# Patient Record
Sex: MALE | Race: WHITE | NOT HISPANIC OR LATINO | Employment: FULL TIME | URBAN - METROPOLITAN AREA
[De-identification: names, ages, dates, MRNs, and addresses within clinical notes are randomized per-mention and may not be internally consistent; named-entity substitution may affect disease eponyms.]

---

## 2017-10-23 ENCOUNTER — OFFICE VISIT (OUTPATIENT)
Dept: URGENT CARE | Facility: CLINIC | Age: 22
End: 2017-10-23
Payer: COMMERCIAL

## 2017-10-23 DIAGNOSIS — R68.84 JAW PAIN: ICD-10-CM

## 2017-10-23 PROCEDURE — G0382 LEV 3 HOSP TYPE B ED VISIT: HCPCS
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
## 2018-02-14 ENCOUNTER — HOSPITAL ENCOUNTER (EMERGENCY)
Facility: HOSPITAL | Age: 23
Discharge: HOME | End: 2018-02-14
Admitting: EMERGENCY MEDICINE
Payer: COMMERCIAL

## 2018-02-14 VITALS
HEART RATE: 85 BPM | RESPIRATION RATE: 16 BRPM | SYSTOLIC BLOOD PRESSURE: 135 MMHG | TEMPERATURE: 97.9 F | WEIGHT: 155.2 LBS | DIASTOLIC BLOOD PRESSURE: 81 MMHG | OXYGEN SATURATION: 100 %

## 2018-02-14 DIAGNOSIS — S83.91XA RIGHT KNEE SPRAIN: Primary | ICD-10-CM

## 2018-02-14 PROCEDURE — 99283 EMERGENCY DEPT VISIT LOW MDM: CPT

## 2018-02-14 RX ORDER — CLONIDINE HYDROCHLORIDE 0.1 MG/1
0.1 TABLET ORAL 3 TIMES DAILY
COMMUNITY
End: 2018-11-16 | Stop reason: SDUPTHER

## 2018-02-14 RX ORDER — KETOROLAC TROMETHAMINE 10 MG/1
10 TABLET, FILM COATED ORAL EVERY 6 HOURS PRN
Qty: 20 TABLET | Refills: 0 | Status: SHIPPED | OUTPATIENT
Start: 2018-02-14 | End: 2018-03-20 | Stop reason: SDUPTHER

## 2018-02-14 RX ORDER — QUETIAPINE FUMARATE 100 MG/1
TABLET, FILM COATED ORAL
COMMUNITY
End: 2018-03-20 | Stop reason: SDUPTHER

## 2018-02-14 RX ORDER — IBUPROFEN 600 MG/1
TABLET ORAL EVERY 6 HOURS PRN
COMMUNITY
End: 2018-03-20 | Stop reason: SDUPTHER

## 2018-02-14 RX ORDER — BUPRENORPHINE HYDROCHLORIDE AND NALOXONE HYDROCHLORIDE DIHYDRATE 8; 2 MG/1; MG/1
TABLET SUBLINGUAL
COMMUNITY
End: 2018-03-20 | Stop reason: SDUPTHER

## 2018-02-14 NOTE — ED PROVIDER NOTES
History  Chief Complaint   Patient presents with    Knee Swelling     seen at [facility] after twisting right knee  still has pain and swelling since     Patient presents to the emergency department today offering a chief complaint of right knee pain  Last week the patient was stepping over a snow bank when he twisted his right knee  Was seen at [facility], had an x-ray which did not show any acute osseous abnormalities, was given a knee brace and crutches  He is awaiting his orthopedic follow-up  Patient presents here because he he thought he could not obtain an MRI in the emergency department  He denies any increasing pain  Denies any increased swelling  Denies any deficits distal to the injury  Patient is prescribed naproxen at home which he is taking  Patient requesting oral Toradol            Prior to Admission Medications   Prescriptions Last Dose Informant Patient Reported? Taking? QUEtiapine (SEROQUEL) 100 mg tablet   Yes Yes   Sig: Take by mouth   buprenorphine-naloxone (SUBOXONE) 8-2 mg per SL tablet   Yes Yes   Sig: Place under the tongue   cloNIDine (CATAPRES) 0 1 mg tablet   Yes Yes   Sig: Take by mouth   ibuprofen (MOTRIN) 600 mg tablet   Yes Yes   Sig: Take by mouth every 6 (six) hours as needed for mild pain      Facility-Administered Medications: None       Past Medical History:   Diagnosis Date    Manic behavior ([address] )        Past Surgical History:   Procedure Laterality Date    MANDIBLE FRACTURE SURGERY         History reviewed  No pertinent family history  I have reviewed and agree with the history as documented  Social History   Substance Use Topics    Smoking status: Current Every Day Smoker    Smokeless tobacco: Never Used    Alcohol use No        Review of Systems   Constitutional: Negative  HENT: Negative  Eyes: Negative  Respiratory: Negative  Cardiovascular: Negative  Gastrointestinal: Negative  Endocrine: Negative  Genitourinary: Negative  Musculoskeletal: Negative for back pain, neck pain and neck stiffness  Right knee pain   Skin: Negative  Allergic/Immunologic: Negative  Neurological: Negative  Hematological: Negative  Psychiatric/Behavioral: Negative  All other systems reviewed and are negative  Physical Exam  ED Triage Vitals [02/14/18 1446]   Temperature Pulse Respirations Blood Pressure SpO2   97 9 °F (36 6 °C) 102 18 135/81 100 %      Temp Source Heart Rate Source Patient Position - Orthostatic VS BP Location FiO2 (%)   Temporal Left Sitting Left arm --      Pain Score       7           Orthostatic Vital Signs  Vitals:    02/14/18 1446   BP: 135/81   Pulse: 102   Patient Position - Orthostatic VS: Sitting       Physical Exam   Constitutional: He is oriented to person, place, and time  He appears well-developed and well-nourished  No distress  HENT:   Head: Normocephalic and atraumatic  Eyes: Pupils are equal, round, and reactive to light  Cardiovascular: Normal rate  Pulmonary/Chest: Effort normal    Musculoskeletal: He exhibits tenderness  He exhibits no deformity  No evidence of right knee edema  Patient has normal dorsalis pedis pulse distally  Normal range of motion and sensation distally  Neurological: He is alert and oriented to person, place, and time  Skin: Skin is warm  Capillary refill takes less than 2 seconds  He is not diaphoretic  Psychiatric: He has a normal mood and affect  Vitals reviewed        ED Medications  Medications - No data to display    Diagnostic Studies  Results Reviewed     None                 No orders to display              Procedures  Procedures       Phone Contacts  ED Phone Contact    ED Course  ED Course                                MDM  CritCare Time    Disposition  Final diagnoses:   Right knee sprain     Time reflects when diagnosis was documented in both MDM as applicable and the Disposition within this note     Time User Action Codes Description Comment    2/14/2018  3:01 PM Rama Herron Tan Darling Right knee sprain       ED Disposition     ED Disposition Condition Comment    Discharge  Mallie Flatness discharge to home/self care  Condition at discharge: Good        Follow-up Information     Follow up With Specialties Details Why Contact Info    Orthopedics  Go to  Keep appt  Patient's Medications   Discharge Prescriptions    KETOROLAC (TORADOL) 10 MG TABLET    Take 1 tablet (10 mg total) by mouth every 6 (six) hours as needed for moderate pain       Start Date: 2/14/2018 End Date: --       Order Dose: 10 mg       Quantity: 20 tablet    Refills: 0     No discharge procedures on file      ED Provider  Electronically Signed by           Deepali Syed PA-C  02/14/18 0383

## 2018-02-14 NOTE — DISCHARGE INSTRUCTIONS
Knee Sprain   WHAT YOU NEED TO KNOW:   A knee sprain occurs when one or more ligaments in your knee are suddenly stretched or torn  Ligaments are tissues that hold bones together  Ligaments support the knee and keep the joint and bones in the correct position  DISCHARGE INSTRUCTIONS:   Seek care immediately if:   · Any part of your leg feels cold, numb, or looks pale     Contact your healthcare provider if:   · You have new or increased swelling, bruising, or pain in your knee  · Your symptoms do not improve within 6 weeks, even with treatment  · You have questions or concerns about your condition or care  Medicines:   · NSAIDs , such as ibuprofen, help decrease swelling, pain, and fever  This medicine is available with or without a doctor's order  NSAIDs can cause stomach bleeding or kidney problems in certain people  If you take blood thinner medicine, always ask your healthcare provider if NSAIDs are safe for you  Always read the medicine label and follow directions  · Acetaminophen  decreases pain and fever  It is available without a doctor's order  Ask how much to take and how often to take it  Follow directions  Read the labels of all other medicines you are using to see if they also contain acetaminophen, or ask your doctor or pharmacist  Acetaminophen can cause liver damage if not taken correctly  Do not use more than 4 grams (4,000 milligrams) total of acetaminophen in one day  · Prescription pain medicine  may be given  Ask how to take this medicine safely  · Take your medicine as directed  Contact your healthcare provider if you think your medicine is not helping or if you have side effects  Tell him or her if you are allergic to any medicine  Keep a list of the medicines, vitamins, and herbs you take  Include the amounts, and when and why you take them  Bring the list or the pill bottles to follow-up visits  Carry your medicine list with you in case of an emergency    Self-care: · Rest  your knee and do not exercise  You may be told to keep weight off your knee  This means that you should not walk on your injured leg  Rest helps decrease swelling and allows the injury to heal  You can do gentle range of motion (ROM) exercises as directed  This will prevent stiffness  · Apply ice  on your knee for 15 to 20 minutes every hour or as directed  Use an ice pack, or put crushed ice in a plastic bag  Cover it with a towel  Ice helps prevent tissue damage and decreases swelling and pain  · Apply compression to your knee as directed  You may need to wear an elastic bandage  This helps keep your injured knee from moving too much while it heals  You can loosen or tighten the elastic bandage to make it comfortable  It should be tight enough for you to feel support  It should not be so tight that it causes your toes to feel numb or tingly  If you are wearing an elastic bandage, take it off and rewrap it once a day  · Elevate your knee  above the level of your heart as often as you can  This will help decrease swelling and pain  Prop your leg on pillows or blankets to keep it elevated comfortably  Do not put pillows directly behind your knee  · Use support devices as directed:  Support devices such as a splint or brace may be needed  These devices limit movement and protect your joint while it heals  You may be given crutches to use until you can stand on your injured leg without pain  Use devices as directed  Physical therapy:  A physical therapist teaches you exercises to help improve movement and strength, and to decrease pain  Prevent another knee sprain:  Exercise your legs to keep your muscles strong  Strong leg muscles help protect your knee and prevent strain  The following may also prevent a knee sprain:  · Slowly start your exercise or training program   Slowly increase the time, distance, and intensity of your exercise   Sudden increases in training may cause you to injure your knee again  · Wear protective braces and equipment as directed  Braces may prevent your knee from moving the wrong way and causing another sprain  Protective equipment may support your bones and ligaments to prevent injury  · Warm up and stretch before exercise  Warm up by walking or using an exercise bike before starting your regular exercise  Do gentle stretches after warming up  This helps to loosen your muscles and decrease stress on your knee  Cool down and stretch after you exercise  · Wear shoes that fit correctly and support your feet  Replace your running or exercise shoes before the padding or shock absorption is worn out  Ask your healthcare provider which exercise shoes are best for you  Ask if you should wear special shoe inserts  Shoe inserts can help support your heels and arches or keep your foot lined up correctly in your shoes  Exercise on flat surfaces  Follow up with your healthcare provider as directed:  Write down your questions so you remember to ask them during your visits  © 2017 [address] [employer] Information is for End User's use only and may not be sold, redistributed or otherwise used for commercial purposes  All illustrations and images included in CareNotes® are the copyrighted property of [employer]  or Justin Manjarrez  The above information is an  only  It is not intended as medical advice for individual conditions or treatments  Talk to your doctor, nurse or pharmacist before following any medical regimen to see if it is safe and effective for you  Knee Sprain   WHAT YOU NEED TO KNOW:   A knee sprain occurs when one or more ligaments in your knee are suddenly stretched or torn  Ligaments are tissues that hold bones together  Ligaments support the knee and keep the joint and bones in the correct position          DISCHARGE INSTRUCTIONS:   Return to the emergency department if:   · Any part of your leg feels cold, numb, or looks pale     Contact your healthcare provider if:   · You have new or increased swelling, bruising, or pain in your knee  · Your symptoms do not improve within 6 weeks, even with treatment  · You have questions or concerns about your condition or care  Medicines:   · NSAIDs , such as ibuprofen, help decrease swelling, pain, and fever  This medicine is available with or without a doctor's order  NSAIDs can cause stomach bleeding or kidney problems in certain people  If you take blood thinner medicine, always ask your healthcare provider if NSAIDs are safe for you  Always read the medicine label and follow directions  · Acetaminophen  decreases pain and fever  It is available without a doctor's order  Ask how much to take and how often to take it  Follow directions  Read the labels of all other medicines you are using to see if they also contain acetaminophen, or ask your doctor or pharmacist  Acetaminophen can cause liver damage if not taken correctly  Do not use more than 4 grams (4,000 milligrams) total of acetaminophen in one day  · Prescription pain medicine  may be given  Ask how to take this medicine safely  · Take your medicine as directed  Contact your healthcare provider if you think your medicine is not helping or if you have side effects  Tell him or her if you are allergic to any medicine  Keep a list of the medicines, vitamins, and herbs you take  Include the amounts, and when and why you take them  Bring the list or the pill bottles to follow-up visits  Carry your medicine list with you in case of an emergency  Self-care:   · Rest  your knee and do not exercise  You may be told to keep weight off your knee  This means that you should not walk on your injured leg  Rest helps decrease swelling and allows the injury to heal  You can do gentle range of motion (ROM) exercises as directed  This will prevent stiffness       · Apply ice  on your knee for 15 to 20 minutes every hour or as directed  Use an ice pack, or put crushed ice in a plastic bag  Cover it with a towel  Ice helps prevent tissue damage and decreases swelling and pain  · Apply compression to your knee as directed  You may need to wear an elastic bandage  This helps keep your injured knee from moving too much while it heals  You can loosen or tighten the elastic bandage to make it comfortable  It should be tight enough for you to feel support  It should not be so tight that it causes your toes to feel numb or tingly  If you are wearing an elastic bandage, take it off and rewrap it once a day  · Elevate your knee  above the level of your heart as often as you can  This will help decrease swelling and pain  Prop your leg on pillows or blankets to keep it elevated comfortably  Do not put pillows directly behind your knee  · Use support devices as directed:  Support devices such as a splint or brace may be needed  These devices limit movement and protect your joint while it heals  You may be given crutches to use until you can stand on your injured leg without pain  Use devices as directed  Physical therapy:  A physical therapist teaches you exercises to help improve movement and strength, and to decrease pain  Prevent another knee sprain:  Exercise your legs to keep your muscles strong  Strong leg muscles help protect your knee and prevent strain  The following may also prevent a knee sprain:  · Slowly start your exercise or training program   Slowly increase the time, distance, and intensity of your exercise  Sudden increases in training may cause you to injure your knee again  · Wear protective braces and equipment as directed  Braces may prevent your knee from moving the wrong way and causing another sprain  Protective equipment may support your bones and ligaments to prevent injury  · Warm up and stretch before exercise  Warm up by walking or using an exercise bike before starting your regular exercise   Do gentle stretches after warming up  This helps to loosen your muscles and decrease stress on your knee  Cool down and stretch after you exercise  · Wear shoes that fit correctly and support your feet  Replace your running or exercise shoes before the padding or shock absorption is worn out  Ask your healthcare provider which exercise shoes are best for you  Ask if you should wear special shoe inserts  Shoe inserts can help support your heels and arches or keep your foot lined up correctly in your shoes  Exercise on flat surfaces  Follow up with your healthcare provider as directed:  Write down your questions so you remember to ask them during your visits  © 2017 [employer] [employer] Information is for End User's use only and may not be sold, redistributed or otherwise used for commercial purposes  All illustrations and images included in CareNotes® are the copyrighted property of [employer]  or [address]  The above information is an  only  It is not intended as medical advice for individual conditions or treatments  Talk to your doctor, nurse or pharmacist before following any medical regimen to see if it is safe and effective for you

## 2018-03-20 ENCOUNTER — OFFICE VISIT (OUTPATIENT)
Dept: FAMILY MEDICINE CLINIC | Facility: CLINIC | Age: 23
End: 2018-03-20

## 2018-03-20 VITALS
OXYGEN SATURATION: 96 % | TEMPERATURE: 97 F | HEART RATE: 94 BPM | BODY MASS INDEX: 25.43 KG/M2 | RESPIRATION RATE: 17 BRPM | HEIGHT: 67 IN | SYSTOLIC BLOOD PRESSURE: 144 MMHG | WEIGHT: 162 LBS | DIASTOLIC BLOOD PRESSURE: 76 MMHG

## 2018-03-20 DIAGNOSIS — M25.561 ACUTE PAIN OF RIGHT KNEE: Primary | ICD-10-CM

## 2018-03-20 PROCEDURE — 99213 OFFICE O/P EST LOW 20 MIN: CPT | Performed by: FAMILY MEDICINE

## 2018-03-20 RX ORDER — BUPRENORPHINE HYDROCHLORIDE, NALOXONE HYDROCHLORIDE 2; .5 MG/1; MG/1
FILM, SOLUBLE BUCCAL; SUBLINGUAL
Refills: 0 | COMMUNITY
Start: 2018-03-13 | End: 2018-10-10

## 2018-03-20 RX ORDER — QUETIAPINE FUMARATE 50 MG/1
TABLET, FILM COATED ORAL
Refills: 0 | COMMUNITY
Start: 2018-03-13 | End: 2018-03-20

## 2018-03-20 RX ORDER — OXYCODONE HYDROCHLORIDE AND ACETAMINOPHEN 5; 325 MG/1; MG/1
1 TABLET ORAL EVERY 4 HOURS PRN
Qty: 5 TABLET | Refills: 0 | Status: SHIPPED | OUTPATIENT
Start: 2018-03-20 | End: 2018-03-30

## 2018-03-20 NOTE — PROGRESS NOTES
OFFICE VISIT  Beatriz Godinez 23 y o  male MRN: [id number]      Assessment / Plan:  Diagnoses and all orders for this visit:    Acute pain of right knee  -     MRI knee right  wo contrast; Future  -     Ambulatory referral to Orthopedic Surgery; Future  -     oxyCODONE-acetaminophen (PERCOCET) 5-325 mg per tablet; Take 1 tablet by mouth every 4 (four) hours as needed for moderate pain for up to 10 days Max Daily Amount: 6 tablets          Reason For Visit / Chief Complaint  Chief Complaint   Patient presents with    Leg Pain     He states he slipped on ice and twisted his knee  He states it happened 4 weeks ago  He was told to get an MRI  HPI:  Beatriz Godinez is a 23 y o  male presents to the office today to establish care  Pt was originally seen in [address] [place] [address], was unable to get MRI, no order, no PCP  Patient was seen on March 8th at [facility] for acute right knee pain for injury to same knee  Pt had increased knee swelling on March 8th  Patient was recommended for follow-up MRI  unable to get MRI at [facility], did not accept insurance  Today, he is wearing a immobilizer to right knee, he states feeling a snap when he got out of the car, walking on snow  Remains with pain  He has minimal numbness that comes and goes to mid calf  Pain worse in the am, remains with swelling  Historical Information   Past Medical History:   Diagnosis Date    Manic behavior ([facility] [address] )      Past Surgical History:   Procedure Laterality Date    MANDIBLE FRACTURE SURGERY       Social History   History   Alcohol Use No     History   Drug Use No     History   Smoking Status    Current Every Day Smoker   Smokeless Tobacco    Never Used     No family history on file      Meds/Allergies   Allergies   Allergen Reactions    Topamax [Topiramate]        Meds:    Current Outpatient Prescriptions:     cloNIDine (CATAPRES) 0 1 mg tablet, Take by mouth, Disp: , Rfl:     QUEtiapine (SEROquel) 50 mg tablet, take 1 tablet by mouth twice a day if needed for AGITATION, Disp: , Rfl: 0    SUBOXONE 2-0 5 MG, dissolve 1/2 daily under the tongue, Disp: , Rfl: 0      REVIEW OF SYSTEMS  A comprehensive review of systems was negative except for: Musculoskeletal: positive for  joint pain, joint stiffness and limp      Current Vitals:   Blood Pressure: 144/76 (03/20/18 0752)  Pulse: 94 (03/20/18 0752)  Temperature: (!) 97 °F (36 1 °C) (03/20/18 0752)  Respirations: 17 (03/20/18 0752)  Height: 5' 7" (170 2 cm) (03/20/18 0752)  Weight - Scale: 73 5 kg (162 lb) (03/20/18 0752)  SpO2: 96 % (03/20/18 0752)  [unfilled]    PHYSICAL EXAMS:  General appearance: alert and oriented, in no acute distress  Lungs: clear to auscultation bilaterally  Heart: regular rate and rhythm, S1, S2 normal, no murmur, click, rub or gallop  Extremities: edema right knee tenderness, + swelling  and pain  Neurologic: Grossly normal{YES/NO:20        Follow up at this office in 2 weeks     Counseling / Coordination of Care  Total floor / unit time spent today  minutes  Greater than 50% of total time was spent with the patient and / or family counseling and / or coordination of care

## 2018-04-28 LAB
ALBUMIN SERPL BCP-MCNC: 4.6 G/DL (ref 3.5–5.7)
ALP SERPL-CCNC: 82 IU/L (ref 40–150)
ALT SERPL W P-5'-P-CCNC: 279 IU/L (ref 0–50)
AMPHETAMINES (HISTORICAL): NEGATIVE
ANION GAP SERPL CALCULATED.3IONS-SCNC: 18.4 MM/L
APTT PPP: 26.6 SEC (ref 24.4–37.6)
AST SERPL W P-5'-P-CCNC: 176 U/L (ref 8–27)
BACTERIA UR QL AUTO: ABNORMAL
BARBITURATES (HISTORICAL): NEGATIVE
BASOPHILS # BLD AUTO: 0 X3/UL (ref 0–0.3)
BASOPHILS # BLD AUTO: 0.2 % (ref 0–2)
BENZODIAZEPINES (HISTORICAL): NEGATIVE
BILIRUB SERPL-MCNC: 1.2 MG/DL (ref 0.3–1)
BILIRUB UR QL STRIP: ABNORMAL
BUN SERPL-MCNC: 12 MG/DL (ref 7–25)
CALCIUM SERPL-MCNC: 9.4 MG/DL (ref 8.6–10.5)
CANNABINOIDS (HISTORICAL): NEGATIVE
CHLORIDE SERPL-SCNC: 96 MM/L (ref 98–107)
CLARITY UR: CLEAR
CO2 SERPL-SCNC: 26 MM/L (ref 21–31)
COCAINE (HISTORICAL): NEGATIVE
COLOR UR: ABNORMAL
CREAT SERPL-MCNC: 1.06 MG/DL (ref 0.7–1.3)
DEPRECATED RDW RBC AUTO: 13.9 % (ref 11.5–14.5)
EGFR (HISTORICAL): > 60 GFR
EGFR AFRICAN AMERICAN (HISTORICAL): > 60 GFR
EOSINOPHIL # BLD AUTO: 0 X3/UL (ref 0–0.5)
EOSINOPHIL NFR BLD AUTO: 0.2 % (ref 0–5)
ETHANOL (HISTORICAL): < 10 MG/DL
GLUCOSE (HISTORICAL): 182 MG/DL (ref 65–99)
GLUCOSE UR STRIP-MCNC: NEGATIVE MG/DL
HCT VFR BLD AUTO: 47.4 % (ref 42–52)
HGB BLD-MCNC: 16.5 G/DL (ref 14–18)
HGB UR QL STRIP.AUTO: ABNORMAL
HYALINE CASTS #/AREA URNS LPF: ABNORMAL /LPF
INR PPP: 1.11 (ref 0.9–1.5)
KETONES UR STRIP-MCNC: ABNORMAL MG/DL
LDL CHOLESTEROL DIRECT (HISTORICAL): 88.5 MG/DL
LEUKOCYTE ESTERASE UR QL STRIP: NEGATIVE
LYMPHOCYTES # BLD AUTO: 0.8 X3/UL (ref 1.2–4.2)
LYMPHOCYTES NFR BLD AUTO: 6 % (ref 20.5–51.1)
LYMPHOCYTES NFR BLD AUTO: 6.7 % (ref 20.5–51.1)
MCH RBC QN AUTO: 29.1 PG (ref 26–34)
MCHC RBC AUTO-ENTMCNC: 34.8 G/DL (ref 31–36)
MCV RBC AUTO: 83.5 FL (ref 81–99)
MEDICAL ALCOHOL (HISTORICAL): 0 %
METHADONE (HISTORICAL): NEGATIVE
MONOCYTES # BLD AUTO: 0.6 X3/UL (ref 0–1)
MONOCYTES (HISTORICAL): 5 % (ref 1.7–12)
MONOCYTES NFR BLD AUTO: 5 % (ref 1.7–12)
MUCUS THREADS (HISTORICAL): PRESENT /HPF
NEUTROPHILS # BLD AUTO: 10.8 X3/UL (ref 1.4–6.5)
NEUTROPHILS ABS COUNT (HISTORICAL): 89 % (ref 42.2–75.2)
NEUTS SEG NFR BLD AUTO: 87.9 % (ref 42.2–75.2)
NITRITE UR QL STRIP: NEGATIVE
NON-SQ EPI CELLS URNS QL MICRO: ABNORMAL /HPF
OPIATES (HISTORICAL): POSITIVE
OSMOLALITY, SERUM (HISTORICAL): 278 MOSM (ref 262–291)
OXYCODONE (HISTORICAL): NEGATIVE
PH UR STRIP.AUTO: 5.5 [PH] (ref 4.5–8)
PHENCYCLIDINE URINE (HISTORICAL): NEGATIVE
PLATELET # BLD AUTO: 286 X3/UL (ref 130–400)
PLATELET ESTIMATE (HISTORICAL): NORMAL
PLEASE NOTE (HISTORICAL): NORMAL
PMV BLD AUTO: 8.8 FL (ref 8.6–11.7)
POTASSIUM SERPL-SCNC: 3.4 MM/L (ref 3.5–5.5)
PROPOXYPHENE (HISTORICAL): NEGATIVE
PROT UR STRIP-MCNC: ABNORMAL MG/DL
PROTHROMBIN TIME (HISTORICAL): 12.9 SEC (ref 10.1–12.9)
RBC # BLD AUTO: 5.67 X6/UL (ref 4.3–5.9)
RBC #/AREA URNS AUTO: ABNORMAL /HPF
RBC MORPHOLOGY (HISTORICAL): NORMAL
SODIUM SERPL-SCNC: 137 MM/L (ref 134–143)
SP GR UR STRIP.AUTO: >= 1.03 (ref 1–1.03)
TOTAL PROTEIN (HISTORICAL): 7.4 G/DL (ref 6.4–8.9)
UROBILINOGEN UR QL STRIP.AUTO: 1 EU/DL (ref 0.2–8)
WBC # BLD AUTO: 12.2 X3/UL (ref 4.8–10.8)
WBC #/AREA URNS AUTO: ABNORMAL /HPF

## 2018-04-29 LAB
ALBUMIN SERPL BCP-MCNC: 3.8 G/DL (ref 3.5–5.7)
ALP SERPL-CCNC: 64 IU/L (ref 40–150)
ALT SERPL W P-5'-P-CCNC: 220 IU/L (ref 0–50)
ANION GAP SERPL CALCULATED.3IONS-SCNC: 10 MM/L
AST SERPL W P-5'-P-CCNC: 98 U/L (ref 8–27)
BASOPHILS # BLD AUTO: 0 % (ref 0–2)
BASOPHILS # BLD AUTO: 0 X3/UL (ref 0–0.3)
BILIRUB SERPL-MCNC: 0.7 MG/DL (ref 0.3–1)
BUN SERPL-MCNC: 9 MG/DL (ref 7–25)
CALCIUM SERPL-MCNC: 9 MG/DL (ref 8.6–10.5)
CHLORIDE SERPL-SCNC: 104 MM/L (ref 98–107)
CK SERPL-CCNC: 400 IU/L (ref 30–223)
CK-MB (HISTORICAL): 3.3 NG/ML (ref 0.6–6.3)
CO2 SERPL-SCNC: 28 MM/L (ref 21–31)
CREAT SERPL-MCNC: 0.69 MG/DL (ref 0.7–1.3)
DEPRECATED RDW RBC AUTO: 13.6 % (ref 11.5–14.5)
EGFR (HISTORICAL): > 60 GFR
EGFR AFRICAN AMERICAN (HISTORICAL): > 60 GFR
EOSINOPHIL # BLD AUTO: 0 X3/UL (ref 0–0.5)
EOSINOPHIL NFR BLD AUTO: 0 % (ref 0–5)
GLUCOSE (HISTORICAL): 157 MG/DL (ref 65–99)
HCT VFR BLD AUTO: 39.4 % (ref 42–52)
HGB BLD-MCNC: 13.6 G/DL (ref 14–18)
LYMPHOCYTES # BLD AUTO: 1.2 X3/UL (ref 1.2–4.2)
LYMPHOCYTES NFR BLD AUTO: 9.8 % (ref 20.5–51.1)
MCH RBC QN AUTO: 28.5 PG (ref 26–34)
MCHC RBC AUTO-ENTMCNC: 34.4 G/DL (ref 31–36)
MCV RBC AUTO: 82.8 FL (ref 81–99)
MONOCYTES # BLD AUTO: 0.9 X3/UL (ref 0–1)
MONOCYTES NFR BLD AUTO: 7.5 % (ref 1.7–12)
NEUTROPHILS # BLD AUTO: 10.3 X3/UL (ref 1.4–6.5)
NEUTS SEG NFR BLD AUTO: 82.7 % (ref 42.2–75.2)
OSMOLALITY, SERUM (HISTORICAL): 278 MOSM (ref 262–291)
PLATELET # BLD AUTO: 275 X3/UL (ref 130–400)
PMV BLD AUTO: 9.1 FL (ref 8.6–11.7)
POTASSIUM SERPL-SCNC: 4 MM/L (ref 3.5–5.5)
RBC # BLD AUTO: 4.76 X6/UL (ref 4.3–5.9)
SODIUM SERPL-SCNC: 138 MM/L (ref 134–143)
TOTAL PROTEIN (HISTORICAL): 6.4 G/DL (ref 6.4–8.9)
TROPONIN I SERPL-MCNC: 0.03 NG/ML
WBC # BLD AUTO: 12.5 X3/UL (ref 4.8–10.8)

## 2018-05-01 LAB
HEPATITIS A IGM ANTIBODY (HISTORICAL): NEGATIVE
HEPATITIS B CORE IGM ANTIBODY (HISTORICAL): NEGATIVE
HEPATITIS B SURFACE ANTIGEN (HISTORICAL): NEGATIVE
HEPATITIS C ANTIBODY (HISTORICAL): >11 S/CO (ref 0–0.9)

## 2018-10-10 ENCOUNTER — OFFICE VISIT (OUTPATIENT)
Dept: INTERNAL MEDICINE CLINIC | Facility: CLINIC | Age: 23
End: 2018-10-10
Payer: COMMERCIAL

## 2018-10-10 VITALS
BODY MASS INDEX: 23.23 KG/M2 | SYSTOLIC BLOOD PRESSURE: 120 MMHG | DIASTOLIC BLOOD PRESSURE: 76 MMHG | HEIGHT: 67 IN | TEMPERATURE: 98.9 F | WEIGHT: 148 LBS | HEART RATE: 72 BPM | RESPIRATION RATE: 14 BRPM

## 2018-10-10 DIAGNOSIS — Z79.899 ENCOUNTER FOR MONITORING SUBOXONE MAINTENANCE THERAPY: ICD-10-CM

## 2018-10-10 DIAGNOSIS — Z51.81 ENCOUNTER FOR MONITORING SUBOXONE MAINTENANCE THERAPY: ICD-10-CM

## 2018-10-10 DIAGNOSIS — F19.10 IV DRUG ABUSE (HCC): ICD-10-CM

## 2018-10-10 DIAGNOSIS — F19.20 DRUG DEPENDENCE (HCC): Primary | ICD-10-CM

## 2018-10-10 DIAGNOSIS — F11.10 HEROIN ABUSE (HCC): ICD-10-CM

## 2018-10-10 PROBLEM — F41.1 GAD (GENERALIZED ANXIETY DISORDER): Status: ACTIVE | Noted: 2018-10-10

## 2018-10-10 PROBLEM — Z79.891 ENCOUNTER FOR MONITORING SUBOXONE MAINTENANCE THERAPY: Status: ACTIVE | Noted: 2018-10-10

## 2018-10-10 PROBLEM — G43.009 MIGRAINE WITHOUT AURA AND WITHOUT STATUS MIGRAINOSUS, NOT INTRACTABLE: Status: ACTIVE | Noted: 2018-10-10

## 2018-10-10 PROBLEM — Z72.0 TOBACCO ABUSE: Status: ACTIVE | Noted: 2018-10-10

## 2018-10-10 PROCEDURE — 99204 OFFICE O/P NEW MOD 45 MIN: CPT | Performed by: INTERNAL MEDICINE

## 2018-10-10 RX ORDER — BUPRENORPHINE HYDROCHLORIDE AND NALOXONE HYDROCHLORIDE DIHYDRATE 8; 2 MG/1; MG/1
TABLET SUBLINGUAL
Qty: 11 TABLET | Refills: 0 | Status: SHIPPED | OUTPATIENT
Start: 2018-10-10 | End: 2018-10-19 | Stop reason: SDUPTHER

## 2018-10-10 RX ORDER — ZOLPIDEM TARTRATE 10 MG/1
TABLET ORAL
Refills: 0 | COMMUNITY
Start: 2018-10-09 | End: 2018-11-16 | Stop reason: SDUPTHER

## 2018-10-10 RX ORDER — BUPRENORPHINE HYDROCHLORIDE AND NALOXONE HYDROCHLORIDE DIHYDRATE 8; 2 MG/1; MG/1
TABLET SUBLINGUAL
Qty: 2 TABLET | Refills: 0 | Status: SHIPPED | OUTPATIENT
Start: 2018-10-10 | End: 2018-10-10

## 2018-10-10 NOTE — PATIENT INSTRUCTIONS
Buprenorphine/Naloxone (Into the mouth)   Buprenorphine (bue-pre-NOR-feen), Naloxone (nal-OX-one)  Treats narcotic dependence  Brand Name(s): Bunavail, Suboxone, Zubsolv   There may be other brand names for this medicine  When This Medicine Should Not Be Used: This medicine is not right for everyone  Do not use it if you had an allergic reaction to buprenorphine or naloxone  How to Use This Medicine: Thin Sheet, Tablet  · Take your medicine as directed  Your dose may need to be changed several times to find what works best for you  · You must let the medicine dissolve  Never swallow the film or tablet  Your body may not absorb enough of the medicine if you swallow it  · Your health caregiver should show you how to use the medicine  If you do not understand, ask for help  It is important to use the medicine correctly  · Do not talk while the medicine is in your mouth  · Buccal film: Rinse your mouth with water to moisten it  Place the film against the inside of your cheek  If your doctor told you to use more than 1 film, place the second film inside your other cheek  Do not place more than 2 films inside of 1 cheek at a time  Do not move or touch the film  Do not eat or drink anything until the film is completely dissolved  · Sublingual tablet: Place the tablet under your tongue  If your doctor told you to use more than 1 tablet, place all of the tablets in different places under your tongue at the same time  You can use 2 tablets at a time until you have taken all of the medicine, if that is easier for you  Let the tablets dissolve completely in your mouth  Do not eat or drink anything until the tablets are completely dissolved  · Sublingual film: Drink some water to help moisten your mouth  Place the film under your tongue  If your doctor told you to use more than 1 film, place the second film on the opposite side from the first one  Do not move the film after you place it under your tongue   If you are supposed to use more than 2 films, use them the same way, but do not start until the first 2 films are completely dissolved  · Do not break, crush, chew, or cut the film or tablet  · This medicine should come with a Medication Guide  Ask your pharmacist for a copy if you do not have one  · Missed dose: Take a dose as soon as you remember  If it is almost time for your next dose, wait until then and take a regular dose  Do not take extra medicine to make up for a missed dose  · Store the medicine in a closed container at room temperature, away from heat, moisture, and direct light  Ask your pharmacist about the best way to dispose of medicine you do not use  Drugs and Foods to Avoid:   Ask your doctor or pharmacist before using any other medicine, including over-the-counter medicines, vitamins, and herbal products  · Do not use this medicine if you are using or have used an MAO inhibitor within the past 14 days  · Some medicines can affect how buprenorphine/naloxone works  Tell your doctor if you are using the following:   ¨ Carbamazepine, erythromycin, ketoconazole, mirtazapine, phenobarbital, phenytoin, rifampin, tramadol, or trazodone  ¨ Medicine to treat depression  ¨ Medicine to treat HIV/AIDS (including atazanavir, delavirdine, efavirenz, etravirine, nevirapine, ritonavir)  ¨ Phenothiazine medicine  · Do not drink alcohol while you are using this medicine  · Tell your doctor if you use anything else that makes you sleepy  Some examples are allergy medicine, narcotic pain medicine, and alcohol  Tell your doctor if you are also using butorphanol, nalbuphine, pentazocine, or a muscle relaxer    Warnings While Using This Medicine:   · Tell your doctor if you are pregnant or breastfeeding, or if you have kidney disease, liver disease (including hepatitis), lung or breathing problems, adrenal gland problems, an enlarged prostate, trouble urinating, gallbladder problems, low thyroid levels, stomach problems, or a history of depression, brain tumor, head injury, alcohol or drug abuse  · This medicine may cause the following problems:  ¨ High risk of overdose, which can lead to death  ¨ Respiratory depression (serious breathing problem that can be life-threatening)  ¨ Liver problems  ¨ Serotonin syndrome, when used with certain medicines  · This medicine may make you dizzy or drowsy  Do not drive or do anything that could be dangerous until you know how this medicine affects you  Stand or sit up slowly if you feel lightheaded or dizzy  · Tell any doctor or dentist who treats you that you are using this medicine  · This medicine can be habit-forming  Do not use more than your prescribed dose  Call your doctor if you think your medicine is not working  · Do not stop using this medicine suddenly  Your doctor will need to slowly decrease your dose before you stop it completely  · This medicine could cause infertility  Talk with your doctor before using this medicine if you plan to have children  · Keep all medicine out of the reach of children  Never share your medicine with anyone    Possible Side Effects While Using This Medicine:   Call your doctor right away if you notice any of these side effects:  · Allergic reaction: Itching or hives, swelling in your face or hands, swelling or tingling in your mouth or throat, chest tightness, trouble breathing  · Blue lips, fingernails, or skin  · Dark urine or pale stools, nausea, vomiting, loss of appetite, stomach pain, yellow skin or eyes  · Extreme dizziness or weakness, shallow breathing, sweating, seizures, cold or clammy skin  · Severe confusion, lightheadedness, dizziness, or fainting  · Trouble breathing or slow breathing  If you notice these less serious side effects, talk with your doctor:   · Headache, trouble sleeping  · Constipation or upset stomach  · Shaking, feeling hot or cold, runny nose, watery eyes, diarrhea, vomiting, and muscle aches  If you notice other side effects that you think are caused by this medicine, tell your doctor  Call your doctor for medical advice about side effects  You may report side effects to FDA at 8-182-IDA-5047  © 2017 [address] Information is for End User's use only and may not be sold, redistributed or otherwise used for commercial purposes  The above information is an  only  It is not intended as medical advice for individual conditions or treatments  Talk to your doctor, nurse or pharmacist before following any medical regimen to see if it is safe and effective for you

## 2018-10-10 NOTE — PROGRESS NOTES
Assessment/Plan:    No problem-specific Assessment & Plan notes found for this encounter  Diagnoses and all orders for this visit:    Drug dependence ([facility] [address] )  -     Discontinue: buprenorphine-naloxone (SUBOXONE) 8-2 mg per SL tablet; Return to office with sample to be dispensed  -     Suboxone  -     Toxicology screen, urine  -     buprenorphine-naloxone (SUBOXONE) 8-2 mg per SL tablet; One and half tabs sl q day  Encounter for monitoring Suboxone maintenance therapy  -     Discontinue: buprenorphine-naloxone (SUBOXONE) 8-2 mg per SL tablet; Return to office with sample to be dispensed  -     Suboxone  -     Toxicology screen, urine  -     buprenorphine-naloxone (SUBOXONE) 8-2 mg per SL tablet; One and half tabs sl q day  Heroin abuse ([facility])  -     buprenorphine-naloxone (SUBOXONE) 8-2 mg per SL tablet; One and half tabs sl q day  IV drug abuse ([facility])  -     buprenorphine-naloxone (SUBOXONE) 8-2 mg per SL tablet; One and half tabs sl q day  Other orders  -     zolpidem (AMBIEN) 10 mg tablet; take 1/2 to 1 tablet by mouth at bedtime if needed for insomnia  -     lurasidone (LATUDA) 20 mg tablet; Take 20 mg by mouth daily with breakfast      A/P: Pt RTC with the med and was dosed  After 15 minutes, started to feel better and no side effects  Will d/c to home on 12mg tabs, dose 1/6 and RTC in one week  Continue with counseling and reminded to keep meds safe and out of reach of children  Subjective:      Patient ID: Thierno Rose is a 20 y o  male  WM, former pt of [facility], presents for initial suboxone  Pt fractured his jaw at 11 y/o and became addicted to prescription narcs orally and nasally  At 16, switched to heroin nasally and a year later, was using 4-5 bundles/day IV  Failed detox at [facility] at 19 and went on suboxone, but relapsed several times  Started suboxone with Dr Andrés Mark in 7/17 and relapsed 9/18 after being quickly weaned per the pt   Was on 8mg q day at the time  H/o trouble with the law, but not recently  Currently in NA and counseling  Last used suboxone 24 hours ago and is withdrawing  Has tried THC, spice, cocaine, meth, xctasy, mushrooms as well  The following portions of the patient's history were reviewed and updated as appropriate:   He  has a past medical history of MAURICE (generalized anxiety disorder) (10/10/2018); Heroin abuse ([place] [address] ) (10/10/2018); and Manic behavior ([place] [address] )  He   Patient Active Problem List    Diagnosis Date Noted    Drug dependence ([place] [address] ) 10/10/2018    Encounter for monitoring Suboxone maintenance therapy 10/10/2018    Drug therapy continued 10/10/2018    Heroin abuse ([place] [address] ) 10/10/2018    IV drug abuse ([place] [address] ) 10/10/2018    Migraine without aura and without status migrainosus, not intractable 10/10/2018    MAURICE (generalized anxiety disorder) 10/10/2018    Tobacco abuse 10/10/2018     He  has a past surgical history that includes Mandible fracture surgery  His family history includes Hepatitis in his father; Multiple sclerosis in his mother; Seizures in his mother  He  reports that he has been smoking  He has a 3 00 pack-year smoking history  He has quit using smokeless tobacco  He reports that he uses drugs, including Amphetamines, Cocaine, Hydrocodone, Heroin, Marijuana, and Oxycodone  He reports that he does not drink alcohol  Current Outpatient Prescriptions   Medication Sig Dispense Refill    lurasidone (LATUDA) 20 mg tablet Take 20 mg by mouth daily with breakfast      buprenorphine-naloxone (SUBOXONE) 8-2 mg per SL tablet One and half tabs sl q day  11 tablet 0    cloNIDine (CATAPRES) 0 1 mg tablet Take by mouth      zolpidem (AMBIEN) 10 mg tablet take 1/2 to 1 tablet by mouth at bedtime if needed for insomnia  0     No current facility-administered medications for this visit        Current Outpatient Prescriptions on File Prior to Visit   Medication Sig    cloNIDine (CATAPRES) 0 1 mg tablet Take by mouth    [DISCONTINUED] SUBOXONE 2-0 5 MG dissolve 1/2 daily under the tongue     No current facility-administered medications on file prior to visit  He is allergic to topamax [topiramate]       Review of Systems   Constitutional: Positive for chills and fatigue  Negative for activity change, diaphoresis and fever  HENT: Positive for congestion and postnasal drip  Eyes: Negative for visual disturbance  Respiratory: Negative for cough, chest tightness, shortness of breath and wheezing  Cardiovascular: Negative for chest pain, palpitations and leg swelling  Gastrointestinal: Positive for abdominal pain and diarrhea  Negative for constipation, nausea and vomiting  Genitourinary: Negative for difficulty urinating, dysuria and frequency  Musculoskeletal: Negative for arthralgias, gait problem and myalgias  Neurological: Negative for dizziness, seizures, weakness, light-headedness and headaches  Psychiatric/Behavioral: Positive for dysphoric mood  Negative for confusion, self-injury, sleep disturbance and suicidal ideas  The patient is nervous/anxious  Objective:      /76   Pulse 72   Temp 98 9 °F (37 2 °C) (Tympanic)   Resp 14   Ht 5' 7" (1 702 m)   Wt 67 1 kg (148 lb)   BMI 23 18 kg/m²          Physical Exam   Constitutional: He is oriented to person, place, and time  He appears well-developed and well-nourished  No distress  HENT:   Head: Normocephalic and atraumatic  Mouth/Throat: Oropharynx is clear and moist    Eyes: Pupils are equal, round, and reactive to light  Conjunctivae and EOM are normal    Neck: Neck supple  No JVD present  Cardiovascular: Normal rate, regular rhythm and normal heart sounds  No murmur heard  Pulmonary/Chest: Effort normal and breath sounds normal  No respiratory distress  He has no wheezes  Abdominal: Soft  Bowel sounds are normal  He exhibits no distension  There is no tenderness  Musculoskeletal: He exhibits no edema     Neurological: He is alert and oriented to person, place, and time  No cranial nerve deficit  Coordination normal    Psychiatric: He has a normal mood and affect  His behavior is normal  Judgment and thought content normal    Nursing note and vitals reviewed

## 2018-10-12 ENCOUNTER — APPOINTMENT (EMERGENCY)
Dept: RADIOLOGY | Facility: HOSPITAL | Age: 23
End: 2018-10-12
Payer: COMMERCIAL

## 2018-10-12 ENCOUNTER — HOSPITAL ENCOUNTER (EMERGENCY)
Facility: HOSPITAL | Age: 23
Discharge: HOME | End: 2018-10-12
Attending: EMERGENCY MEDICINE
Payer: COMMERCIAL

## 2018-10-12 VITALS
RESPIRATION RATE: 18 BRPM | OXYGEN SATURATION: 100 % | DIASTOLIC BLOOD PRESSURE: 83 MMHG | SYSTOLIC BLOOD PRESSURE: 121 MMHG | HEIGHT: 67 IN | WEIGHT: 150 LBS | BODY MASS INDEX: 23.54 KG/M2 | HEART RATE: 70 BPM | TEMPERATURE: 99.4 F

## 2018-10-12 DIAGNOSIS — S00.93XA CONTUSION OF HEAD: ICD-10-CM

## 2018-10-12 DIAGNOSIS — S60.212A CONTUSION OF LEFT WRIST, INITIAL ENCOUNTER: ICD-10-CM
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
## 2018-10-12 PROCEDURE — 99283 EMERGENCY DEPT VISIT LOW MDM: CPT

## 2018-10-12 PROCEDURE — 73110 X-RAY EXAM OF WRIST: CPT

## 2018-10-12 RX ORDER — IBUPROFEN 600 MG/1
600 TABLET ORAL ONCE
Status: COMPLETED | OUTPATIENT
Start: 2018-10-12 | End: 2018-10-12

## 2018-10-12 RX ADMIN — IBUPROFEN 600 MG: 600 TABLET ORAL at 02:33

## 2018-10-12 NOTE — DISCHARGE INSTRUCTIONS
Contusion in Adults   WHAT YOU NEED TO KNOW:   A contusion is a bruise that appears on your skin after an injury  A bruise happens when small blood vessels tear but skin does not  When blood vessels tear, blood leaks into nearby tissue, such as soft tissue or muscle  DISCHARGE INSTRUCTIONS:   Return to the emergency department if:   · You have new trouble moving the injured area  · You have tingling or numbness in or near the injured area  · Your hand or foot below the bruise gets cold or turns pale  Contact your healthcare provider if:   · You find a new lump in the injured area  · Your symptoms do not improve with treatment after 4 to 5 days  · You have questions or concerns about your condition or care  Medicines: You may need any of the following:  · NSAIDs  help decrease swelling and pain or fever  This medicine is available with or without a doctor's order  NSAIDs can cause stomach bleeding or kidney problems in certain people  If you take blood thinner medicine, always ask your healthcare provider if NSAIDs are safe for you  Always read the medicine label and follow directions  · Prescription pain medicine  may be given  Do not wait until the pain is severe before you take your medicine  · Take your medicine as directed  Contact your healthcare provider if you think your medicine is not helping or if you have side effects  Tell him of her if you are allergic to any medicine  Keep a list of the medicines, vitamins, and herbs you take  Include the amounts, and when and why you take them  Bring the list or the pill bottles to follow-up visits  Carry your medicine list with you in case of an emergency  Follow up with your healthcare provider as directed: You may need to return within a week to check your injury again  Write down your questions so you remember to ask them during your visits    Help a contusion heal:   · Rest the injured area  or use it less than usual  If you bruised your leg or foot, you may need crutches or a cane to help you walk  This will help you keep weight off your injured body part  · Apply ice  to decrease swelling and pain  Ice may also help prevent tissue damage  Use an ice pack, or put crushed ice in a plastic bag  Cover it with a towel and place it on your bruise for 15 to 20 minutes every hour or as directed  · Use compression  to support the area and decrease swelling  Wrap an elastic bandage around the area over the bruised muscle  Make sure the bandage is not too tight  You should be able to fit 1 finger between the bandage and your skin  · Elevate (raise) your injured body part  above the level of your heart to help decrease pain and swelling  Use pillows, blankets, or rolled towels to elevate the area as often as you can  · Do not drink alcohol  as directed  Alcohol may slow healing  · Do not stretch injured muscles  right after your injury  Ask your healthcare provider when and how you may safely stretch after your injury  Gentle stretches can help increase your flexibility  · Do not massage the area or put heating pads  on the bruise right after your injury  Heat and massage may slow healing  Your healthcare provider may tell you to apply heat after several days  At that time, heat will start to help the injury heal   Prevent another contusion:   · Stretch and warm up before you play sports or exercise  · Wear protective gear when you play sports  Examples are shin guards and padding  · If you begin a new physical activity, start slowly to give your body a chance to adjust   © 2017 [address] [employer] Information is for End User's use only and may not be sold, redistributed or otherwise used for commercial purposes  All illustrations and images included in CareNotes® are the copyrighted property of [employer]  or Justin Manjarrez  The above information is an  only   It is not intended as medical advice for individual conditions or treatments  Talk to your doctor, nurse or pharmacist before following any medical regimen to see if it is safe and effective for you  Wrist Injury   WHAT YOU NEED TO KNOW:   A wrist injury happens when the tissues of your wrist joint are damaged  Your wrist joint is made up of tendons, ligaments, nerves, and bones  Two common types of injuries that can happen to your wrist are sprains and strains  A sprain can happen when the ligaments are stretched or torn  Ligaments are bands of elastic tissue that connect and hold the bones together  A strain happens when a tendon or muscle is overused, stretched, or torn  Tendons attach your hand and arm muscles to the bones of the wrist    DISCHARGE INSTRUCTIONS:   Medicines:   · NSAIDs:  These medicines decrease swelling, pain, and fever  NSAIDs are available without a doctor's order  Ask which medicine is right for you  Ask how much to take and when to take it  Take as directed  NSAIDs can cause stomach bleeding and kidney problems if not taken correctly  · Pain medicine: You may be given a prescription medicine to decrease pain  Do not wait until the pain is severe before you take this medicine  · Take your medicine as directed  Contact your healthcare provider if you think your medicine is not helping or if you have side effects  Tell him of her if you are allergic to any medicine  Keep a list of the medicines, vitamins, and herbs you take  Include the amounts, and when and why you take them  Bring the list or the pill bottles to follow-up visits  Carry your medicine list with you in case of an emergency  Follow up with your healthcare provider as directed:  Write down your questions so you remember to ask them during your visits  Manage your symptoms:   · Wrist supports:  A cast or splint may be put on your fingers, hand, and wrist to support your wrist and prevent further damage  Wear these as directed   Ask for instructions on how to bathe while you are wearing a splint or case  · Rest:  You may need to rest your wrist for at least 48 hours and avoid activities that cause pain  Ask what activities you should avoid and for how long  · Ice:  Ice helps decrease swelling and pain  Ice may also help prevent tissue damage  Use an ice pack or put crushed ice in a plastic bag  Cover it with a towel and place it on your injured wrist for 15 to 20 minutes every hour as directed  · Compression:  Your healthcare provider may suggest you wrap your wrist with an elastic bandage  This will help decrease swelling, support your wrist, and help it heal  Wear your wrist wrap as directed  Ask for instructions on how to wrap your wrist     · Elevation:  When you sit or lie down, keep your wrist at or above the level of your heart  This may help decrease pain and swelling  Physical therapy:  Your healthcare provider may recommend that you go to physical therapy  A physical therapist shows you how to do exercises that can help to strengthen your wrist and improve its range of movement  These exercises may also help decrease your pain  Prevent another wrist injury:   · Do strengthening exercises: Your healthcare provider or physical therapist may suggest that you do exercises to strengthen your hand and arm muscles  Ask when you may return to your regular physical activities or sports  If you start to exercise too soon it may cause you to injure your wrist again  · Protect your wrists:  Wrist guard splints or protective tape can help to support your wrist during exercise and sports  These devices may also keep your wrist from bending too far back  Ask for more information about the type of wrist support that you should use  Contact your healthcare provider if:   · You have a fever  · The bruising, swelling, or pain in your wrist gets worse  · You have questions or concerns about your condition or care    Return to the emergency department if:   · The skin on or near your wrist or hand feels cold, or it turns blue or white  · The skin on or near your wrist or hand is very tight, raised, and swollen  · You have new trouble moving and using your hands, fingers, or wrist     · Your wrist, hands, or fingers become swollen, red, numb, or they tingle  · Your wrist has any open wounds, including from surgery, that are red, swollen, warm, or have pus coming from them  © 2017 [facility] Information is for End User's use only and may not be sold, redistributed or otherwise used for commercial purposes  All illustrations and images included in CareNotes® are the copyrighted property of [employer]  or Justin Manjarrez  The above information is an  only  It is not intended as medical advice for individual conditions or treatments  Talk to your doctor, nurse or pharmacist before following any medical regimen to see if it is safe and effective for you

## 2018-10-12 NOTE — ED PROVIDER NOTES
History  Chief Complaint   Patient presents with    Fall     Pt slipped on wet wooden steps landing on his left wrist and hitting back of head off steps  Pt and witness deny LOC  Pt denies numbness or tingling in extremeties  Pt c/o LT wrist, lower back, neck, dizziness, head pain, and HA  Patient was walking on wet wooden steps slipped fell backwards striking his left wrist hyperextended position on the ulnar surface against the railing and sustained injury to his left wrist also landed on his back but denies any pain in the back and struck the back of his head on 1 of the steps denies any loss of consciousness no anticoagulants no neck pain or injury no numbness or weakness no change in mental status described as mild headache now  History provided by:  Patient  Fall   Mechanism of injury: fall    Injury location:  Head/neck and shoulder/arm  Shoulder/arm injury location:  L wrist  Time since incident:  1 hour  Fall:     Fall occurred:  Walking    Impact surface:  Stairs    Point of impact:  Head, outstretched arms and back  Suspicion of alcohol use: no    Suspicion of drug use: no    Prior to arrival data:     Responsiveness at scene:  Alert    Orientation at scene:  Person, place, situation and time    Loss of consciousness: no      Amnesic to event: no    Associated symptoms: headaches    Associated symptoms: no abdominal pain, no chest pain, no nausea and no vomiting        Prior to Admission Medications   Prescriptions Last Dose Informant Patient Reported? Taking? buprenorphine-naloxone (SUBOXONE) 8-2 mg per SL tablet   No Yes   Sig: One and half tabs sl q day     cloNIDine (CATAPRES) 0 1 mg tablet   Yes Yes   Sig: Take 0 1 mg by mouth 3 (three) times a day     lurasidone (LATUDA) 20 mg tablet   Yes Yes   Sig: Take 20 mg by mouth daily with breakfast   zolpidem (AMBIEN) 10 mg tablet   Yes Yes   Sig: Take 1/2 to 1 tablet by mouth at bedtime if needed for insomnia      Facility-Administered Medications: None       Past Medical History:   Diagnosis Date    Bipolar 2 disorder ([place] [address] )     MAURICE (generalized anxiety disorder) 10/10/2018    Heroin abuse ([place] [address] ) 10/10/2018    Manic behavior ([place] [address] )        Past Surgical History:   Procedure Laterality Date    MANDIBLE FRACTURE SURGERY      WISDOM TOOTH EXTRACTION         Family History   Problem Relation Age of Onset    Seizures Mother     Multiple sclerosis Mother     Hepatitis Father      I have reviewed and agree with the history as documented  Social History   Substance Use Topics    Smoking status: Current Every Day Smoker     Packs/day: 0 50     Years: 6 00    Smokeless tobacco: Current User     Types: Chew    Alcohol use No        Review of Systems   Constitutional: Negative for activity change, appetite change, chills, fatigue and fever  HENT: Negative for congestion, ear pain, rhinorrhea and sore throat  Eyes: Negative for discharge, redness and visual disturbance  Respiratory: Negative for cough, chest tightness, shortness of breath and wheezing  Cardiovascular: Negative for chest pain and palpitations  Gastrointestinal: Negative for abdominal pain, constipation, diarrhea, nausea and vomiting  Endocrine: Negative for polydipsia and polyuria  Genitourinary: Negative for difficulty urinating, dysuria, frequency, hematuria and urgency  Musculoskeletal: Negative for arthralgias and myalgias  Left wrist pain   Skin: Negative for color change, pallor and rash  Neurological: Positive for headaches  Negative for dizziness, weakness, light-headedness and numbness  Hematological: Negative for adenopathy  Does not bruise/bleed easily  All other systems reviewed and are negative  Physical Exam  Physical Exam   Constitutional: He is oriented to person, place, and time  He appears well-developed and well-nourished  HENT:   Head: Normocephalic and atraumatic     Right Ear: External ear normal    Left Ear: External ear normal    Nose: Nose normal    Mouth/Throat: Oropharynx is clear and moist    Eyes: Pupils are equal, round, and reactive to light  Conjunctivae and EOM are normal    Neck: Normal range of motion  Neck supple  Cardiovascular: Normal rate, regular rhythm, normal heart sounds and intact distal pulses  Pulmonary/Chest: Effort normal and breath sounds normal  No respiratory distress  He has no wheezes  He has no rales  He exhibits no tenderness  Abdominal: Soft  Bowel sounds are normal  He exhibits no distension  There is no tenderness  There is no guarding  Musculoskeletal: Normal range of motion  Left wrist: He exhibits tenderness and swelling  He exhibits normal range of motion and no deformity  Neurological: He is alert and oriented to person, place, and time  No cranial nerve deficit or sensory deficit  Skin: Skin is warm and dry  Psychiatric: He has a normal mood and affect  Nursing note and vitals reviewed        Vital Signs  ED Triage Vitals [10/12/18 0152]   Temperature Pulse Respirations Blood Pressure SpO2   99 4 °F (37 4 °C) 70 18 121/83 100 %      Temp Source Heart Rate Source Patient Position - Orthostatic VS BP Location FiO2 (%)   Temporal Monitor -- Right arm --      Pain Score       6           Vitals:    10/12/18 0152   BP: 121/83   Pulse: 70       Visual Acuity  Visual Acuity      Most Recent Value   L Pupil Size (mm)  3   R Pupil Size (mm)  3          ED Medications  Medications - No data to display    Diagnostic Studies  Results Reviewed     None                 XR wrist 3+ views LEFT    (Results Pending)              Procedures  Procedures       Phone Contacts  ED Phone Contact    ED Course                               MDM  Number of Diagnoses or Management Options  Contusion of head:   Contusion of left wrist, initial encounter: new and requires workup  Fall, initial encounter: new and requires workup  Diagnosis management comments:  Patient is stable in the emergency department PECARN recommends no CT imaging of the head at this time advised supportive care and Velcro wrist splint for left wrist contusion and follow up with PCP and Orthopedics as needed for further evaluation and treatment return precautions and anticipatory guidance discussed  Amount and/or Complexity of Data Reviewed  Tests in the radiology section of CPT®: ordered and reviewed  Independent visualization of images, tracings, or specimens: yes    Risk of Complications, Morbidity, and/or Mortality  Presenting problems: low  Management options: low    Patient Progress  Patient progress: stable    CritCare Time    Disposition  Final diagnoses:   Fall, initial encounter   Contusion of left wrist, initial encounter   Contusion of head     Time reflects when diagnosis was documented in both MDM as applicable and the Disposition within this note     Time User Action Codes Description Comment    10/12/2018  2:09 AM Aniket Comes Add [K74  Aleda Dolsandro Fall, initial encounter     10/12/2018  2:09 AM Yoder Comes Add [S60 212A] Contusion of left wrist, initial encounter     10/12/2018  2:09 AM Iman Perales Add [S00 93XA] Contusion of head       ED Disposition     ED Disposition Condition Comment    Discharge  Sabra Ramirez discharge to home/self care  Condition at discharge: Stable        Follow-up Information     Follow up With Specialties Details Why  47-7, DO Internal Medicine Schedule an appointment as soon as possible for a visit in 2 days  Vinicius Alexander [address] Orthopedic Surgery Schedule an appointment as soon as possible for a visit in 1 week As needed [address]  [id number] [facility] [phone]  [address] [facility] [address]            Patient's Medications   Discharge Prescriptions    No medications on file     No discharge procedures on file      ED Provider  Electronically Signed by           Clarissa Pineda DO  10/12/18 57 [facility]

## 2018-10-19 ENCOUNTER — OFFICE VISIT (OUTPATIENT)
Dept: INTERNAL MEDICINE CLINIC | Facility: CLINIC | Age: 23
End: 2018-10-19
Payer: COMMERCIAL

## 2018-10-19 VITALS
RESPIRATION RATE: 14 BRPM | WEIGHT: 149 LBS | BODY MASS INDEX: 23.39 KG/M2 | TEMPERATURE: 98.2 F | HEIGHT: 67 IN | DIASTOLIC BLOOD PRESSURE: 62 MMHG | SYSTOLIC BLOOD PRESSURE: 118 MMHG | HEART RATE: 78 BPM

## 2018-10-19 DIAGNOSIS — Z79.899 DRUG THERAPY CONTINUED: ICD-10-CM

## 2018-10-19 DIAGNOSIS — Z51.81 ENCOUNTER FOR MONITORING SUBOXONE MAINTENANCE THERAPY: ICD-10-CM

## 2018-10-19 DIAGNOSIS — F19.10 IV DRUG ABUSE (HCC): ICD-10-CM

## 2018-10-19 DIAGNOSIS — F11.10 HEROIN ABUSE (HCC): ICD-10-CM

## 2018-10-19 DIAGNOSIS — F19.20 DRUG DEPENDENCE (HCC): Primary | ICD-10-CM

## 2018-10-19 DIAGNOSIS — Z79.899 ENCOUNTER FOR MONITORING SUBOXONE MAINTENANCE THERAPY: ICD-10-CM

## 2018-10-19 PROCEDURE — 99213 OFFICE O/P EST LOW 20 MIN: CPT | Performed by: INTERNAL MEDICINE

## 2018-10-19 PROCEDURE — 80307 DRUG TEST PRSMV CHEM ANLYZR: CPT | Performed by: INTERNAL MEDICINE

## 2018-10-19 RX ORDER — BUPRENORPHINE HYDROCHLORIDE AND NALOXONE HYDROCHLORIDE DIHYDRATE 8; 2 MG/1; MG/1
TABLET SUBLINGUAL
Qty: 45 TABLET | Refills: 0 | Status: SHIPPED | OUTPATIENT
Start: 2018-10-19 | End: 2018-11-20 | Stop reason: SDUPTHER

## 2018-10-19 NOTE — PROGRESS NOTES
Assessment/Plan:    No problem-specific Assessment & Plan notes found for this encounter  Diagnoses and all orders for this visit:    Drug dependence ([facility] )  -     Suboxone  -     Toxicology screen, urine  -     buprenorphine-naloxone (SUBOXONE) 8-2 mg per SL tablet; One and half tabs sl q day  Drug therapy continued  -     Suboxone  -     Toxicology screen, urine    Encounter for monitoring Suboxone maintenance therapy  -     Suboxone  -     Toxicology screen, urine  -     buprenorphine-naloxone (SUBOXONE) 8-2 mg per SL tablet; One and half tabs sl q day  Heroin abuse ([facility] )  -     Suboxone  -     Toxicology screen, urine  -     buprenorphine-naloxone (SUBOXONE) 8-2 mg per SL tablet; One and half tabs sl q day  IV drug abuse ([facility] )  -     Suboxone  -     Toxicology screen, urine  -     buprenorphine-naloxone (SUBOXONE) 8-2 mg per SL tablet; One and half tabs sl q day  A/P: Doing well and will continue with 12mg tabs, dose 1/6 and continue with counseling and NA  Reminded to keep meds safe and out of reach of children  RTC four weeks  Subjective:      Patient ID: Laura Pennington is a 20 y o  male   RTC for f/u suboxone  Doing well and no c/o's  Not using and no withdraw  Attends both counseling and NA daily  UDT obtained and sent to the lab  Tolerating the meds and no side effects  The following portions of the patient's history were reviewed and updated as appropriate:   He  has a past medical history of Bipolar 2 disorder ([facility] ); MAURICE (generalized anxiety disorder) (10/10/2018); Heroin abuse ([facility] ) (10/10/2018); and Manic behavior ([facility] )    He   Patient Active Problem List    Diagnosis Date Noted    Drug dependence ([facility] ) 10/10/2018    Encounter for monitoring Suboxone maintenance therapy 10/10/2018    Drug therapy continued 10/10/2018    [facility] ) 10/10/2018    IV drug abuse ([facility] ) 10/10/2018    Migraine without aura and without status migrainosus, not intractable 10/10/2018    MAURICE (generalized anxiety disorder) 10/10/2018    Tobacco abuse 10/10/2018     He  has a past surgical history that includes Mandible fracture surgery and [place] tooth extraction  His family history includes Hepatitis in his father; Multiple sclerosis in his mother; Seizures in his mother  He  reports that he has been smoking  He has a 3 00 pack-year smoking history  His smokeless tobacco use includes Chew  He reports that he uses drugs, including Amphetamines, Cocaine, Hydrocodone, Heroin, Marijuana, and Oxycodone  He reports that he does not drink alcohol  Current Outpatient Prescriptions   Medication Sig Dispense Refill    buprenorphine-naloxone (SUBOXONE) 8-2 mg per SL tablet One and half tabs sl q day  45 tablet 0    cloNIDine (CATAPRES) 0 1 mg tablet Take 0 1 mg by mouth 3 (three) times a day        lurasidone (LATUDA) 20 mg tablet Take 20 mg by mouth daily with breakfast      zolpidem (AMBIEN) 10 mg tablet Take 1/2 to 1 tablet by mouth at bedtime if needed for insomnia  0     No current facility-administered medications for this visit  Current Outpatient Prescriptions on File Prior to Visit   Medication Sig    cloNIDine (CATAPRES) 0 1 mg tablet Take 0 1 mg by mouth 3 (three) times a day      lurasidone (LATUDA) 20 mg tablet Take 20 mg by mouth daily with breakfast    zolpidem (AMBIEN) 10 mg tablet Take 1/2 to 1 tablet by mouth at bedtime if needed for insomnia    [DISCONTINUED] buprenorphine-naloxone (SUBOXONE) 8-2 mg per SL tablet One and half tabs sl q day  No current facility-administered medications on file prior to visit  He is allergic to topamax [topiramate]       Review of Systems   Constitutional: Negative for activity change, chills, diaphoresis, fatigue and fever  Respiratory: Negative for cough, chest tightness, shortness of breath and wheezing  Cardiovascular: Negative for chest pain, palpitations and leg swelling     Gastrointestinal: Negative for abdominal pain, constipation, diarrhea, nausea and vomiting  Genitourinary: Negative for difficulty urinating, dysuria and frequency  Musculoskeletal: Negative for arthralgias, gait problem and myalgias  Neurological: Negative for dizziness, seizures, weakness, light-headedness and headaches  Psychiatric/Behavioral: Positive for sleep disturbance  Negative for confusion, dysphoric mood, self-injury and suicidal ideas  The patient is nervous/anxious  Objective:      /62   Pulse 78   Temp 98 2 °F (36 8 °C) (Tympanic)   Resp 14   Ht 5' 7" (1 702 m)   Wt 67 6 kg (149 lb)   BMI 23 34 kg/m²          Physical Exam   Constitutional: He is oriented to person, place, and time  He appears well-developed and well-nourished  No distress  HENT:   Head: Normocephalic and atraumatic  Mouth/Throat: Oropharynx is clear and moist    Eyes: Pupils are equal, round, and reactive to light  Conjunctivae and EOM are normal    Cardiovascular: Normal rate, regular rhythm and normal heart sounds  Pulmonary/Chest: Effort normal and breath sounds normal  No respiratory distress  He has no wheezes  Neurological: He is alert and oriented to person, place, and time  Psychiatric: He has a normal mood and affect  His behavior is normal  Judgment and thought content normal    Nursing note and vitals reviewed

## 2018-10-19 NOTE — PATIENT INSTRUCTIONS
Buprenorphine/Naloxone (Into the mouth)   Buprenorphine (bue-pre-NOR-feen), Naloxone (nal-OX-one)  Treats narcotic dependence  Brand Name(s): Bunavail, Suboxone, Zubsolv   There may be other brand names for this medicine  When This Medicine Should Not Be Used: This medicine is not right for everyone  Do not use it if you had an allergic reaction to buprenorphine or naloxone  How to Use This Medicine: Thin Sheet, Tablet  · Take your medicine as directed  Your dose may need to be changed several times to find what works best for you  · You must let the medicine dissolve  Never swallow the film or tablet  Your body may not absorb enough of the medicine if you swallow it  · Your health caregiver should show you how to use the medicine  If you do not understand, ask for help  It is important to use the medicine correctly  · Do not talk while the medicine is in your mouth  · Buccal film: Rinse your mouth with water to moisten it  Place the film against the inside of your cheek  If your doctor told you to use more than 1 film, place the second film inside your other cheek  Do not place more than 2 films inside of 1 cheek at a time  Do not move or touch the film  Do not eat or drink anything until the film is completely dissolved  · Sublingual tablet: Place the tablet under your tongue  If your doctor told you to use more than 1 tablet, place all of the tablets in different places under your tongue at the same time  You can use 2 tablets at a time until you have taken all of the medicine, if that is easier for you  Let the tablets dissolve completely in your mouth  Do not eat or drink anything until the tablets are completely dissolved  · Sublingual film: Drink some water to help moisten your mouth  Place the film under your tongue  If your doctor told you to use more than 1 film, place the second film on the opposite side from the first one  Do not move the film after you place it under your tongue   If you are supposed to use more than 2 films, use them the same way, but do not start until the first 2 films are completely dissolved  · Do not break, crush, chew, or cut the film or tablet  · This medicine should come with a Medication Guide  Ask your pharmacist for a copy if you do not have one  · Missed dose: Take a dose as soon as you remember  If it is almost time for your next dose, wait until then and take a regular dose  Do not take extra medicine to make up for a missed dose  · Store the medicine in a closed container at room temperature, away from heat, moisture, and direct light  Ask your pharmacist about the best way to dispose of medicine you do not use  Drugs and Foods to Avoid:   Ask your doctor or pharmacist before using any other medicine, including over-the-counter medicines, vitamins, and herbal products  · Do not use this medicine if you are using or have used an MAO inhibitor within the past 14 days  · Some medicines can affect how buprenorphine/naloxone works  Tell your doctor if you are using the following:   ¨ Carbamazepine, erythromycin, ketoconazole, mirtazapine, phenobarbital, phenytoin, rifampin, tramadol, or trazodone  ¨ Medicine to treat depression  ¨ Medicine to treat HIV/AIDS (including atazanavir, delavirdine, efavirenz, etravirine, nevirapine, ritonavir)  ¨ Phenothiazine medicine  · Do not drink alcohol while you are using this medicine  · Tell your doctor if you use anything else that makes you sleepy  Some examples are allergy medicine, narcotic pain medicine, and alcohol  Tell your doctor if you are also using butorphanol, nalbuphine, pentazocine, or a muscle relaxer    Warnings While Using This Medicine:   · Tell your doctor if you are pregnant or breastfeeding, or if you have kidney disease, liver disease (including hepatitis), lung or breathing problems, adrenal gland problems, an enlarged prostate, trouble urinating, gallbladder problems, low thyroid levels, stomach problems, or a history of depression, brain tumor, head injury, alcohol or drug abuse  · This medicine may cause the following problems:  ¨ High risk of overdose, which can lead to death  ¨ Respiratory depression (serious breathing problem that can be life-threatening)  ¨ Liver problems  ¨ Serotonin syndrome, when used with certain medicines  · This medicine may make you dizzy or drowsy  Do not drive or do anything that could be dangerous until you know how this medicine affects you  Stand or sit up slowly if you feel lightheaded or dizzy  · Tell any doctor or dentist who treats you that you are using this medicine  · This medicine can be habit-forming  Do not use more than your prescribed dose  Call your doctor if you think your medicine is not working  · Do not stop using this medicine suddenly  Your doctor will need to slowly decrease your dose before you stop it completely  · This medicine could cause infertility  Talk with your doctor before using this medicine if you plan to have children  · Keep all medicine out of the reach of children  Never share your medicine with anyone    Possible Side Effects While Using This Medicine:   Call your doctor right away if you notice any of these side effects:  · Allergic reaction: Itching or hives, swelling in your face or hands, swelling or tingling in your mouth or throat, chest tightness, trouble breathing  · Blue lips, fingernails, or skin  · Dark urine or pale stools, nausea, vomiting, loss of appetite, stomach pain, yellow skin or eyes  · Extreme dizziness or weakness, shallow breathing, sweating, seizures, cold or clammy skin  · Severe confusion, lightheadedness, dizziness, or fainting  · Trouble breathing or slow breathing  If you notice these less serious side effects, talk with your doctor:   · Headache, trouble sleeping  · Constipation or upset stomach  · Shaking, feeling hot or cold, runny nose, watery eyes, diarrhea, vomiting, and muscle aches  If you notice other side effects that you think are caused by this medicine, tell your doctor  Call your doctor for medical advice about side effects  You may report side effects to FDA at 2-754-YKS-5739  © 2017 [address] [employer] Information is for End User's use only and may not be sold, redistributed or otherwise used for commercial purposes  The above information is an  only  It is not intended as medical advice for individual conditions or treatments  Talk to your doctor, nurse or pharmacist before following any medical regimen to see if it is safe and effective for you

## 2018-10-20 LAB
AMPHETAMINES UR QL SCN: NEGATIVE NG/ML
BARBITURATES UR QL SCN: NEGATIVE NG/ML
BENZODIAZ UR QL SCN: NEGATIVE NG/ML
BZE UR QL: NEGATIVE NG/ML
CANNABINOIDS UR QL SCN: NEGATIVE NG/ML
METHADONE UR QL SCN: NEGATIVE NG/ML
OPIATES UR QL: NEGATIVE NG/ML
PCP UR QL: NEGATIVE NG/ML
PROPOXYPH UR QL: NEGATIVE NG/ML

## 2018-10-26 LAB
BUPRENORPHINE UR CFM-MCNC: 104 NG/ML
BUPRENORPHINE UR QL CFM: NORMAL NG/ML
BUPRENORPHINE UR QL CFM: POSITIVE
BUPRENORPHINE+NOR UR QL: POSITIVE
NORBUPRENORPHINE UR CFM-MCNC: 486 NG/ML
NORBUPRENORPHINE UR QL CFM: POSITIVE

## 2018-11-16 DIAGNOSIS — G47.00 INSOMNIA, UNSPECIFIED TYPE: Primary | ICD-10-CM

## 2018-11-16 DIAGNOSIS — I10 ESSENTIAL HYPERTENSION: ICD-10-CM

## 2018-11-16 RX ORDER — CLONIDINE HYDROCHLORIDE 0.1 MG/1
0.1 TABLET ORAL 3 TIMES DAILY
Qty: 30 TABLET | Refills: 0 | Status: SHIPPED | OUTPATIENT
Start: 2018-11-16 | End: 2018-11-20 | Stop reason: SDUPTHER

## 2018-11-16 RX ORDER — ZOLPIDEM TARTRATE 10 MG/1
10 TABLET ORAL
Qty: 30 TABLET | Refills: 0 | Status: SHIPPED | OUTPATIENT
Start: 2018-11-16 | End: 2018-12-19 | Stop reason: SDUPTHER

## 2018-11-20 ENCOUNTER — OFFICE VISIT (OUTPATIENT)
Dept: INTERNAL MEDICINE CLINIC | Facility: CLINIC | Age: 23
End: 2018-11-20
Payer: COMMERCIAL

## 2018-11-20 VITALS
WEIGHT: 153 LBS | BODY MASS INDEX: 24.01 KG/M2 | HEART RATE: 88 BPM | RESPIRATION RATE: 14 BRPM | DIASTOLIC BLOOD PRESSURE: 64 MMHG | TEMPERATURE: 97.8 F | SYSTOLIC BLOOD PRESSURE: 116 MMHG | HEIGHT: 67 IN

## 2018-11-20 DIAGNOSIS — I10 ESSENTIAL HYPERTENSION: ICD-10-CM

## 2018-11-20 DIAGNOSIS — Z51.81 ENCOUNTER FOR MONITORING SUBOXONE MAINTENANCE THERAPY: ICD-10-CM

## 2018-11-20 DIAGNOSIS — F19.20 DRUG DEPENDENCE (HCC): Primary | ICD-10-CM

## 2018-11-20 DIAGNOSIS — F11.10 HEROIN ABUSE (HCC): ICD-10-CM

## 2018-11-20 DIAGNOSIS — F19.10 IV DRUG ABUSE (HCC): ICD-10-CM

## 2018-11-20 DIAGNOSIS — Z79.899 ENCOUNTER FOR MONITORING SUBOXONE MAINTENANCE THERAPY: ICD-10-CM

## 2018-11-20 DIAGNOSIS — Z79.899 DRUG THERAPY CONTINUED: ICD-10-CM

## 2018-11-20 PROCEDURE — 99213 OFFICE O/P EST LOW 20 MIN: CPT | Performed by: INTERNAL MEDICINE

## 2018-11-20 PROCEDURE — 3078F DIAST BP <80 MM HG: CPT | Performed by: INTERNAL MEDICINE

## 2018-11-20 PROCEDURE — 3008F BODY MASS INDEX DOCD: CPT | Performed by: INTERNAL MEDICINE

## 2018-11-20 PROCEDURE — 80307 DRUG TEST PRSMV CHEM ANLYZR: CPT | Performed by: INTERNAL MEDICINE

## 2018-11-20 PROCEDURE — 3074F SYST BP LT 130 MM HG: CPT | Performed by: INTERNAL MEDICINE

## 2018-11-20 RX ORDER — NALOXONE HYDROCHLORIDE 4 MG/.1ML
SPRAY NASAL
Refills: 0 | COMMUNITY
Start: 2018-10-22 | End: 2022-04-18 | Stop reason: ALTCHOICE

## 2018-11-20 RX ORDER — BUPRENORPHINE HYDROCHLORIDE AND NALOXONE HYDROCHLORIDE DIHYDRATE 8; 2 MG/1; MG/1
TABLET SUBLINGUAL
Qty: 45 TABLET | Refills: 0 | Status: SHIPPED | OUTPATIENT
Start: 2018-11-20 | End: 2018-12-19 | Stop reason: SDUPTHER

## 2018-11-20 RX ORDER — CLONIDINE HYDROCHLORIDE 0.1 MG/1
0.1 TABLET ORAL 3 TIMES DAILY
Qty: 30 TABLET | Refills: 0 | Status: SHIPPED | OUTPATIENT
Start: 2018-11-20 | End: 2018-12-19 | Stop reason: SDUPTHER

## 2018-11-20 NOTE — PROGRESS NOTES
Assessment/Plan:    No problem-specific Assessment & Plan notes found for this encounter  Diagnoses and all orders for this visit:    Drug dependence ([facility] )  -     buprenorphine-naloxone (SUBOXONE) 8-2 mg per SL tablet; One and half tabs sl q day  -     Suboxone  -     Toxicology screen, urine    Drug therapy continued  -     Suboxone  -     Toxicology screen, urine    Encounter for monitoring Suboxone maintenance therapy  -     buprenorphine-naloxone (SUBOXONE) 8-2 mg per SL tablet; One and half tabs sl q day  -     Suboxone  -     Toxicology screen, urine    IV drug abuse (HCC)  -     buprenorphine-naloxone (SUBOXONE) 8-2 mg per SL tablet; One and half tabs sl q day  -     Suboxone  -     Toxicology screen, urine    Heroin abuse (HCC)  -     buprenorphine-naloxone (SUBOXONE) 8-2 mg per SL tablet; One and half tabs sl q day  -     Suboxone  -     Toxicology screen, urine    Essential hypertension  -     cloNIDine (CATAPRES) 0 1 mg tablet; Take 1 tablet (0 1 mg total) by mouth 3 (three) times a day    Other orders  -     NARCAN 4 MG/0 1ML LIQD; instill 1 spray in 1 NOSTRIL if needed for opioid overdose may re     (REFER TO PRESCRIPTION NOTES)  A/P: Doing well and will continue with 12mg tabs, dose 2/6 and continue with NA mtg  Reminded to keep meds safe and out of reach of children  RTC one month and discuss the continued use of clonidine and if still neccessary  Subjective:      Patient ID: Inessa Navarro is a 20 y o  male  WM RTC for f/u suboxone  Doing well and no c/o's  Not using and no withdraw  Attending NA, but finished counseling  UDT obtained and sent to the lab  Tolerating the meds and no side effects  The following portions of the patient's history were reviewed and updated as appropriate:   He  has a past medical history of Bipolar 2 disorder ([facility] ); MAURICE (generalized anxiety disorder) (10/10/2018); Heroin abuse ([facility] ) (10/10/2018); and Manic behavior ([facility] )    He Patient Active Problem List    Diagnosis Date Noted    Drug dependence ([facility] ) 10/10/2018    Encounter for monitoring Suboxone maintenance therapy 10/10/2018    Drug therapy continued 10/10/2018    Heroin abuse ([facility] ) 10/10/2018    IV drug abuse ([place] [address] ) 10/10/2018    Migraine without aura and without status migrainosus, not intractable 10/10/2018    MAURICE (generalized anxiety disorder) 10/10/2018    Tobacco abuse 10/10/2018     He  has a past surgical history that includes Mandible fracture surgery and [place] tooth extraction  His family history includes Hepatitis in his father; Multiple sclerosis in his mother; Seizures in his mother  He  reports that he has been smoking  He has a 3 00 pack-year smoking history  His smokeless tobacco use includes Chew  He reports that he uses drugs, including Amphetamines, Cocaine, Hydrocodone, Heroin, Marijuana, and Oxycodone  He reports that he does not drink alcohol  Current Outpatient Prescriptions   Medication Sig Dispense Refill    buprenorphine-naloxone (SUBOXONE) 8-2 mg per SL tablet One and half tabs sl q day  45 tablet 0    cloNIDine (CATAPRES) 0 1 mg tablet Take 1 tablet (0 1 mg total) by mouth 3 (three) times a day 30 tablet 0    lurasidone (LATUDA) 20 mg tablet Take 20 mg by mouth daily with breakfast      NARCAN 4 MG/0 1ML LIQD instill 1 spray in 1 NOSTRIL if needed for opioid overdose may re     (REFER TO PRESCRIPTION NOTES)  0    zolpidem (AMBIEN) 10 mg tablet Take 1 tablet (10 mg total) by mouth daily at bedtime as needed for sleep 30 tablet 0     No current facility-administered medications for this visit        Current Outpatient Prescriptions on File Prior to Visit   Medication Sig    lurasidone (LATUDA) 20 mg tablet Take 20 mg by mouth daily with breakfast    zolpidem (AMBIEN) 10 mg tablet Take 1 tablet (10 mg total) by mouth daily at bedtime as needed for sleep    [DISCONTINUED] buprenorphine-naloxone (SUBOXONE) 8-2 mg per SL tablet One and half tabs sl q day   [DISCONTINUED] cloNIDine (CATAPRES) 0 1 mg tablet Take 1 tablet (0 1 mg total) by mouth 3 (three) times a day     No current facility-administered medications on file prior to visit  He is allergic to topamax [topiramate]       Review of Systems   Constitutional: Negative for activity change, chills, diaphoresis, fatigue and fever  Respiratory: Negative for cough, chest tightness, shortness of breath and wheezing  Cardiovascular: Negative for chest pain, palpitations and leg swelling  Gastrointestinal: Negative for abdominal pain, constipation, diarrhea, nausea and vomiting  Genitourinary: Negative for difficulty urinating, dysuria and frequency  Musculoskeletal: Negative for arthralgias, gait problem and myalgias  Neurological: Negative for dizziness, seizures, weakness, light-headedness and headaches  Psychiatric/Behavioral: Negative for confusion, dysphoric mood, self-injury, sleep disturbance and suicidal ideas  The patient is not nervous/anxious  Objective:      /64   Pulse 88   Temp 97 8 °F (36 6 °C) (Tympanic)   Resp 14   Ht 5' 7" (1 702 m)   Wt 69 4 kg (153 lb)   BMI 23 96 kg/m²          Physical Exam   Constitutional: He is oriented to person, place, and time  He appears well-developed and well-nourished  No distress  HENT:   Head: Normocephalic and atraumatic  Mouth/Throat: Oropharynx is clear and moist    Eyes: Pupils are equal, round, and reactive to light  Conjunctivae and EOM are normal    Cardiovascular: Normal rate, regular rhythm and normal heart sounds  Pulmonary/Chest: Effort normal and breath sounds normal  No respiratory distress  He has no wheezes  Abdominal: Soft  Bowel sounds are normal  He exhibits no distension  There is no tenderness  Neurological: He is alert and oriented to person, place, and time  Psychiatric: He has a normal mood and affect   His behavior is normal  Judgment and thought content normal    Nursing note and vitals reviewed

## 2018-11-20 NOTE — PATIENT INSTRUCTIONS
Buprenorphine/Naloxone (Into the mouth)   Buprenorphine (bue-pre-NOR-feen), Naloxone (nal-OX-one)  Treats narcotic dependence  Brand Name(s): Bunavail, Suboxone, Zubsolv   There may be other brand names for this medicine  When This Medicine Should Not Be Used: This medicine is not right for everyone  Do not use it if you had an allergic reaction to buprenorphine or naloxone  How to Use This Medicine: Thin Sheet, Tablet  · Take your medicine as directed  Your dose may need to be changed several times to find what works best for you  · You must let the medicine dissolve  Never swallow the film or tablet  Your body may not absorb enough of the medicine if you swallow it  · Your health caregiver should show you how to use the medicine  If you do not understand, ask for help  It is important to use the medicine correctly  · Do not talk while the medicine is in your mouth  · Buccal film: Rinse your mouth with water to moisten it  Place the film against the inside of your cheek  If your doctor told you to use more than 1 film, place the second film inside your other cheek  Do not place more than 2 films inside of 1 cheek at a time  Do not move or touch the film  Do not eat or drink anything until the film is completely dissolved  · Sublingual tablet: Place the tablet under your tongue  If your doctor told you to use more than 1 tablet, place all of the tablets in different places under your tongue at the same time  You can use 2 tablets at a time until you have taken all of the medicine, if that is easier for you  Let the tablets dissolve completely in your mouth  Do not eat or drink anything until the tablets are completely dissolved  · Sublingual film: Drink some water to help moisten your mouth  Place the film under your tongue  If your doctor told you to use more than 1 film, place the second film on the opposite side from the first one  Do not move the film after you place it under your tongue   If you are supposed to use more than 2 films, use them the same way, but do not start until the first 2 films are completely dissolved  · Do not break, crush, chew, or cut the film or tablet  · This medicine should come with a Medication Guide  Ask your pharmacist for a copy if you do not have one  · Missed dose: Take a dose as soon as you remember  If it is almost time for your next dose, wait until then and take a regular dose  Do not take extra medicine to make up for a missed dose  · Store the medicine in a closed container at room temperature, away from heat, moisture, and direct light  Ask your pharmacist about the best way to dispose of medicine you do not use  Drugs and Foods to Avoid:   Ask your doctor or pharmacist before using any other medicine, including over-the-counter medicines, vitamins, and herbal products  · Do not use this medicine if you are using or have used an MAO inhibitor within the past 14 days  · Some medicines can affect how buprenorphine/naloxone works  Tell your doctor if you are using the following:   ¨ Carbamazepine, erythromycin, ketoconazole, mirtazapine, phenobarbital, phenytoin, rifampin, tramadol, or trazodone  ¨ Medicine to treat depression  ¨ Medicine to treat HIV/AIDS (including atazanavir, delavirdine, efavirenz, etravirine, nevirapine, ritonavir)  ¨ Phenothiazine medicine  · Do not drink alcohol while you are using this medicine  · Tell your doctor if you use anything else that makes you sleepy  Some examples are allergy medicine, narcotic pain medicine, and alcohol  Tell your doctor if you are also using butorphanol, nalbuphine, pentazocine, or a muscle relaxer    Warnings While Using This Medicine:   · Tell your doctor if you are pregnant or breastfeeding, or if you have kidney disease, liver disease (including hepatitis), lung or breathing problems, adrenal gland problems, an enlarged prostate, trouble urinating, gallbladder problems, low thyroid levels, stomach problems, or a history of depression, brain tumor, head injury, alcohol or drug abuse  · This medicine may cause the following problems:  ¨ High risk of overdose, which can lead to death  ¨ Respiratory depression (serious breathing problem that can be life-threatening)  ¨ Liver problems  ¨ Serotonin syndrome, when used with certain medicines  · This medicine may make you dizzy or drowsy  Do not drive or do anything that could be dangerous until you know how this medicine affects you  Stand or sit up slowly if you feel lightheaded or dizzy  · Tell any doctor or dentist who treats you that you are using this medicine  · This medicine can be habit-forming  Do not use more than your prescribed dose  Call your doctor if you think your medicine is not working  · Do not stop using this medicine suddenly  Your doctor will need to slowly decrease your dose before you stop it completely  · This medicine could cause infertility  Talk with your doctor before using this medicine if you plan to have children  · Keep all medicine out of the reach of children  Never share your medicine with anyone    Possible Side Effects While Using This Medicine:   Call your doctor right away if you notice any of these side effects:  · Allergic reaction: Itching or hives, swelling in your face or hands, swelling or tingling in your mouth or throat, chest tightness, trouble breathing  · Blue lips, fingernails, or skin  · Dark urine or pale stools, nausea, vomiting, loss of appetite, stomach pain, yellow skin or eyes  · Extreme dizziness or weakness, shallow breathing, sweating, seizures, cold or clammy skin  · Severe confusion, lightheadedness, dizziness, or fainting  · Trouble breathing or slow breathing  If you notice these less serious side effects, talk with your doctor:   · Headache, trouble sleeping  · Constipation or upset stomach  · Shaking, feeling hot or cold, runny nose, watery eyes, diarrhea, vomiting, and muscle aches  If you notice other side effects that you think are caused by this medicine, tell your doctor  Call your doctor for medical advice about side effects  You may report side effects to FDA at 5-935-QOY-5356  © 2017 [address] [employer] Information is for End User's use only and may not be sold, redistributed or otherwise used for commercial purposes  The above information is an  only  It is not intended as medical advice for individual conditions or treatments  Talk to your doctor, nurse or pharmacist before following any medical regimen to see if it is safe and effective for you  agitation...

## 2018-11-29 LAB
BUPRENORPHINE UR CFM-MCNC: 338 NG/ML
BUPRENORPHINE UR QL CFM: NORMAL NG/ML
BUPRENORPHINE UR QL CFM: POSITIVE
BUPRENORPHINE+NOR UR QL: POSITIVE
NORBUPRENORPHINE UR CFM-MCNC: 1298 NG/ML
NORBUPRENORPHINE UR QL CFM: POSITIVE

## 2018-12-04 ENCOUNTER — HOSPITAL ENCOUNTER (EMERGENCY)
Facility: HOSPITAL | Age: 23
Discharge: HOME | End: 2018-12-05
Attending: INTERNAL MEDICINE | Admitting: INTERNAL MEDICINE
Payer: COMMERCIAL

## 2018-12-04 DIAGNOSIS — M54.16 LUMBAR RADICULOPATHY: Primary | ICD-10-CM

## 2018-12-04 PROCEDURE — 99283 EMERGENCY DEPT VISIT LOW MDM: CPT

## 2018-12-05 ENCOUNTER — APPOINTMENT (EMERGENCY)
Dept: RADIOLOGY | Facility: HOSPITAL | Age: 23
End: 2018-12-05
Payer: COMMERCIAL

## 2018-12-05 VITALS
OXYGEN SATURATION: 100 % | HEIGHT: 67 IN | RESPIRATION RATE: 20 BRPM | SYSTOLIC BLOOD PRESSURE: 117 MMHG | BODY MASS INDEX: 24.33 KG/M2 | HEART RATE: 78 BPM | WEIGHT: 155 LBS | DIASTOLIC BLOOD PRESSURE: 60 MMHG | TEMPERATURE: 98.1 F

## 2018-12-05 PROCEDURE — 72100 X-RAY EXAM L-S SPINE 2/3 VWS: CPT

## 2018-12-05 PROCEDURE — 96372 THER/PROPH/DIAG INJ SC/IM: CPT

## 2018-12-05 RX ORDER — METHYLPREDNISOLONE 4 MG/1
TABLET ORAL
Qty: 21 TABLET | Refills: 0 | Status: SHIPPED | OUTPATIENT
Start: 2018-12-05 | End: 2018-12-19

## 2018-12-05 RX ORDER — CYCLOBENZAPRINE HCL 10 MG
10 TABLET ORAL 2 TIMES DAILY PRN
Qty: 20 TABLET | Refills: 0 | Status: SHIPPED | OUTPATIENT
Start: 2018-12-05 | End: 2019-02-15

## 2018-12-05 RX ORDER — METHOCARBAMOL 500 MG/1
500 TABLET, FILM COATED ORAL ONCE
Status: COMPLETED | OUTPATIENT
Start: 2018-12-05 | End: 2018-12-05

## 2018-12-05 RX ORDER — DEXAMETHASONE SODIUM PHOSPHATE 4 MG/ML
10 INJECTION, SOLUTION INTRA-ARTICULAR; INTRALESIONAL; INTRAMUSCULAR; INTRAVENOUS; SOFT TISSUE ONCE
Status: COMPLETED | OUTPATIENT
Start: 2018-12-05 | End: 2018-12-05

## 2018-12-05 RX ADMIN — DEXAMETHASONE SODIUM PHOSPHATE 10 MG: 4 INJECTION, SOLUTION INTRA-ARTICULAR; INTRALESIONAL; INTRAMUSCULAR; INTRAVENOUS; SOFT TISSUE at 01:15

## 2018-12-05 RX ADMIN — METHOCARBAMOL TABLETS 500 MG: 500 TABLET, COATED ORAL at 02:11

## 2018-12-05 NOTE — ED PROVIDER NOTES
History  Chief Complaint   Patient presents with    Back Pain     Patient states is having lower back pain, did come into ED for same "last weel" but left because it was busy  States it got a little better, but he moved furniture the other day and it started to hurt again, today he felt a "pop" and now can't move low back at all per pt, so came to ED  49-year-old who 4 days ago was lifting a couch, he was unable straight up all the way, and crouching over felt a sudden onset of pain which radiated down his right leg  It has been unrelieved since that time  He has never had back pain before  He has difficulty standing straight  It has interfered with his sleep  Is a burning sensation down his leg  Prior to Admission Medications   Prescriptions Last Dose Informant Patient Reported? Taking? NARCAN 4 MG/0 1ML LIQD   Yes No   Sig: instill 1 spray in 1 NOSTRIL if needed for opioid overdose may re     (REFER TO PRESCRIPTION NOTES)  buprenorphine-naloxone (SUBOXONE) 8-2 mg per SL tablet   No No   Sig: One and half tabs sl q day  cloNIDine (CATAPRES) 0 1 mg tablet   No No   Sig: Take 1 tablet (0 1 mg total) by mouth 3 (three) times a day   zolpidem (AMBIEN) 10 mg tablet   No No   Sig: Take 1 tablet (10 mg total) by mouth daily at bedtime as needed for sleep      Facility-Administered Medications: None       Past Medical History:   Diagnosis Date    B[facility] disorder [facility] )     MAURICE (generalized anxiety disorder) 10/10/2018    Heroin abuse ([facility] ) 10/10/2018    Manic behavior ([facility] )        Past Surgical History:   Procedure Laterality Date    MANDIBLE FRACTURE SURGERY      WISDOM TOOTH EXTRACTION         Family History   Problem Relation Age of Onset    Seizures Mother     Multiple sclerosis Mother     Hepatitis Father      I have reviewed and agree with the history as documented      Social History   Substance Use Topics    Smoking status: Current Every Day Smoker     Packs/day: 0 50 Years: 6 00    Smokeless tobacco: Current User     Types: Chew    Alcohol use No        Review of Systems   Constitutional: Negative for chills and fever  HENT: Negative for rhinorrhea and sore throat  Eyes: Negative for visual disturbance  Respiratory: Negative for cough and shortness of breath  Cardiovascular: Negative for chest pain and leg swelling  Gastrointestinal: Negative for abdominal pain and vomiting  Genitourinary: Negative for dysuria  Musculoskeletal: Positive for back pain  Negative for myalgias  Skin: Negative for rash  Neurological: Positive for weakness  Feels weak in the right lower extremity   All other systems reviewed and are negative  Physical Exam  Physical Exam   Constitutional: He is oriented to person, place, and time  He appears well-developed and well-nourished  HENT:   Nose: Nose normal    Mouth/Throat: Oropharynx is clear and moist  No oropharyngeal exudate  Eyes: Pupils are equal, round, and reactive to light  Conjunctivae and EOM are normal  No scleral icterus  Neck: Normal range of motion  Neck supple  No JVD present  No tracheal deviation present  Cardiovascular: Normal rate, regular rhythm and normal heart sounds  No murmur heard  Pulmonary/Chest: Effort normal and breath sounds normal  No respiratory distress  He has no wheezes  He has no rales  Abdominal: Soft  Bowel sounds are normal  There is no tenderness  There is no guarding  Musculoskeletal: He exhibits tenderness  He exhibits no edema  Pain discomfort in the low back  Neurological: He is alert and oriented to person, place, and time  No cranial nerve deficit or sensory deficit  He exhibits normal muscle tone  Positive straight leg raising on the right  No evidence of clonus  Sensory perception in the right thigh appears abnormal to him subjectively  Pain on for bending as well with symptoms down the right posterior leg   Skin: Skin is warm and dry     Psychiatric: He has a normal mood and affect  His behavior is normal    Nursing note and vitals reviewed  Vital Signs  ED Triage Vitals [12/04/18 2314]   Temperature Pulse Respirations Blood Pressure SpO2   98 1 °F (36 7 °C) 71 18 129/76 100 %      Temp Source Heart Rate Source Patient Position - Orthostatic VS BP Location FiO2 (%)   Tympanic Monitor Sitting Left arm --      Pain Score       6           Vitals:    12/04/18 2314   BP: 129/76   Pulse: 71   Patient Position - Orthostatic VS: Sitting       Visual Acuity      ED Medications  Medications - No data to display    Diagnostic Studies  Results Reviewed     None                 No orders to display              Procedures  Procedures       Phone Contacts  ED Phone Contact    ED Course                               MDM  CritCare Time    Disposition  Final diagnoses:   None     ED Disposition     None      Follow-up Information    None         Patient's Medications   Discharge Prescriptions    No medications on file     No discharge procedures on file      ED Provider  Electronically Signed by           Kelly Ayoub DO  12/05/18 0611

## 2018-12-05 NOTE — DISCHARGE INSTRUCTIONS
Lumbar Radiculopathy   WHAT YOU NEED TO KNOW:   Lumbar radiculopathy is a painful condition that happens when a nerve in your lumbar spine (lower back) is pinched or irritated  Nerves control feeling and movement in your body  You may have numbness or pain that shoots down from your lower back towards your foot  DISCHARGE INSTRUCTIONS:   Medicines:   · Medicines:     ¨ NSAIDs , such as ibuprofen, help decrease swelling, pain, and fever  This medicine is available with or without a doctor's order  NSAIDs can cause stomach bleeding or kidney problems in certain people  If you take blood thinner medicine, always ask your healthcare provider if NSAIDs are safe for you  Always read the medicine label and follow directions  ¨ Muscle relaxers  help decrease pain and muscle spasms  ¨ Opioids: This is a strong medicine given to reduce severe pain  It is also called narcotic pain medicine  Take this medicine exactly as directed by your healthcare provider  ¨ Oral steroids: Steroids may also be given to reduce pain and swelling  ¨ Take your medicine as directed  Contact your healthcare provider if you think your medicine is not helping or if you have side effects  Tell him of her if you are allergic to any medicine  Keep a list of the medicines, vitamins, and herbs you take  Include the amounts, and when and why you take them  Bring the list or the pill bottles to follow-up visits  Carry your medicine list with you in case of an emergency  Follow up with your healthcare provider or spine specialist within 1 to 3 weeks:  After your first follow-up appointment, return to your healthcare provider or spine specialist every 2 weeks until you have healed  Ask for information about physical therapy for your condition  Write down your questions so you remember to ask them during your visits  Physical therapy:  You may need physical therapy to improve your condition   Your physical therapist may teach you certain exercises to improve posture (the way you stand and sit), flexibility, and strength in your lower back  Self care:   · Stay active: It is best to be active when you have lumbar radiculopathy  Your physical therapist or healthcare provider may tell you to take walks to ease yourself back into your daily routine  Avoid long periods of bed rest  Bed rest could worsen your symptoms  Do not move in ways that increase your pain  Ask for more information about the best ways to stay active  · Use ice or heat packs:  Use ice or heat packs as directed on the sore area of your body to decrease the pain and swelling  Put ice in a plastic bag covered with a towel on your low back  Cover heated items with a towel to avoid burns  Use ice and heat as directed  · Avoid heavy lifting: Your condition may worsen if you lift heavy things  Avoid lifting if possible  · Maintain a healthy weight:  Excess body weight may strain your back  Talk with your healthcare provider about ways to lose excess weight if you are overweight  Contact your healthcare provider or spine specialist if:   · Your pain does not improve within 1 to 3 weeks after treatment  · Your pain and weakness keep you from your normal activities at work, home, or school  · You lose more than 10 pounds in 6 months without trying  · You become depressed or sad because of the pain  · You have questions or concerns about your condition or care  Return to the emergency department if:   · You have a fever greater than 100 4°F for longer than 2 days  · You have new, severe back or leg pain, or your pain spreads to both legs  · You have any new signs of numbness or weakness, especially in your lower back, legs, arms, or genital area  · You have new trouble controlling your urine and bowel movements  · You do not feel like your bladder empties when you urinate    © 2017 [employer] [employer] Information is for End User's use only and may not be sold, redistributed or otherwise used for commercial purposes  All illustrations and images included in CareNotes® are the copyrighted property of [employer]  or Justin Manjarrez  The above information is an  only  It is not intended as medical advice for individual conditions or treatments  Talk to your doctor, nurse or pharmacist before following any medical regimen to see if it is safe and effective for you

## 2018-12-19 ENCOUNTER — OFFICE VISIT (OUTPATIENT)
Dept: INTERNAL MEDICINE CLINIC | Facility: CLINIC | Age: 23
End: 2018-12-19
Payer: COMMERCIAL

## 2018-12-19 VITALS
HEART RATE: 82 BPM | SYSTOLIC BLOOD PRESSURE: 110 MMHG | HEIGHT: 67 IN | DIASTOLIC BLOOD PRESSURE: 70 MMHG | BODY MASS INDEX: 24.64 KG/M2 | TEMPERATURE: 97.8 F | WEIGHT: 157 LBS | RESPIRATION RATE: 14 BRPM

## 2018-12-19 DIAGNOSIS — I10 ESSENTIAL HYPERTENSION: ICD-10-CM

## 2018-12-19 DIAGNOSIS — Z51.81 ENCOUNTER FOR MONITORING SUBOXONE MAINTENANCE THERAPY: ICD-10-CM

## 2018-12-19 DIAGNOSIS — F19.10 IV DRUG ABUSE (HCC): ICD-10-CM

## 2018-12-19 DIAGNOSIS — F19.20 DRUG DEPENDENCE (HCC): Primary | ICD-10-CM

## 2018-12-19 DIAGNOSIS — Z79.899 DRUG THERAPY CONTINUED: ICD-10-CM

## 2018-12-19 DIAGNOSIS — F11.10 HEROIN ABUSE (HCC): ICD-10-CM

## 2018-12-19 DIAGNOSIS — Z79.899 ENCOUNTER FOR MONITORING SUBOXONE MAINTENANCE THERAPY: ICD-10-CM

## 2018-12-19 DIAGNOSIS — G47.00 INSOMNIA, UNSPECIFIED TYPE: ICD-10-CM

## 2018-12-19 PROCEDURE — 3078F DIAST BP <80 MM HG: CPT | Performed by: INTERNAL MEDICINE

## 2018-12-19 PROCEDURE — 80307 DRUG TEST PRSMV CHEM ANLYZR: CPT | Performed by: INTERNAL MEDICINE

## 2018-12-19 PROCEDURE — 99213 OFFICE O/P EST LOW 20 MIN: CPT | Performed by: INTERNAL MEDICINE

## 2018-12-19 PROCEDURE — 3008F BODY MASS INDEX DOCD: CPT | Performed by: INTERNAL MEDICINE

## 2018-12-19 PROCEDURE — 3074F SYST BP LT 130 MM HG: CPT | Performed by: INTERNAL MEDICINE

## 2018-12-19 RX ORDER — ZOLPIDEM TARTRATE 10 MG/1
10 TABLET ORAL
Qty: 30 TABLET | Refills: 0 | Status: SHIPPED | OUTPATIENT
Start: 2018-12-19 | End: 2019-01-16 | Stop reason: SDUPTHER

## 2018-12-19 RX ORDER — BUPRENORPHINE HYDROCHLORIDE AND NALOXONE HYDROCHLORIDE DIHYDRATE 8; 2 MG/1; MG/1
TABLET SUBLINGUAL
Qty: 45 TABLET | Refills: 0 | Status: SHIPPED | OUTPATIENT
Start: 2018-12-19 | End: 2019-01-16 | Stop reason: SDUPTHER

## 2018-12-19 RX ORDER — CLONIDINE HYDROCHLORIDE 0.1 MG/1
0.1 TABLET ORAL 3 TIMES DAILY
Qty: 90 TABLET | Refills: 0 | Status: SHIPPED | OUTPATIENT
Start: 2018-12-19 | End: 2019-01-16 | Stop reason: SDUPTHER

## 2018-12-19 NOTE — PATIENT INSTRUCTIONS
Buprenorphine/Naloxone (Into the mouth)   Buprenorphine (bue-pre-NOR-feen), Naloxone (nal-OX-one)  Treats narcotic dependence  Brand Name(s): Bunavail, Suboxone, Zubsolv   There may be other brand names for this medicine  When This Medicine Should Not Be Used: This medicine is not right for everyone  Do not use it if you had an allergic reaction to buprenorphine or naloxone  How to Use This Medicine: Thin Sheet, Tablet  · Take your medicine as directed  Your dose may need to be changed several times to find what works best for you  · You must let the medicine dissolve  Never swallow the film or tablet  Your body may not absorb enough of the medicine if you swallow it  · Your health caregiver should show you how to use the medicine  If you do not understand, ask for help  It is important to use the medicine correctly  · Do not talk while the medicine is in your mouth  · Buccal film: Rinse your mouth with water to moisten it  Place the film against the inside of your cheek  If your doctor told you to use more than 1 film, place the second film inside your other cheek  Do not place more than 2 films inside of 1 cheek at a time  Do not move or touch the film  Do not eat or drink anything until the film is completely dissolved  · Sublingual tablet: Place the tablet under your tongue  If your doctor told you to use more than 1 tablet, place all of the tablets in different places under your tongue at the same time  You can use 2 tablets at a time until you have taken all of the medicine, if that is easier for you  Let the tablets dissolve completely in your mouth  Do not eat or drink anything until the tablets are completely dissolved  · Sublingual film: Drink some water to help moisten your mouth  Place the film under your tongue  If your doctor told you to use more than 1 film, place the second film on the opposite side from the first one  Do not move the film after you place it under your tongue   If you are supposed to use more than 2 films, use them the same way, but do not start until the first 2 films are completely dissolved  · Do not break, crush, chew, or cut the film or tablet  · This medicine should come with a Medication Guide  Ask your pharmacist for a copy if you do not have one  · Missed dose: Take a dose as soon as you remember  If it is almost time for your next dose, wait until then and take a regular dose  Do not take extra medicine to make up for a missed dose  · Store the medicine in a closed container at room temperature, away from heat, moisture, and direct light  Ask your pharmacist about the best way to dispose of medicine you do not use  Drugs and Foods to Avoid:   Ask your doctor or pharmacist before using any other medicine, including over-the-counter medicines, vitamins, and herbal products  · Do not use this medicine if you are using or have used an MAO inhibitor within the past 14 days  · Some medicines can affect how buprenorphine/naloxone works  Tell your doctor if you are using the following:   ¨ Carbamazepine, erythromycin, ketoconazole, mirtazapine, phenobarbital, phenytoin, rifampin, tramadol, or trazodone  ¨ Medicine to treat depression  ¨ Medicine to treat HIV/AIDS (including atazanavir, delavirdine, efavirenz, etravirine, nevirapine, ritonavir)  ¨ Phenothiazine medicine  · Do not drink alcohol while you are using this medicine  · Tell your doctor if you use anything else that makes you sleepy  Some examples are allergy medicine, narcotic pain medicine, and alcohol  Tell your doctor if you are also using butorphanol, nalbuphine, pentazocine, or a muscle relaxer    Warnings While Using This Medicine:   · Tell your doctor if you are pregnant or breastfeeding, or if you have kidney disease, liver disease (including hepatitis), lung or breathing problems, adrenal gland problems, an enlarged prostate, trouble urinating, gallbladder problems, low thyroid levels, stomach problems, or a history of depression, brain tumor, head injury, alcohol or drug abuse  · This medicine may cause the following problems:  ¨ High risk of overdose, which can lead to death  ¨ Respiratory depression (serious breathing problem that can be life-threatening)  ¨ Liver problems  ¨ Serotonin syndrome, when used with certain medicines  · This medicine may make you dizzy or drowsy  Do not drive or do anything that could be dangerous until you know how this medicine affects you  Stand or sit up slowly if you feel lightheaded or dizzy  · Tell any doctor or dentist who treats you that you are using this medicine  · This medicine can be habit-forming  Do not use more than your prescribed dose  Call your doctor if you think your medicine is not working  · Do not stop using this medicine suddenly  Your doctor will need to slowly decrease your dose before you stop it completely  · This medicine could cause infertility  Talk with your doctor before using this medicine if you plan to have children  · Keep all medicine out of the reach of children  Never share your medicine with anyone    Possible Side Effects While Using This Medicine:   Call your doctor right away if you notice any of these side effects:  · Allergic reaction: Itching or hives, swelling in your face or hands, swelling or tingling in your mouth or throat, chest tightness, trouble breathing  · Blue lips, fingernails, or skin  · Dark urine or pale stools, nausea, vomiting, loss of appetite, stomach pain, yellow skin or eyes  · Extreme dizziness or weakness, shallow breathing, sweating, seizures, cold or clammy skin  · Severe confusion, lightheadedness, dizziness, or fainting  · Trouble breathing or slow breathing  If you notice these less serious side effects, talk with your doctor:   · Headache, trouble sleeping  · Constipation or upset stomach  · Shaking, feeling hot or cold, runny nose, watery eyes, diarrhea, vomiting, and muscle aches  If you notice other side effects that you think are caused by this medicine, tell your doctor  Call your doctor for medical advice about side effects  You may report side effects to FDA at 1-934-WXR-5446  © 2017 [address] [employer] Information is for End User's use only and may not be sold, redistributed or otherwise used for commercial purposes  The above information is an  only  It is not intended as medical advice for individual conditions or treatments  Talk to your doctor, nurse or pharmacist before following any medical regimen to see if it is safe and effective for you

## 2018-12-19 NOTE — PROGRESS NOTES
Assessment/Plan:    No problem-specific Assessment & Plan notes found for this encounter  Diagnoses and all orders for this visit:    Drug dependence [facility])  -     Toxicology screen, urine  -     Suboxone  -     buprenorphine-naloxone (SUBOXONE) 8-2 mg per SL tablet; One and half tabs sl q day  Drug therapy continued  -     Toxicology screen, urine  -     Suboxone    Encounter for monitoring Suboxone maintenance therapy  -     Toxicology screen, urine  -     Suboxone  -     buprenorphine-naloxone (SUBOXONE) 8-2 mg per SL tablet; One and half tabs sl q day  Heroin abuse ([facility] )  -     Toxicology screen, urine  -     Suboxone  -     buprenorphine-naloxone (SUBOXONE) 8-2 mg per SL tablet; One and half tabs sl q day  IV drug abuse ([facility] )  -     Toxicology screen, urine  -     Suboxone  -     buprenorphine-naloxone (SUBOXONE) 8-2 mg per SL tablet; One and half tabs sl q day  A/P: Doing well and will continue with 12mg dose 3/6 and continue with counseling  Reminded to keep meds safe and out reach of children  RTC four weeks for f/u  Subjective:      Patient ID: Jose Christy is a 20 y o  male   RTC for f/u suboxone  Doing well and no c/o's  Not using and no withdraw  Attends counseling  UDT obtained and sent to the lab  Tolerating the meds and no side effects  The following portions of the patient's history were reviewed and updated as appropriate:   He  has a past medical history of Bipolar 2 disorder ([facility] ); MAURICE (generalized anxiety disorder) (10/10/2018); Heroin abuse ([facility] ) (10/10/2018); and Manic behavior ([facility] )    He   Patient Active Problem List    Diagnosis Date Noted    Drug dependence ([facility] ) 10/10/2018    Encounter for monitoring Suboxone maintenance therapy 10/10/2018    Drug therapy continued 10/10/2018    Heroin abuse ([facility] ) 10/10/2018    IV drug abuse ([facility] ) 10/10/2018    Migraine without aura and without status migrainosus, not intractable 10/10/2018    MAURICE (generalized anxiety disorder) 10/10/2018    Tobacco abuse 10/10/2018     He  has a past surgical history that includes Mandible fracture surgery and [place] tooth extraction  His family history includes Hepatitis in his father; Multiple sclerosis in his mother; Seizures in his mother  He  reports that he has been smoking  He has a 3 00 pack-year smoking history  His smokeless tobacco use includes Chew  He reports that he uses drugs, including Amphetamines, Cocaine, Hydrocodone, Heroin, Marijuana, and Oxycodone  He reports that he does not drink alcohol  Current Outpatient Prescriptions   Medication Sig Dispense Refill    buprenorphine-naloxone (SUBOXONE) 8-2 mg per SL tablet One and half tabs sl q day  45 tablet 0    cloNIDine (CATAPRES) 0 1 mg tablet Take 1 tablet (0 1 mg total) by mouth 3 (three) times a day 30 tablet 0    cyclobenzaprine (FLEXERIL) 10 mg tablet Take 1 tablet (10 mg total) by mouth 2 (two) times a day as needed for muscle spasms 20 tablet 0    NARCAN 4 MG/0 1ML LIQD instill 1 spray in 1 NOSTRIL if needed for opioid overdose may re     (REFER TO PRESCRIPTION NOTES)  0    zolpidem (AMBIEN) 10 mg tablet Take 1 tablet (10 mg total) by mouth daily at bedtime as needed for sleep 30 tablet 0    Methylprednisolone 4 MG TBPK Use as directed on package 21 tablet 0     No current facility-administered medications for this visit  Current Outpatient Prescriptions on File Prior to Visit   Medication Sig    cloNIDine (CATAPRES) 0 1 mg tablet Take 1 tablet (0 1 mg total) by mouth 3 (three) times a day    cyclobenzaprine (FLEXERIL) 10 mg tablet Take 1 tablet (10 mg total) by mouth 2 (two) times a day as needed for muscle spasms    NARCAN 4 MG/0 1ML LIQD instill 1 spray in 1 NOSTRIL if needed for opioid overdose may re     (REFER TO PRESCRIPTION NOTES)      zolpidem (AMBIEN) 10 mg tablet Take 1 tablet (10 mg total) by mouth daily at bedtime as needed for sleep    Methylprednisolone 4 MG TBPK Use as directed on package    [DISCONTINUED] buprenorphine-naloxone (SUBOXONE) 8-2 mg per SL tablet One and half tabs sl q day  No current facility-administered medications on file prior to visit  He is allergic to topamax [topiramate]       Review of Systems   Constitutional: Negative for activity change, chills, diaphoresis, fatigue and fever  Respiratory: Negative for cough, chest tightness, shortness of breath and wheezing  Cardiovascular: Negative for chest pain, palpitations and leg swelling  Gastrointestinal: Negative for abdominal pain, constipation, diarrhea, nausea and vomiting  Genitourinary: Negative for difficulty urinating, dysuria and frequency  Musculoskeletal: Negative for arthralgias, gait problem and myalgias  Neurological: Negative for dizziness, seizures, weakness, light-headedness and headaches  Psychiatric/Behavioral: Negative for confusion, dysphoric mood, self-injury, sleep disturbance and suicidal ideas  The patient is not nervous/anxious  Objective:      Ht 5' 7" (1 702 m)   Wt 71 2 kg (157 lb)   BMI 24 59 kg/m²          Physical Exam   Constitutional: He is oriented to person, place, and time  He appears well-developed and well-nourished  No distress  HENT:   Head: Normocephalic and atraumatic  Mouth/Throat: Oropharynx is clear and moist    Cardiovascular: Normal rate, regular rhythm and normal heart sounds  Pulmonary/Chest: Effort normal and breath sounds normal  No respiratory distress  He has no wheezes  Abdominal: Soft  Bowel sounds are normal  He exhibits no distension  There is no tenderness  Neurological: He is alert and oriented to person, place, and time  Psychiatric: He has a normal mood and affect  His behavior is normal  Judgment and thought content normal    Nursing note and vitals reviewed

## 2018-12-22 LAB
BUPRENORPHINE UR CFM-MCNC: 410 NG/ML
BUPRENORPHINE UR QL CFM: NORMAL NG/ML
BUPRENORPHINE UR QL CFM: POSITIVE
BUPRENORPHINE+NOR UR QL: POSITIVE
NORBUPRENORPHINE UR CFM-MCNC: 914 NG/ML
NORBUPRENORPHINE UR QL CFM: POSITIVE

## 2019-01-16 ENCOUNTER — OFFICE VISIT (OUTPATIENT)
Dept: INTERNAL MEDICINE CLINIC | Facility: CLINIC | Age: 24
End: 2019-01-16
Payer: COMMERCIAL

## 2019-01-16 VITALS
HEIGHT: 67 IN | TEMPERATURE: 98.1 F | HEART RATE: 86 BPM | SYSTOLIC BLOOD PRESSURE: 120 MMHG | DIASTOLIC BLOOD PRESSURE: 72 MMHG | BODY MASS INDEX: 24.33 KG/M2 | RESPIRATION RATE: 14 BRPM | WEIGHT: 155 LBS

## 2019-01-16 DIAGNOSIS — Z79.899 ENCOUNTER FOR MONITORING SUBOXONE MAINTENANCE THERAPY: ICD-10-CM

## 2019-01-16 DIAGNOSIS — Z79.899 DRUG THERAPY CONTINUED: ICD-10-CM

## 2019-01-16 DIAGNOSIS — F11.10 HEROIN ABUSE (HCC): ICD-10-CM

## 2019-01-16 DIAGNOSIS — F19.10 IV DRUG ABUSE (HCC): ICD-10-CM

## 2019-01-16 DIAGNOSIS — Z51.81 ENCOUNTER FOR MONITORING SUBOXONE MAINTENANCE THERAPY: ICD-10-CM

## 2019-01-16 DIAGNOSIS — G47.00 INSOMNIA, UNSPECIFIED TYPE: ICD-10-CM

## 2019-01-16 DIAGNOSIS — I10 ESSENTIAL HYPERTENSION: ICD-10-CM

## 2019-01-16 DIAGNOSIS — F19.20 DRUG DEPENDENCE (HCC): Primary | ICD-10-CM

## 2019-01-16 PROCEDURE — 80307 DRUG TEST PRSMV CHEM ANLYZR: CPT | Performed by: INTERNAL MEDICINE

## 2019-01-16 PROCEDURE — 99213 OFFICE O/P EST LOW 20 MIN: CPT | Performed by: INTERNAL MEDICINE

## 2019-01-16 RX ORDER — BUPRENORPHINE HYDROCHLORIDE AND NALOXONE HYDROCHLORIDE DIHYDRATE 8; 2 MG/1; MG/1
TABLET SUBLINGUAL
Qty: 45 TABLET | Refills: 0 | Status: SHIPPED | OUTPATIENT
Start: 2019-01-16 | End: 2019-02-15 | Stop reason: SDUPTHER

## 2019-01-16 RX ORDER — ZOLPIDEM TARTRATE 10 MG/1
10 TABLET ORAL
Qty: 30 TABLET | Refills: 0 | Status: SHIPPED | OUTPATIENT
Start: 2019-01-16 | End: 2019-02-15 | Stop reason: SDUPTHER

## 2019-01-16 RX ORDER — CLONIDINE HYDROCHLORIDE 0.1 MG/1
0.1 TABLET ORAL 3 TIMES DAILY
Qty: 90 TABLET | Refills: 0 | Status: SHIPPED | OUTPATIENT
Start: 2019-01-16 | End: 2019-02-15 | Stop reason: SDUPTHER

## 2019-01-16 NOTE — PROGRESS NOTES
Assessment/Plan:    No problem-specific Assessment & Plan notes found for this encounter  Diagnoses and all orders for this visit:    Drug dependence ([facility] )  -     Suboxone  -     Toxicology screen, urine  -     buprenorphine-naloxone (SUBOXONE) 8-2 mg per SL tablet; One and half tabs sl q day  Drug therapy continued  -     Suboxone  -     Toxicology screen, urine    Encounter for monitoring Suboxone maintenance therapy  -     Suboxone  -     Toxicology screen, urine  -     buprenorphine-naloxone (SUBOXONE) 8-2 mg per SL tablet; One and half tabs sl q day  Heroin abuse ([facility] )  -     Suboxone  -     Toxicology screen, urine  -     buprenorphine-naloxone (SUBOXONE) 8-2 mg per SL tablet; One and half tabs sl q day  [facility] drug [facility] )  -     Suboxone  -     Toxicology screen, urine  -     buprenorphine-naloxone (SUBOXONE) 8-2 mg per SL tablet; One and half tabs sl q day  A/P: Doing well and will continue with 12mg tabs, dose 4/6 and continue with counseling  Reminded to keep meds safe and out of reach of children  RTC four weeks  Subjective:      Patient ID: Helen Cooks is a 20 y o  male   RTC for f/u suboxone  Doing well and no c/o's  Not using and no withdraw  Attends counseling  UDT obtained and sent to the lab  Tolerating the meds and no side effects  The following portions of the patient's history were reviewed and updated as appropriate:   He  has a past medical history of Bipolar 2 disorder ([facility] ); MAURICE (generalized anxiety disorder) (10/10/2018); Heroin abuse ([facility] ) (10/10/2018); and Manic behavior ([facility] )    He   Patient Active Problem List    Diagnosis Date Noted    Drug dependence ([facility] ) 10/10/2018    Encounter for monitoring Suboxone maintenance therapy 10/10/2018    Drug therapy continued 10/10/2018    Heroin abuse ([facility] ) 10/10/2018    IV drug abuse ([facility] ) 10/10/2018    Migraine without aura and without status migrainosus, not intractable 10/10/2018    MAURICE (generalized anxiety disorder) 10/10/2018    Tobacco abuse 10/10/2018     He  has a past surgical history that includes Mandible fracture surgery and [place] tooth extraction  His family history includes Hepatitis in his father; Multiple sclerosis in his mother; Seizures in his mother  He  reports that he has been smoking  He has a 3 00 pack-year smoking history  His smokeless tobacco use includes Chew  He reports that he uses drugs, including Amphetamines, Cocaine, Hydrocodone, Heroin, Marijuana, and Oxycodone  He reports that he does not drink alcohol  Current Outpatient Prescriptions   Medication Sig Dispense Refill    buprenorphine-naloxone (SUBOXONE) 8-2 mg per SL tablet One and half tabs sl q day  45 tablet 0    cloNIDine (CATAPRES) 0 1 mg tablet Take 1 tablet (0 1 mg total) by mouth 3 (three) times a day 90 tablet 0    cyclobenzaprine (FLEXERIL) 10 mg tablet Take 1 tablet (10 mg total) by mouth 2 (two) times a day as needed for muscle spasms 20 tablet 0    NARCAN 4 MG/0 1ML LIQD instill 1 spray in 1 NOSTRIL if needed for opioid overdose may re     (REFER TO PRESCRIPTION NOTES)  0    zolpidem (AMBIEN) 10 mg tablet Take 1 tablet (10 mg total) by mouth daily at bedtime as needed for sleep 30 tablet 0     No current facility-administered medications for this visit  Current Outpatient Prescriptions on File Prior to Visit   Medication Sig    cloNIDine (CATAPRES) 0 1 mg tablet Take 1 tablet (0 1 mg total) by mouth 3 (three) times a day    cyclobenzaprine (FLEXERIL) 10 mg tablet Take 1 tablet (10 mg total) by mouth 2 (two) times a day as needed for muscle spasms    NARCAN 4 MG/0 1ML LIQD instill 1 spray in 1 NOSTRIL if needed for opioid overdose may re     (REFER TO PRESCRIPTION NOTES)   zolpidem (AMBIEN) 10 mg tablet Take 1 tablet (10 mg total) by mouth daily at bedtime as needed for sleep    [DISCONTINUED] buprenorphine-naloxone (SUBOXONE) 8-2 mg per SL tablet One and half tabs sl q day  No current facility-administered medications on file prior to visit  He is allergic to topamax [topiramate]       Review of Systems   Constitutional: Negative for activity change, chills, diaphoresis, fatigue and fever  Respiratory: Negative for cough, chest tightness, shortness of breath and wheezing  Cardiovascular: Negative for chest pain, palpitations and leg swelling  Gastrointestinal: Negative for abdominal pain, constipation, diarrhea, nausea and vomiting  Genitourinary: Negative for difficulty urinating, dysuria and frequency  Musculoskeletal: Negative for arthralgias, gait problem and myalgias  Neurological: Negative for dizziness, seizures, syncope, weakness, light-headedness and headaches  Psychiatric/Behavioral: Negative for confusion, dysphoric mood, self-injury, sleep disturbance and suicidal ideas  The patient is not nervous/anxious  Objective:      /72   Pulse 86   Temp 98 1 °F (36 7 °C) (Tympanic)   Resp 14   Ht 5' 7" (1 702 m)   Wt 70 3 kg (155 lb)   BMI 24 28 kg/m²          Physical Exam   Constitutional: He is oriented to person, place, and time  He appears well-developed and well-nourished  No distress  HENT:   Head: Normocephalic and atraumatic  Mouth/Throat: Oropharynx is clear and moist    Eyes: Pupils are equal, round, and reactive to light  Conjunctivae and EOM are normal    Cardiovascular: Normal rate, regular rhythm and normal heart sounds  No murmur heard  Pulmonary/Chest: Effort normal and breath sounds normal  No respiratory distress  He has no wheezes  He has no rales  Abdominal: Soft  Bowel sounds are normal  He exhibits no distension  There is no tenderness  Neurological: He is alert and oriented to person, place, and time  Psychiatric: He has a normal mood and affect  His behavior is normal  Judgment and thought content normal    Nursing note and vitals reviewed

## 2019-01-16 NOTE — PATIENT INSTRUCTIONS
Buprenorphine/Naloxone (Into the mouth)   Buprenorphine (bue-pre-NOR-feen), Naloxone (nal-OX-one)  Treats narcotic dependence  Brand Name(s): Bunavail, Suboxone, Zubsolv   There may be other brand names for this medicine  When This Medicine Should Not Be Used: This medicine is not right for everyone  Do not use it if you had an allergic reaction to buprenorphine or naloxone  How to Use This Medicine: Thin Sheet, Tablet  · Take your medicine as directed  Your dose may need to be changed several times to find what works best for you  · You must let the medicine dissolve  Never swallow the film or tablet  Your body may not absorb enough of the medicine if you swallow it  · Your health caregiver should show you how to use the medicine  If you do not understand, ask for help  It is important to use the medicine correctly  · Do not talk while the medicine is in your mouth  · Buccal film: Rinse your mouth with water to moisten it  Place the film against the inside of your cheek  If your doctor told you to use more than 1 film, place the second film inside your other cheek  Do not place more than 2 films inside of 1 cheek at a time  Do not move or touch the film  Do not eat or drink anything until the film is completely dissolved  · Sublingual tablet: Place the tablet under your tongue  If your doctor told you to use more than 1 tablet, place all of the tablets in different places under your tongue at the same time  You can use 2 tablets at a time until you have taken all of the medicine, if that is easier for you  Let the tablets dissolve completely in your mouth  Do not eat or drink anything until the tablets are completely dissolved  · Sublingual film: Drink some water to help moisten your mouth  Place the film under your tongue  If your doctor told you to use more than 1 film, place the second film on the opposite side from the first one  Do not move the film after you place it under your tongue   If you are supposed to use more than 2 films, use them the same way, but do not start until the first 2 films are completely dissolved  · Do not break, crush, chew, or cut the film or tablet  · This medicine should come with a Medication Guide  Ask your pharmacist for a copy if you do not have one  · Missed dose: Take a dose as soon as you remember  If it is almost time for your next dose, wait until then and take a regular dose  Do not take extra medicine to make up for a missed dose  · Store the medicine in a closed container at room temperature, away from heat, moisture, and direct light  Ask your pharmacist about the best way to dispose of medicine you do not use  Drugs and Foods to Avoid:   Ask your doctor or pharmacist before using any other medicine, including over-the-counter medicines, vitamins, and herbal products  · Do not use this medicine if you are using or have used an MAO inhibitor within the past 14 days  · Some medicines can affect how buprenorphine/naloxone works  Tell your doctor if you are using the following:   ¨ Carbamazepine, erythromycin, ketoconazole, mirtazapine, phenobarbital, phenytoin, rifampin, tramadol, or trazodone  ¨ Medicine to treat depression  ¨ Medicine to treat HIV/AIDS (including atazanavir, delavirdine, efavirenz, etravirine, nevirapine, ritonavir)  ¨ Phenothiazine medicine  · Do not drink alcohol while you are using this medicine  · Tell your doctor if you use anything else that makes you sleepy  Some examples are allergy medicine, narcotic pain medicine, and alcohol  Tell your doctor if you are also using butorphanol, nalbuphine, pentazocine, or a muscle relaxer    Warnings While Using This Medicine:   · Tell your doctor if you are pregnant or breastfeeding, or if you have kidney disease, liver disease (including hepatitis), lung or breathing problems, adrenal gland problems, an enlarged prostate, trouble urinating, gallbladder problems, low thyroid levels, stomach problems, or a history of depression, brain tumor, head injury, alcohol or drug abuse  · This medicine may cause the following problems:  ¨ High risk of overdose, which can lead to death  ¨ Respiratory depression (serious breathing problem that can be life-threatening)  ¨ Liver problems  ¨ Serotonin syndrome, when used with certain medicines  · This medicine may make you dizzy or drowsy  Do not drive or do anything that could be dangerous until you know how this medicine affects you  Stand or sit up slowly if you feel lightheaded or dizzy  · Tell any doctor or dentist who treats you that you are using this medicine  · This medicine can be habit-forming  Do not use more than your prescribed dose  Call your doctor if you think your medicine is not working  · Do not stop using this medicine suddenly  Your doctor will need to slowly decrease your dose before you stop it completely  · This medicine could cause infertility  Talk with your doctor before using this medicine if you plan to have children  · Keep all medicine out of the reach of children  Never share your medicine with anyone    Possible Side Effects While Using This Medicine:   Call your doctor right away if you notice any of these side effects:  · Allergic reaction: Itching or hives, swelling in your face or hands, swelling or tingling in your mouth or throat, chest tightness, trouble breathing  · Blue lips, fingernails, or skin  · Dark urine or pale stools, nausea, vomiting, loss of appetite, stomach pain, yellow skin or eyes  · Extreme dizziness or weakness, shallow breathing, sweating, seizures, cold or clammy skin  · Severe confusion, lightheadedness, dizziness, or fainting  · Trouble breathing or slow breathing  If you notice these less serious side effects, talk with your doctor:   · Headache, trouble sleeping  · Constipation or upset stomach  · Shaking, feeling hot or cold, runny nose, watery eyes, diarrhea, vomiting, and muscle aches  If you notice other side effects that you think are caused by this medicine, tell your doctor  Call your doctor for medical advice about side effects  You may report side effects to FDA at 2-925-ECJ-4730  © 2017 [address] [employer] Information is for End User's use only and may not be sold, redistributed or otherwise used for commercial purposes  The above information is an  only  It is not intended as medical advice for individual conditions or treatments  Talk to your doctor, nurse or pharmacist before following any medical regimen to see if it is safe and effective for you

## 2019-01-18 ENCOUNTER — OFFICE VISIT (OUTPATIENT)
Dept: INTERNAL MEDICINE CLINIC | Facility: CLINIC | Age: 24
End: 2019-01-18
Payer: COMMERCIAL

## 2019-01-18 VITALS
TEMPERATURE: 97.9 F | SYSTOLIC BLOOD PRESSURE: 106 MMHG | HEART RATE: 80 BPM | RESPIRATION RATE: 16 BRPM | DIASTOLIC BLOOD PRESSURE: 64 MMHG | HEIGHT: 67 IN | BODY MASS INDEX: 24.17 KG/M2 | WEIGHT: 154 LBS

## 2019-01-18 DIAGNOSIS — Z02.4 ENCOUNTER FOR DRIVER'S LICENSE HISTORY AND PHYSICAL: Primary | ICD-10-CM

## 2019-01-18 PROCEDURE — 99395 PREV VISIT EST AGE 18-39: CPT | Performed by: INTERNAL MEDICINE

## 2019-01-18 NOTE — PROGRESS NOTES
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  Doing well and no c/o's  Remains active w/o difficulty and no falls  No recent illnesses  No fever, chills, or sweats  No sz or syncope  NO CP, SOB, edema, palpitations, orthopnea, or PND  NO SHAKIRA  Remains off drugs and is in the suboxone program and is compliant  No changes in PMH, PSH, All, meds, OH, SH, or Fhx  The following portions of the patient's history were reviewed and updated as appropriate:   He  has a past medical history of Bipolar 2 disorder ([email or web address] ); MAURICE (generalized anxiety disorder) (10/10/2018); Heroin abuse ([email or web address] ) (10/10/2018); and Manic behavior ([email or web address] 75 )  He   Patient Active Problem List    Diagnosis Date Noted    Drug dependence ([email or web address] ) 10/10/2018    Encounter for monitoring Suboxone maintenance therapy 10/10/2018    Drug therapy continued 10/10/2018    Heroin abuse ([email or web address] ) 10/10/2018    IV drug abuse ([email or web address] ) 10/10/2018    Migraine without aura and without status migrainosus, not intractable 10/10/2018    MAURICE (generalized anxiety disorder) 10/10/2018    Tobacco abuse 10/10/2018     He  has a past surgical history that includes Mandible fracture surgery and [place] tooth extraction  His family history includes Hepatitis in his father; Multiple sclerosis in his mother; Seizures in his mother  He  reports that he has been smoking  He has a 3 00 pack-year smoking history  His smokeless tobacco use includes Chew   He reports that he uses drugs, including Amphetamines, Cocaine, Hydrocodone, Heroin, Marijuana, and Oxycodone  He reports that he does not drink alcohol  Current Outpatient Prescriptions   Medication Sig Dispense Refill    buprenorphine-naloxone (SUBOXONE) 8-2 mg per SL tablet One and half tabs sl q day  45 tablet 0    cloNIDine (CATAPRES) 0 1 mg tablet Take 1 tablet (0 1 mg total) by mouth 3 (three) times a day 90 tablet 0    cyclobenzaprine (FLEXERIL) 10 mg tablet Take 1 tablet (10 mg total) by mouth 2 (two) times a day as needed for muscle spasms 20 tablet 0    NARCAN 4 MG/0 1ML LIQD instill 1 spray in 1 NOSTRIL if needed for opioid overdose may re     (REFER TO PRESCRIPTION NOTES)  0    zolpidem (AMBIEN) 10 mg tablet Take 1 tablet (10 mg total) by mouth daily at bedtime as needed for sleep 30 tablet 0     No current facility-administered medications for this visit  Current Outpatient Prescriptions on File Prior to Visit   Medication Sig    buprenorphine-naloxone (SUBOXONE) 8-2 mg per SL tablet One and half tabs sl q day   cloNIDine (CATAPRES) 0 1 mg tablet Take 1 tablet (0 1 mg total) by mouth 3 (three) times a day    cyclobenzaprine (FLEXERIL) 10 mg tablet Take 1 tablet (10 mg total) by mouth 2 (two) times a day as needed for muscle spasms    NARCAN 4 MG/0 1ML LIQD instill 1 spray in 1 NOSTRIL if needed for opioid overdose may re     (REFER TO PRESCRIPTION NOTES)   zolpidem (AMBIEN) 10 mg tablet Take 1 tablet (10 mg total) by mouth daily at bedtime as needed for sleep     No current facility-administered medications on file prior to visit  He is allergic to topamax [topiramate]       Review of Systems   Constitutional: Negative for activity change, chills, diaphoresis, fatigue and fever  HENT: Negative  Eyes: Negative for visual disturbance  Respiratory: Negative for cough, chest tightness, shortness of breath and wheezing  Cardiovascular: Negative for chest pain, palpitations and leg swelling     Gastrointestinal: Negative for abdominal pain, constipation, diarrhea, nausea and vomiting  Genitourinary: Negative for difficulty urinating, dysuria and frequency  Musculoskeletal: Negative for arthralgias, gait problem and myalgias  Neurological: Negative for dizziness, tremors, seizures, syncope, weakness, light-headedness and headaches  Psychiatric/Behavioral: Negative for confusion, dysphoric mood, sleep disturbance and suicidal ideas  The patient is not nervous/anxious  Objective:      /64   Pulse 80   Temp 97 9 °F (36 6 °C) (Tympanic)   Resp 16   Ht 5' 7" (1 702 m)   Wt 69 9 kg (154 lb)   BMI 24 12 kg/m²          Physical Exam   Constitutional: He is oriented to person, place, and time  He appears well-developed and well-nourished  No distress  HENT:   Head: Normocephalic and atraumatic  Right Ear: External ear normal    Left Ear: External ear normal    Mouth/Throat: Oropharynx is clear and moist    Hearing ok with the 15 foot whisper   Eyes: Pupils are equal, round, and reactive to light  Conjunctivae and EOM are normal    Neck: Normal range of motion  Neck supple  No JVD present  No tracheal deviation present  No thyromegaly present  Cardiovascular: Normal rate, regular rhythm and normal heart sounds  No murmur heard  Pulmonary/Chest: Effort normal and breath sounds normal  No respiratory distress  He has no wheezes  He has no rales  Abdominal: Soft  Bowel sounds are normal  He exhibits no distension  There is no tenderness  Musculoskeletal: Normal range of motion  He exhibits no edema, tenderness or deformity  Lymphadenopathy:     He has no cervical adenopathy  Neurological: He is alert and oriented to person, place, and time  He has normal reflexes  No cranial nerve deficit  Coordination normal    Psychiatric: He has a normal mood and affect  His behavior is normal  Judgment and thought content normal    Nursing note and vitals reviewed

## 2019-01-21 LAB
BUPRENORPHINE UR CFM-MCNC: 180 NG/ML
BUPRENORPHINE UR QL CFM: NORMAL NG/ML
BUPRENORPHINE UR QL CFM: POSITIVE
BUPRENORPHINE+NOR UR QL: POSITIVE
NORBUPRENORPHINE UR CFM-MCNC: 756 NG/ML
NORBUPRENORPHINE UR QL CFM: POSITIVE

## 2019-02-15 ENCOUNTER — OFFICE VISIT (OUTPATIENT)
Dept: INTERNAL MEDICINE CLINIC | Facility: CLINIC | Age: 24
End: 2019-02-15
Payer: COMMERCIAL

## 2019-02-15 VITALS
RESPIRATION RATE: 14 BRPM | SYSTOLIC BLOOD PRESSURE: 112 MMHG | DIASTOLIC BLOOD PRESSURE: 72 MMHG | BODY MASS INDEX: 23.7 KG/M2 | HEIGHT: 67 IN | WEIGHT: 151 LBS | HEART RATE: 78 BPM | TEMPERATURE: 98.2 F

## 2019-02-15 DIAGNOSIS — F19.20 DRUG DEPENDENCE (HCC): Primary | ICD-10-CM

## 2019-02-15 DIAGNOSIS — I10 ESSENTIAL HYPERTENSION: ICD-10-CM

## 2019-02-15 DIAGNOSIS — F19.10 IV DRUG ABUSE (HCC): ICD-10-CM

## 2019-02-15 DIAGNOSIS — G47.00 INSOMNIA, UNSPECIFIED TYPE: ICD-10-CM

## 2019-02-15 DIAGNOSIS — Z79.899 DRUG THERAPY CONTINUED: ICD-10-CM

## 2019-02-15 DIAGNOSIS — F11.10 HEROIN ABUSE (HCC): ICD-10-CM

## 2019-02-15 DIAGNOSIS — Z79.899 ENCOUNTER FOR MONITORING SUBOXONE MAINTENANCE THERAPY: ICD-10-CM

## 2019-02-15 DIAGNOSIS — Z51.81 ENCOUNTER FOR MONITORING SUBOXONE MAINTENANCE THERAPY: ICD-10-CM

## 2019-02-15 PROCEDURE — 99213 OFFICE O/P EST LOW 20 MIN: CPT | Performed by: INTERNAL MEDICINE

## 2019-02-15 PROCEDURE — 80307 DRUG TEST PRSMV CHEM ANLYZR: CPT | Performed by: INTERNAL MEDICINE

## 2019-02-15 RX ORDER — ZOLPIDEM TARTRATE 10 MG/1
10 TABLET ORAL
Qty: 30 TABLET | Refills: 0 | Status: SHIPPED | OUTPATIENT
Start: 2019-02-15 | End: 2019-03-13 | Stop reason: SDUPTHER

## 2019-02-15 RX ORDER — BUPRENORPHINE HYDROCHLORIDE AND NALOXONE HYDROCHLORIDE DIHYDRATE 8; 2 MG/1; MG/1
TABLET SUBLINGUAL
Qty: 45 TABLET | Refills: 0 | Status: SHIPPED | OUTPATIENT
Start: 2019-02-15 | End: 2019-03-13 | Stop reason: SDUPTHER

## 2019-02-15 RX ORDER — CLONIDINE HYDROCHLORIDE 0.1 MG/1
0.1 TABLET ORAL 3 TIMES DAILY
Qty: 90 TABLET | Refills: 0 | Status: SHIPPED | OUTPATIENT
Start: 2019-02-15 | End: 2019-03-13 | Stop reason: SDUPTHER

## 2019-02-15 NOTE — PATIENT INSTRUCTIONS
Buprenorphine/Naloxone (Into the mouth)   Buprenorphine (bue-pre-NOR-feen), Naloxone (nal-OX-one)  Treats narcotic dependence  Brand Name(s): Bunavail, Suboxone, Zubsolv   There may be other brand names for this medicine  When This Medicine Should Not Be Used: This medicine is not right for everyone  Do not use it if you had an allergic reaction to buprenorphine or naloxone  How to Use This Medicine: Thin Sheet, Tablet  · Take your medicine as directed  Your dose may need to be changed several times to find what works best for you  · You must let the medicine dissolve  Never swallow the film or tablet  Your body may not absorb enough of the medicine if you swallow it  · Your health caregiver should show you how to use the medicine  If you do not understand, ask for help  It is important to use the medicine correctly  · Do not talk while the medicine is in your mouth  · Buccal film: Rinse your mouth with water to moisten it  Place the film against the inside of your cheek  If your doctor told you to use more than 1 film, place the second film inside your other cheek  Do not place more than 2 films inside of 1 cheek at a time  Do not move or touch the film  Do not eat or drink anything until the film is completely dissolved  · Sublingual tablet: Place the tablet under your tongue  If your doctor told you to use more than 1 tablet, place all of the tablets in different places under your tongue at the same time  You can use 2 tablets at a time until you have taken all of the medicine, if that is easier for you  Let the tablets dissolve completely in your mouth  Do not eat or drink anything until the tablets are completely dissolved  · Sublingual film: Drink some water to help moisten your mouth  Place the film under your tongue  If your doctor told you to use more than 1 film, place the second film on the opposite side from the first one  Do not move the film after you place it under your tongue   If you are supposed to use more than 2 films, use them the same way, but do not start until the first 2 films are completely dissolved  · Do not break, crush, chew, or cut the film or tablet  · This medicine should come with a Medication Guide  Ask your pharmacist for a copy if you do not have one  · Missed dose: Take a dose as soon as you remember  If it is almost time for your next dose, wait until then and take a regular dose  Do not take extra medicine to make up for a missed dose  · Store the medicine in a closed container at room temperature, away from heat, moisture, and direct light  Ask your pharmacist about the best way to dispose of medicine you do not use  Drugs and Foods to Avoid:   Ask your doctor or pharmacist before using any other medicine, including over-the-counter medicines, vitamins, and herbal products  · Do not use this medicine if you are using or have used an MAO inhibitor within the past 14 days  · Some medicines can affect how buprenorphine/naloxone works  Tell your doctor if you are using the following:   ¨ Carbamazepine, erythromycin, ketoconazole, mirtazapine, phenobarbital, phenytoin, rifampin, tramadol, or trazodone  ¨ Medicine to treat depression  ¨ Medicine to treat HIV/AIDS (including atazanavir, delavirdine, efavirenz, etravirine, nevirapine, ritonavir)  ¨ Phenothiazine medicine  · Do not drink alcohol while you are using this medicine  · Tell your doctor if you use anything else that makes you sleepy  Some examples are allergy medicine, narcotic pain medicine, and alcohol  Tell your doctor if you are also using butorphanol, nalbuphine, pentazocine, or a muscle relaxer    Warnings While Using This Medicine:   · Tell your doctor if you are pregnant or breastfeeding, or if you have kidney disease, liver disease (including hepatitis), lung or breathing problems, adrenal gland problems, an enlarged prostate, trouble urinating, gallbladder problems, low thyroid levels, stomach problems, or a history of depression, brain tumor, head injury, alcohol or drug abuse  · This medicine may cause the following problems:  ¨ High risk of overdose, which can lead to death  ¨ Respiratory depression (serious breathing problem that can be life-threatening)  ¨ Liver problems  ¨ Serotonin syndrome, when used with certain medicines  · This medicine may make you dizzy or drowsy  Do not drive or do anything that could be dangerous until you know how this medicine affects you  Stand or sit up slowly if you feel lightheaded or dizzy  · Tell any doctor or dentist who treats you that you are using this medicine  · This medicine can be habit-forming  Do not use more than your prescribed dose  Call your doctor if you think your medicine is not working  · Do not stop using this medicine suddenly  Your doctor will need to slowly decrease your dose before you stop it completely  · This medicine could cause infertility  Talk with your doctor before using this medicine if you plan to have children  · Keep all medicine out of the reach of children  Never share your medicine with anyone    Possible Side Effects While Using This Medicine:   Call your doctor right away if you notice any of these side effects:  · Allergic reaction: Itching or hives, swelling in your face or hands, swelling or tingling in your mouth or throat, chest tightness, trouble breathing  · Blue lips, fingernails, or skin  · Dark urine or pale stools, nausea, vomiting, loss of appetite, stomach pain, yellow skin or eyes  · Extreme dizziness or weakness, shallow breathing, sweating, seizures, cold or clammy skin  · Severe confusion, lightheadedness, dizziness, or fainting  · Trouble breathing or slow breathing  If you notice these less serious side effects, talk with your doctor:   · Headache, trouble sleeping  · Constipation or upset stomach  · Shaking, feeling hot or cold, runny nose, watery eyes, diarrhea, vomiting, and muscle aches  If you notice other side effects that you think are caused by this medicine, tell your doctor  Call your doctor for medical advice about side effects  You may report side effects to FDA at 0-687-SCO-7243  © 2017 [employer] [employer] Information is for End User's use only and may not be sold, redistributed or otherwise used for commercial purposes  The above information is an  only  It is not intended as medical advice for individual conditions or treatments  Talk to your doctor, nurse or pharmacist before following any medical regimen to see if it is safe and effective for you

## 2019-02-15 NOTE — PROGRESS NOTES
Assessment/Plan:    No problem-specific Assessment & Plan notes found for this encounter  Diagnoses and all orders for this visit:    Drug dependence ([facility] )  -     Suboxone  -     Toxicology screen, urine  -     buprenorphine-naloxone (SUBOXONE) 8-2 mg per SL tablet; One and half tabs sl q day  Drug therapy continued  -     Suboxone  -     Toxicology screen, urine    Encounter for monitoring Suboxone maintenance therapy  -     Suboxone  -     Toxicology screen, urine  -     buprenorphine-naloxone (SUBOXONE) 8-2 mg per SL tablet; One and half tabs sl q day  Heroin abuse ([facility] )  -     Suboxone  -     Toxicology screen, urine  -     buprenorphine-naloxone (SUBOXONE) 8-2 mg per SL tablet; One and half tabs sl q day  [facility]  -     Suboxone  -     Toxicology screen, urine  -     buprenorphine-naloxone (SUBOXONE) 8-2 mg per SL tablet; One and half tabs sl q day  Essential hypertension  -     cloNIDine (CATAPRES) 0 1 mg tablet; Take 1 tablet (0 1 mg total) by mouth 3 (three) times a day      A/P: Doing well and will continue with 12mg tabs, dose 5/6 and continue with counseling  Reminded to keep meds safe and out of reach of children  RTC four weeks  Subjective:      Patient ID: Helen Cooks is a 20 y o  male  WM RTC for f/u suboxone  Doing well and no withdraw  Attends counseling  Denies using or any withdraw  UDT obtained and sent to the lab  Tolerating the meds and no side effects  The following portions of the patient's history were reviewed and updated as appropriate: WM   He  has a past medical history of Bipolar 2 disorder ([facility] [address] ), MAURICE (generalized anxiety disorder) (10/10/2018), Heroin abuse ([facility] ) (10/10/2018), and Manic behavior ([facility] )    He   Patient Active Problem List    Diagnosis Date Noted    [facility] 10/10/2018    Encounter for monitoring Suboxone maintenance therapy 10/10/2018    Drug therapy continued 10/10/2018    Heroin abuse ([facility] ) 10/10/2018  IV drug abuse ([facility] [address] [facility] 10/10/2018    Migraine without aura and without status migrainosus, not intractable 10/10/2018    MAURICE (generalized anxiety disorder) 10/10/2018    Tobacco abuse 10/10/2018     He  has a past surgical history that includes Mandible fracture surgery and [place] tooth extraction  His family history includes Hepatitis in his father; Multiple sclerosis in his mother; Seizures in his mother  He  reports that he has been smoking  He has a 3 00 pack-year smoking history  His smokeless tobacco use includes chew  He reports that he has current or past drug history  Drugs: Amphetamines, Cocaine, Hydrocodone, Heroin, Marijuana, and Oxycodone  He reports that he does not drink alcohol  Current Outpatient Medications   Medication Sig Dispense Refill    buprenorphine-naloxone (SUBOXONE) 8-2 mg per SL tablet One and half tabs sl q day  45 tablet 0    cloNIDine (CATAPRES) 0 1 mg tablet Take 1 tablet (0 1 mg total) by mouth 3 (three) times a day 90 tablet 0    NARCAN 4 MG/0 1ML LIQD instill 1 spray in 1 NOSTRIL if needed for opioid overdose may re     (REFER TO PRESCRIPTION NOTES)  0    zolpidem (AMBIEN) 10 mg tablet Take 1 tablet (10 mg total) by mouth daily at bedtime as needed for sleep 30 tablet 0     No current facility-administered medications for this visit  Current Outpatient Medications on File Prior to Visit   Medication Sig    NARCAN 4 MG/0 1ML LIQD instill 1 spray in 1 NOSTRIL if needed for opioid overdose may re     (REFER TO PRESCRIPTION NOTES)   zolpidem (AMBIEN) 10 mg tablet Take 1 tablet (10 mg total) by mouth daily at bedtime as needed for sleep    [DISCONTINUED] buprenorphine-naloxone (SUBOXONE) 8-2 mg per SL tablet One and half tabs sl q day      [DISCONTINUED] cloNIDine (CATAPRES) 0 1 mg tablet Take 1 tablet (0 1 mg total) by mouth 3 (three) times a day    [DISCONTINUED] cyclobenzaprine (FLEXERIL) 10 mg tablet Take 1 tablet (10 mg total) by mouth 2 (two) times a day as needed for muscle spasms (Patient not taking: Reported on 2/15/2019)     No current facility-administered medications on file prior to visit  He is allergic to topamax [topiramate]       Review of Systems   Constitutional: Negative for activity change, chills, diaphoresis, fatigue and fever  Respiratory: Negative for cough, chest tightness, shortness of breath and wheezing  Cardiovascular: Negative for chest pain, palpitations and leg swelling  Gastrointestinal: Negative for abdominal pain, constipation, diarrhea, nausea and vomiting  Genitourinary: Negative for difficulty urinating, dysuria and frequency  Musculoskeletal: Negative for arthralgias, gait problem and myalgias  Neurological: Negative for dizziness, seizures, syncope, weakness, light-headedness and headaches  Psychiatric/Behavioral: Negative for confusion, dysphoric mood, self-injury, sleep disturbance and suicidal ideas  The patient is not nervous/anxious  Objective:      /72   Pulse 78   Temp 98 2 °F (36 8 °C) (Tympanic)   Resp 14   Ht 5' 7" (1 702 m)   Wt 68 5 kg (151 lb)   BMI 23 65 kg/m²          Physical Exam   Constitutional: He is oriented to person, place, and time  He appears well-developed and well-nourished  No distress  HENT:   Head: Normocephalic and atraumatic  Mouth/Throat: Oropharynx is clear and moist    Eyes: Pupils are equal, round, and reactive to light  Conjunctivae and EOM are normal    Cardiovascular: Normal rate, regular rhythm and normal heart sounds  Pulmonary/Chest: Effort normal and breath sounds normal  No respiratory distress  He has no wheezes  He has no rales  Abdominal: Soft  Bowel sounds are normal  He exhibits no distension  There is no tenderness  Neurological: He is alert and oriented to person, place, and time  Psychiatric: He has a normal mood and affect  His behavior is normal  Judgment and thought content normal    Nursing note and vitals reviewed

## 2019-02-16 LAB
AMPHETAMINES UR QL SCN: NEGATIVE NG/ML
BARBITURATES UR QL SCN: NEGATIVE NG/ML
BENZODIAZ UR QL: NEGATIVE NG/ML
BZE UR QL: NEGATIVE NG/ML
CANNABINOIDS UR QL SCN: NEGATIVE NG/ML
METHADONE UR QL SCN: NEGATIVE NG/ML
OPIATES UR QL: NEGATIVE NG/ML
PCP UR QL: NEGATIVE NG/ML
PROPOXYPH UR QL SCN: NEGATIVE NG/ML

## 2019-02-20 LAB
BUPRENORPHINE UR CFM-MCNC: 540 NG/ML
BUPRENORPHINE UR QL CFM: NORMAL NG/ML
BUPRENORPHINE UR QL CFM: POSITIVE
BUPRENORPHINE+NOR UR QL: POSITIVE
NORBUPRENORPHINE UR CFM-MCNC: 1418 NG/ML
NORBUPRENORPHINE UR QL CFM: POSITIVE

## 2019-03-13 ENCOUNTER — OFFICE VISIT (OUTPATIENT)
Dept: INTERNAL MEDICINE CLINIC | Facility: CLINIC | Age: 24
End: 2019-03-13
Payer: COMMERCIAL

## 2019-03-13 VITALS
DIASTOLIC BLOOD PRESSURE: 74 MMHG | WEIGHT: 160 LBS | RESPIRATION RATE: 14 BRPM | TEMPERATURE: 98.1 F | HEART RATE: 78 BPM | HEIGHT: 67 IN | BODY MASS INDEX: 25.11 KG/M2 | SYSTOLIC BLOOD PRESSURE: 118 MMHG

## 2019-03-13 DIAGNOSIS — I10 ESSENTIAL HYPERTENSION: ICD-10-CM

## 2019-03-13 DIAGNOSIS — Z79.899 DRUG THERAPY CONTINUED: ICD-10-CM

## 2019-03-13 DIAGNOSIS — F11.10 HEROIN ABUSE (HCC): ICD-10-CM

## 2019-03-13 DIAGNOSIS — F19.20 DRUG DEPENDENCE (HCC): Primary | ICD-10-CM

## 2019-03-13 DIAGNOSIS — F19.10 IV DRUG ABUSE (HCC): ICD-10-CM

## 2019-03-13 DIAGNOSIS — G47.00 INSOMNIA, UNSPECIFIED TYPE: ICD-10-CM

## 2019-03-13 DIAGNOSIS — Z79.899 ENCOUNTER FOR MONITORING SUBOXONE MAINTENANCE THERAPY: ICD-10-CM

## 2019-03-13 DIAGNOSIS — Z51.81 ENCOUNTER FOR MONITORING SUBOXONE MAINTENANCE THERAPY: ICD-10-CM

## 2019-03-13 PROCEDURE — 3074F SYST BP LT 130 MM HG: CPT | Performed by: INTERNAL MEDICINE

## 2019-03-13 PROCEDURE — 3008F BODY MASS INDEX DOCD: CPT | Performed by: INTERNAL MEDICINE

## 2019-03-13 PROCEDURE — 80307 DRUG TEST PRSMV CHEM ANLYZR: CPT | Performed by: INTERNAL MEDICINE

## 2019-03-13 PROCEDURE — 3078F DIAST BP <80 MM HG: CPT | Performed by: INTERNAL MEDICINE

## 2019-03-13 PROCEDURE — 99214 OFFICE O/P EST MOD 30 MIN: CPT | Performed by: INTERNAL MEDICINE

## 2019-03-13 RX ORDER — CLONIDINE HYDROCHLORIDE 0.1 MG/1
0.1 TABLET ORAL 3 TIMES DAILY
Qty: 90 TABLET | Refills: 0 | Status: SHIPPED | OUTPATIENT
Start: 2019-03-13 | End: 2019-04-10 | Stop reason: SDUPTHER

## 2019-03-13 RX ORDER — BUPRENORPHINE HYDROCHLORIDE AND NALOXONE HYDROCHLORIDE DIHYDRATE 8; 2 MG/1; MG/1
TABLET SUBLINGUAL
Qty: 45 TABLET | Refills: 0 | Status: SHIPPED | OUTPATIENT
Start: 2019-03-13 | End: 2019-04-10

## 2019-03-13 RX ORDER — ZOLPIDEM TARTRATE 10 MG/1
10 TABLET ORAL
Qty: 30 TABLET | Refills: 0 | Status: SHIPPED | OUTPATIENT
Start: 2019-03-13 | End: 2019-04-10 | Stop reason: SDUPTHER

## 2019-03-13 NOTE — PATIENT INSTRUCTIONS
Buprenorphine/Naloxone (Into the mouth)   Buprenorphine (bue-pre-NOR-feen), Naloxone (nal-OX-one)  Treats narcotic dependence  Brand Name(s): Bunavail, Suboxone, Zubsolv   There may be other brand names for this medicine  When This Medicine Should Not Be Used: This medicine is not right for everyone  Do not use it if you had an allergic reaction to buprenorphine or naloxone  How to Use This Medicine: Thin Sheet, Tablet  · Take your medicine as directed  Your dose may need to be changed several times to find what works best for you  · You must let the medicine dissolve  Never swallow the film or tablet  Your body may not absorb enough of the medicine if you swallow it  · Your health caregiver should show you how to use the medicine  If you do not understand, ask for help  It is important to use the medicine correctly  · Do not talk while the medicine is in your mouth  · Buccal film: Rinse your mouth with water to moisten it  Place the film against the inside of your cheek  If your doctor told you to use more than 1 film, place the second film inside your other cheek  Do not place more than 2 films inside of 1 cheek at a time  Do not move or touch the film  Do not eat or drink anything until the film is completely dissolved  · Sublingual tablet: Place the tablet under your tongue  If your doctor told you to use more than 1 tablet, place all of the tablets in different places under your tongue at the same time  You can use 2 tablets at a time until you have taken all of the medicine, if that is easier for you  Let the tablets dissolve completely in your mouth  Do not eat or drink anything until the tablets are completely dissolved  · Sublingual film: Drink some water to help moisten your mouth  Place the film under your tongue  If your doctor told you to use more than 1 film, place the second film on the opposite side from the first one  Do not move the film after you place it under your tongue   If you are supposed to use more than 2 films, use them the same way, but do not start until the first 2 films are completely dissolved  · Do not break, crush, chew, or cut the film or tablet  · This medicine should come with a Medication Guide  Ask your pharmacist for a copy if you do not have one  · Missed dose: Take a dose as soon as you remember  If it is almost time for your next dose, wait until then and take a regular dose  Do not take extra medicine to make up for a missed dose  · Store the medicine in a closed container at room temperature, away from heat, moisture, and direct light  Ask your pharmacist about the best way to dispose of medicine you do not use  Drugs and Foods to Avoid:   Ask your doctor or pharmacist before using any other medicine, including over-the-counter medicines, vitamins, and herbal products  · Do not use this medicine if you are using or have used an MAO inhibitor within the past 14 days  · Some medicines can affect how buprenorphine/naloxone works  Tell your doctor if you are using the following:   ¨ Carbamazepine, erythromycin, ketoconazole, mirtazapine, phenobarbital, phenytoin, rifampin, tramadol, or trazodone  ¨ Medicine to treat depression  ¨ Medicine to treat HIV/AIDS (including atazanavir, delavirdine, efavirenz, etravirine, nevirapine, ritonavir)  ¨ Phenothiazine medicine  · Do not drink alcohol while you are using this medicine  · Tell your doctor if you use anything else that makes you sleepy  Some examples are allergy medicine, narcotic pain medicine, and alcohol  Tell your doctor if you are also using butorphanol, nalbuphine, pentazocine, or a muscle relaxer    Warnings While Using This Medicine:   · Tell your doctor if you are pregnant or breastfeeding, or if you have kidney disease, liver disease (including hepatitis), lung or breathing problems, adrenal gland problems, an enlarged prostate, trouble urinating, gallbladder problems, low thyroid levels, stomach problems, or a history of depression, brain tumor, head injury, alcohol or drug abuse  · This medicine may cause the following problems:  ¨ High risk of overdose, which can lead to death  ¨ Respiratory depression (serious breathing problem that can be life-threatening)  ¨ Liver problems  ¨ Serotonin syndrome, when used with certain medicines  · This medicine may make you dizzy or drowsy  Do not drive or do anything that could be dangerous until you know how this medicine affects you  Stand or sit up slowly if you feel lightheaded or dizzy  · Tell any doctor or dentist who treats you that you are using this medicine  · This medicine can be habit-forming  Do not use more than your prescribed dose  Call your doctor if you think your medicine is not working  · Do not stop using this medicine suddenly  Your doctor will need to slowly decrease your dose before you stop it completely  · This medicine could cause infertility  Talk with your doctor before using this medicine if you plan to have children  · Keep all medicine out of the reach of children  Never share your medicine with anyone    Possible Side Effects While Using This Medicine:   Call your doctor right away if you notice any of these side effects:  · Allergic reaction: Itching or hives, swelling in your face or hands, swelling or tingling in your mouth or throat, chest tightness, trouble breathing  · Blue lips, fingernails, or skin  · Dark urine or pale stools, nausea, vomiting, loss of appetite, stomach pain, yellow skin or eyes  · Extreme dizziness or weakness, shallow breathing, sweating, seizures, cold or clammy skin  · Severe confusion, lightheadedness, dizziness, or fainting  · Trouble breathing or slow breathing  If you notice these less serious side effects, talk with your doctor:   · Headache, trouble sleeping  · Constipation or upset stomach  · Shaking, feeling hot or cold, runny nose, watery eyes, diarrhea, vomiting, and muscle aches  If you notice other side effects that you think are caused by this medicine, tell your doctor  Call your doctor for medical advice about side effects  You may report side effects to FDA at 7-831-WCC-4339  © 2017 [facility] Information is for End User's use only and may not be sold, redistributed or otherwise used for commercial purposes  The above information is an  only  It is not intended as medical advice for individual conditions or treatments  Talk to your doctor, nurse or pharmacist before following any medical regimen to see if it is safe and effective for you

## 2019-03-13 NOTE — PROGRESS NOTES
Assessment/Plan:    No problem-specific Assessment & Plan notes found for this encounter  Diagnoses and all orders for this visit:    Drug dependence ([address] )  -     buprenorphine-naloxone (SUBOXONE) 8-2 mg per SL tablet; One and half tabs sl q day  -     Suboxone  -     Toxicology screen, urine    Drug therapy continued  -     Suboxone  -     Toxicology screen, urine    Encounter for monitoring Suboxone maintenance therapy  -     buprenorphine-naloxone (SUBOXONE) 8-2 mg per SL tablet; One and half tabs sl q day  -     Suboxone  -     Toxicology screen, urine    Heroin abuse ([facility])  -     buprenorphine-naloxone (SUBOXONE) 8-2 mg per SL tablet; One and half tabs sl q day  -     Suboxone  -     Toxicology screen, urine    IV drug abuse ([facility])  -     buprenorphine-naloxone (SUBOXONE) 8-2 mg per SL tablet; One and half tabs sl q day  -     Suboxone  -     Toxicology screen, urine    Essential hypertension  -     cloNIDine (CATAPRES) 0 1 mg tablet; Take 1 tablet (0 1 mg total) by mouth 3 (three) times a day    Insomnia, unspecified type  -     zolpidem (AMBIEN) 10 mg tablet; Take 1 tablet (10 mg total) by mouth daily at bedtime as needed for sleep      A/P: Doing well and will continue with 12mg tabs, dose 6/6 and consider weaning next visit  Continue with current treatment and reminded to keep meds safe and out of reach of children  RTC four weeks  Forms filled out  Refills given for his other issues, but needs to make a separate appt for routine as well  Subjective:      Patient ID: Cruzito Godinez is a 20 y o  male  WM RTC for f/u suboxone  Doing well and no c/o's  Not using and no withdraw  Attends counseling  UDT obtained and sent to the lab  TOlerating the meds and no side effects  Also, pt needs forms filled out for the [employer] due to a seizure he had in 2011 after taking too many ultram  No seizures since and never had an EEG         The following portions of the patient's history were reviewed and updated as appropriate:   He  has a past medical history of Bipolar 2 disorder ([facility] ), MAURICE (generalized anxiety disorder) (10/10/2018), Heroin abuse ([place] [address] ) (10/10/2018), and Manic behavior ([place] [address] )  He   Patient Active Problem List    Diagnosis Date Noted    Drug dependence ([place] [address] ) 10/10/2018    Encounter for monitoring Suboxone maintenance therapy 10/10/2018    Drug therapy continued 10/10/2018    Heroin abuse ([place] [address] ) 10/10/2018    IV drug abuse ([place] [address] ) 10/10/2018    Migraine without aura and without status migrainosus, not intractable 10/10/2018    MAURICE (generalized anxiety disorder) 10/10/2018    Tobacco abuse 10/10/2018     He  has a past surgical history that includes Mandible fracture surgery and [place] tooth extraction  His family history includes Hepatitis in his father; Multiple sclerosis in his mother; Seizures in his mother  He  reports that he has been smoking  He has a 3 00 pack-year smoking history  His smokeless tobacco use includes chew  He reports that he has current or past drug history  Drugs: Amphetamines, Cocaine, Hydrocodone, Heroin, Marijuana, and Oxycodone  He reports that he does not drink alcohol  Current Outpatient Medications   Medication Sig Dispense Refill    buprenorphine-naloxone (SUBOXONE) 8-2 mg per SL tablet One and half tabs sl q day  45 tablet 0    cloNIDine (CATAPRES) 0 1 mg tablet Take 1 tablet (0 1 mg total) by mouth 3 (three) times a day 90 tablet 0    NARCAN 4 MG/0 1ML LIQD instill 1 spray in 1 NOSTRIL if needed for opioid overdose may re     (REFER TO PRESCRIPTION NOTES)  0    zolpidem (AMBIEN) 10 mg tablet Take 1 tablet (10 mg total) by mouth daily at bedtime as needed for sleep 30 tablet 0     No current facility-administered medications for this visit  Current Outpatient Medications on File Prior to Visit   Medication Sig    NARCAN 4 MG/0 1ML LIQD instill 1 spray in 1 NOSTRIL if needed for opioid overdose may re     (REFER TO PRESCRIPTION NOTES)      [DISCONTINUED] cloNIDine (CATAPRES) 0 1 mg tablet Take 1 tablet (0 1 mg total) by mouth 3 (three) times a day    [DISCONTINUED] zolpidem (AMBIEN) 10 mg tablet Take 1 tablet (10 mg total) by mouth daily at bedtime as needed for sleep    [DISCONTINUED] buprenorphine-naloxone (SUBOXONE) 8-2 mg per SL tablet One and half tabs sl q day  No current facility-administered medications on file prior to visit  He is allergic to topamax [topiramate]       Review of Systems   Constitutional: Negative for activity change, chills, diaphoresis, fatigue and fever  Respiratory: Negative for cough, chest tightness, shortness of breath and wheezing  Cardiovascular: Negative for chest pain, palpitations and leg swelling  Gastrointestinal: Negative for abdominal pain, constipation, diarrhea, nausea and vomiting  Genitourinary: Negative for difficulty urinating, dysuria and frequency  Musculoskeletal: Negative for arthralgias, gait problem and myalgias  Neurological: Negative for dizziness, seizures, weakness, light-headedness and headaches  Psychiatric/Behavioral: Negative for confusion, dysphoric mood, self-injury, sleep disturbance and suicidal ideas  The patient is not nervous/anxious  Objective:      /74   Pulse 78   Temp 98 1 °F (36 7 °C) (Tympanic)   Resp 14   Ht 5' 7" (1 702 m)   Wt 72 6 kg (160 lb)   BMI 25 06 kg/m²          Physical Exam   Constitutional: He is oriented to person, place, and time  He appears well-developed and well-nourished  No distress  HENT:   Head: Normocephalic and atraumatic  Mouth/Throat: Oropharynx is clear and moist    Eyes: Pupils are equal, round, and reactive to light  Conjunctivae and EOM are normal    Cardiovascular: Normal rate, regular rhythm and normal heart sounds  Pulmonary/Chest: Effort normal and breath sounds normal  No respiratory distress  He has no wheezes  He has no rales  Abdominal: Soft   Bowel sounds are normal  He exhibits no distension  There is no tenderness  Neurological: He is alert and oriented to person, place, and time  Psychiatric: He has a normal mood and affect  His behavior is normal  Judgment and thought content normal    Nursing note and vitals reviewed

## 2019-03-16 LAB
BUPRENORPHINE UR CFM-MCNC: 136 NG/ML
BUPRENORPHINE UR QL CFM: NORMAL NG/ML
BUPRENORPHINE UR QL CFM: POSITIVE
BUPRENORPHINE+NOR UR QL: POSITIVE
NORBUPRENORPHINE UR CFM-MCNC: 566 NG/ML
NORBUPRENORPHINE UR QL CFM: POSITIVE

## 2019-04-10 ENCOUNTER — OFFICE VISIT (OUTPATIENT)
Dept: INTERNAL MEDICINE CLINIC | Facility: CLINIC | Age: 24
End: 2019-04-10
Payer: COMMERCIAL

## 2019-04-10 VITALS
TEMPERATURE: 98 F | SYSTOLIC BLOOD PRESSURE: 124 MMHG | RESPIRATION RATE: 14 BRPM | DIASTOLIC BLOOD PRESSURE: 74 MMHG | HEART RATE: 82 BPM | WEIGHT: 155 LBS | HEIGHT: 67 IN | BODY MASS INDEX: 24.33 KG/M2

## 2019-04-10 DIAGNOSIS — F11.10 HEROIN ABUSE (HCC): ICD-10-CM

## 2019-04-10 DIAGNOSIS — G47.00 INSOMNIA, UNSPECIFIED TYPE: ICD-10-CM

## 2019-04-10 DIAGNOSIS — Z51.81 ENCOUNTER FOR MONITORING SUBOXONE MAINTENANCE THERAPY: ICD-10-CM

## 2019-04-10 DIAGNOSIS — I10 ESSENTIAL HYPERTENSION: ICD-10-CM

## 2019-04-10 DIAGNOSIS — Z79.899 DRUG THERAPY CONTINUED: ICD-10-CM

## 2019-04-10 DIAGNOSIS — Z79.899 ENCOUNTER FOR MONITORING SUBOXONE MAINTENANCE THERAPY: ICD-10-CM

## 2019-04-10 DIAGNOSIS — F19.20 DRUG DEPENDENCE (HCC): Primary | ICD-10-CM

## 2019-04-10 DIAGNOSIS — F19.10 IV DRUG ABUSE (HCC): ICD-10-CM

## 2019-04-10 PROCEDURE — 3078F DIAST BP <80 MM HG: CPT | Performed by: INTERNAL MEDICINE

## 2019-04-10 PROCEDURE — 80307 DRUG TEST PRSMV CHEM ANLYZR: CPT | Performed by: INTERNAL MEDICINE

## 2019-04-10 PROCEDURE — 99213 OFFICE O/P EST LOW 20 MIN: CPT | Performed by: INTERNAL MEDICINE

## 2019-04-10 PROCEDURE — 3074F SYST BP LT 130 MM HG: CPT | Performed by: INTERNAL MEDICINE

## 2019-04-10 RX ORDER — ZOLPIDEM TARTRATE 10 MG/1
10 TABLET ORAL
Qty: 30 TABLET | Refills: 0 | Status: SHIPPED | OUTPATIENT
Start: 2019-04-10 | End: 2019-05-10 | Stop reason: SDUPTHER

## 2019-04-10 RX ORDER — CLONIDINE HYDROCHLORIDE 0.1 MG/1
0.1 TABLET ORAL 3 TIMES DAILY
Qty: 90 TABLET | Refills: 0 | Status: SHIPPED | OUTPATIENT
Start: 2019-04-10 | End: 2019-05-10 | Stop reason: SDUPTHER

## 2019-04-10 RX ORDER — BUPRENORPHINE HYDROCHLORIDE AND NALOXONE HYDROCHLORIDE DIHYDRATE 8; 2 MG/1; MG/1
1 TABLET SUBLINGUAL DAILY
Qty: 30 TABLET | Refills: 0 | Status: SHIPPED | OUTPATIENT
Start: 2019-04-10 | End: 2019-05-10 | Stop reason: SDUPTHER

## 2019-04-13 LAB
BUPRENORPHINE UR CFM-MCNC: 68 NG/ML
BUPRENORPHINE UR QL CFM: NORMAL NG/ML
BUPRENORPHINE UR QL CFM: POSITIVE
BUPRENORPHINE+NOR UR QL: POSITIVE
NORBUPRENORPHINE UR CFM-MCNC: 400 NG/ML
NORBUPRENORPHINE UR QL CFM: POSITIVE

## 2019-04-15 ENCOUNTER — APPOINTMENT (OUTPATIENT)
Dept: LAB | Facility: CLINIC | Age: 24
End: 2019-04-15
Payer: COMMERCIAL

## 2019-04-15 ENCOUNTER — OFFICE VISIT (OUTPATIENT)
Dept: INTERNAL MEDICINE CLINIC | Facility: CLINIC | Age: 24
End: 2019-04-15
Payer: COMMERCIAL

## 2019-04-15 VITALS
OXYGEN SATURATION: 98 % | RESPIRATION RATE: 16 BRPM | SYSTOLIC BLOOD PRESSURE: 124 MMHG | DIASTOLIC BLOOD PRESSURE: 70 MMHG | HEART RATE: 78 BPM | WEIGHT: 155 LBS | BODY MASS INDEX: 24.33 KG/M2 | HEIGHT: 67 IN | TEMPERATURE: 98 F

## 2019-04-15 DIAGNOSIS — F19.20 DRUG DEPENDENCE (HCC): Primary | ICD-10-CM

## 2019-04-15 DIAGNOSIS — Z13.29 SCREENING FOR THYROID DISORDER: ICD-10-CM

## 2019-04-15 DIAGNOSIS — F41.1 GAD (GENERALIZED ANXIETY DISORDER): ICD-10-CM

## 2019-04-15 DIAGNOSIS — Z72.0 TOBACCO ABUSE: ICD-10-CM

## 2019-04-15 DIAGNOSIS — Z13.0 SCREENING FOR DEFICIENCY ANEMIA: ICD-10-CM

## 2019-04-15 DIAGNOSIS — G43.009 MIGRAINE WITHOUT AURA AND WITHOUT STATUS MIGRAINOSUS, NOT INTRACTABLE: ICD-10-CM

## 2019-04-15 DIAGNOSIS — G47.9 SLEEP DISTURBANCE: ICD-10-CM

## 2019-04-15 DIAGNOSIS — Z13.1 SCREENING FOR DIABETES MELLITUS (DM): ICD-10-CM

## 2019-04-15 DIAGNOSIS — Z13.220 SCREENING FOR LIPID DISORDERS: ICD-10-CM

## 2019-04-15 DIAGNOSIS — Z23 ENCOUNTER FOR VACCINATION: ICD-10-CM

## 2019-04-15 DIAGNOSIS — F19.20 DRUG DEPENDENCE (HCC): ICD-10-CM

## 2019-04-15 DIAGNOSIS — Z78.9 ELECTRONIC CIGARETTE USE: ICD-10-CM

## 2019-04-15 LAB
ALBUMIN SERPL BCP-MCNC: 4.2 G/DL (ref 3.5–5)
ALP SERPL-CCNC: 70 U/L (ref 46–116)
ALT SERPL W P-5'-P-CCNC: 97 U/L (ref 12–78)
ANION GAP SERPL CALCULATED.3IONS-SCNC: 1 MMOL/L (ref 4–13)
AST SERPL W P-5'-P-CCNC: 32 U/L (ref 5–45)
BASOPHILS # BLD AUTO: 0.07 THOUSANDS/ΜL (ref 0–0.1)
BASOPHILS NFR BLD AUTO: 1 % (ref 0–1)
BILIRUB SERPL-MCNC: 0.61 MG/DL (ref 0.2–1)
BUN SERPL-MCNC: 12 MG/DL (ref 5–25)
CALCIUM SERPL-MCNC: 8.5 MG/DL (ref 8.3–10.1)
CHLORIDE SERPL-SCNC: 106 MMOL/L (ref 100–108)
CHOLEST SERPL-MCNC: 115 MG/DL (ref 50–200)
CO2 SERPL-SCNC: 29 MMOL/L (ref 21–32)
CREAT SERPL-MCNC: 0.99 MG/DL (ref 0.6–1.3)
EOSINOPHIL # BLD AUTO: 0.23 THOUSAND/ΜL (ref 0–0.61)
EOSINOPHIL NFR BLD AUTO: 5 % (ref 0–6)
ERYTHROCYTE [DISTWIDTH] IN BLOOD BY AUTOMATED COUNT: 12.3 % (ref 11.6–15.1)
EST. AVERAGE GLUCOSE BLD GHB EST-MCNC: 100 MG/DL
GFR SERPL CREATININE-BSD FRML MDRD: 105 ML/MIN/1.73SQ M
GLUCOSE P FAST SERPL-MCNC: 106 MG/DL (ref 65–99)
HBA1C MFR BLD: 5.1 % (ref 4.2–6.3)
HCT VFR BLD AUTO: 41.9 % (ref 36.5–49.3)
HDLC SERPL-MCNC: 51 MG/DL (ref 40–60)
HGB BLD-MCNC: 14.5 G/DL (ref 12–17)
IMM GRANULOCYTES # BLD AUTO: 0.01 THOUSAND/UL (ref 0–0.2)
IMM GRANULOCYTES NFR BLD AUTO: 0 % (ref 0–2)
LDLC SERPL CALC-MCNC: 54 MG/DL (ref 0–100)
LYMPHOCYTES # BLD AUTO: 2.01 THOUSANDS/ΜL (ref 0.6–4.47)
LYMPHOCYTES NFR BLD AUTO: 39 % (ref 14–44)
MCH RBC QN AUTO: 29.5 PG (ref 26.8–34.3)
MCHC RBC AUTO-ENTMCNC: 34.6 G/DL (ref 31.4–37.4)
MCV RBC AUTO: 85 FL (ref 82–98)
MONOCYTES # BLD AUTO: 0.5 THOUSAND/ΜL (ref 0.17–1.22)
MONOCYTES NFR BLD AUTO: 10 % (ref 4–12)
NEUTROPHILS # BLD AUTO: 2.32 THOUSANDS/ΜL (ref 1.85–7.62)
NEUTS SEG NFR BLD AUTO: 45 % (ref 43–75)
NRBC BLD AUTO-RTO: 0 /100 WBCS
PLATELET # BLD AUTO: 221 THOUSANDS/UL (ref 149–390)
PMV BLD AUTO: 11.2 FL (ref 8.9–12.7)
POTASSIUM SERPL-SCNC: 3.9 MMOL/L (ref 3.5–5.3)
PROT SERPL-MCNC: 7.6 G/DL (ref 6.4–8.2)
RBC # BLD AUTO: 4.92 MILLION/UL (ref 3.88–5.62)
SODIUM SERPL-SCNC: 136 MMOL/L (ref 136–145)
TRIGL SERPL-MCNC: 48 MG/DL
TSH SERPL DL<=0.05 MIU/L-ACNC: 1.38 UIU/ML (ref 0.36–3.74)
WBC # BLD AUTO: 5.14 THOUSAND/UL (ref 4.31–10.16)

## 2019-04-15 PROCEDURE — 90471 IMMUNIZATION ADMIN: CPT | Performed by: INTERNAL MEDICINE

## 2019-04-15 PROCEDURE — 80061 LIPID PANEL: CPT

## 2019-04-15 PROCEDURE — 80053 COMPREHEN METABOLIC PANEL: CPT

## 2019-04-15 PROCEDURE — 85025 COMPLETE CBC W/AUTO DIFF WBC: CPT

## 2019-04-15 PROCEDURE — 84443 ASSAY THYROID STIM HORMONE: CPT

## 2019-04-15 PROCEDURE — 36415 COLL VENOUS BLD VENIPUNCTURE: CPT

## 2019-04-15 PROCEDURE — 83036 HEMOGLOBIN GLYCOSYLATED A1C: CPT

## 2019-04-15 PROCEDURE — 99214 OFFICE O/P EST MOD 30 MIN: CPT | Performed by: INTERNAL MEDICINE

## 2019-04-15 PROCEDURE — 90715 TDAP VACCINE 7 YRS/> IM: CPT | Performed by: INTERNAL MEDICINE

## 2019-04-15 PROCEDURE — 3008F BODY MASS INDEX DOCD: CPT | Performed by: INTERNAL MEDICINE

## 2019-05-10 ENCOUNTER — OFFICE VISIT (OUTPATIENT)
Dept: INTERNAL MEDICINE CLINIC | Facility: CLINIC | Age: 24
End: 2019-05-10
Payer: COMMERCIAL

## 2019-05-10 VITALS
HEART RATE: 101 BPM | BODY MASS INDEX: 23.86 KG/M2 | RESPIRATION RATE: 18 BRPM | OXYGEN SATURATION: 98 % | WEIGHT: 152 LBS | SYSTOLIC BLOOD PRESSURE: 122 MMHG | TEMPERATURE: 98.1 F | DIASTOLIC BLOOD PRESSURE: 82 MMHG | HEIGHT: 67 IN

## 2019-05-10 DIAGNOSIS — K08.89 PAIN, DENTAL: ICD-10-CM

## 2019-05-10 DIAGNOSIS — Z51.81 ENCOUNTER FOR MONITORING SUBOXONE MAINTENANCE THERAPY: ICD-10-CM

## 2019-05-10 DIAGNOSIS — Z79.899 DRUG THERAPY CONTINUED: ICD-10-CM

## 2019-05-10 DIAGNOSIS — G47.00 INSOMNIA, UNSPECIFIED TYPE: ICD-10-CM

## 2019-05-10 DIAGNOSIS — Z79.899 ENCOUNTER FOR MONITORING SUBOXONE MAINTENANCE THERAPY: ICD-10-CM

## 2019-05-10 DIAGNOSIS — F19.20 DRUG DEPENDENCE (HCC): Primary | ICD-10-CM

## 2019-05-10 DIAGNOSIS — F11.10 HEROIN ABUSE (HCC): ICD-10-CM

## 2019-05-10 DIAGNOSIS — I10 ESSENTIAL HYPERTENSION: ICD-10-CM

## 2019-05-10 DIAGNOSIS — F19.10 IV DRUG ABUSE (HCC): ICD-10-CM

## 2019-05-10 PROCEDURE — 99213 OFFICE O/P EST LOW 20 MIN: CPT | Performed by: INTERNAL MEDICINE

## 2019-05-10 PROCEDURE — 80307 DRUG TEST PRSMV CHEM ANLYZR: CPT | Performed by: INTERNAL MEDICINE

## 2019-05-10 RX ORDER — ZOLPIDEM TARTRATE 10 MG/1
10 TABLET ORAL
Qty: 30 TABLET | Refills: 0 | Status: SHIPPED | OUTPATIENT
Start: 2019-05-10 | End: 2019-06-07 | Stop reason: SDUPTHER

## 2019-05-10 RX ORDER — PENICILLIN V POTASSIUM 500 MG/1
500 TABLET ORAL EVERY 6 HOURS SCHEDULED
COMMUNITY
End: 2019-06-03

## 2019-05-10 RX ORDER — BUPRENORPHINE HYDROCHLORIDE AND NALOXONE HYDROCHLORIDE DIHYDRATE 8; 2 MG/1; MG/1
1 TABLET SUBLINGUAL DAILY
Qty: 30 TABLET | Refills: 0 | Status: SHIPPED | OUTPATIENT
Start: 2019-05-10 | End: 2019-06-03 | Stop reason: SDUPTHER

## 2019-05-10 RX ORDER — CLINDAMYCIN HYDROCHLORIDE 150 MG/1
300 CAPSULE ORAL EVERY 8 HOURS SCHEDULED
Qty: 60 CAPSULE | Refills: 0 | Status: SHIPPED | OUTPATIENT
Start: 2019-05-10 | End: 2019-05-20

## 2019-05-10 RX ORDER — CLONIDINE HYDROCHLORIDE 0.1 MG/1
0.1 TABLET ORAL 3 TIMES DAILY
Qty: 90 TABLET | Refills: 5 | Status: SHIPPED | OUTPATIENT
Start: 2019-05-10 | End: 2019-06-07 | Stop reason: SDUPTHER

## 2019-05-16 LAB
BUPRENORPHINE UR CFM-MCNC: 90 NG/ML
BUPRENORPHINE UR QL CFM: NORMAL NG/ML
BUPRENORPHINE UR QL CFM: POSITIVE
BUPRENORPHINE+NOR UR QL: POSITIVE
NORBUPRENORPHINE UR CFM-MCNC: 208 NG/ML
NORBUPRENORPHINE UR QL CFM: POSITIVE

## 2019-06-03 ENCOUNTER — OFFICE VISIT (OUTPATIENT)
Dept: INTERNAL MEDICINE CLINIC | Facility: CLINIC | Age: 24
End: 2019-06-03
Payer: COMMERCIAL

## 2019-06-03 VITALS
WEIGHT: 147 LBS | HEART RATE: 78 BPM | BODY MASS INDEX: 23.07 KG/M2 | RESPIRATION RATE: 14 BRPM | DIASTOLIC BLOOD PRESSURE: 70 MMHG | HEIGHT: 67 IN | TEMPERATURE: 98.1 F | SYSTOLIC BLOOD PRESSURE: 124 MMHG

## 2019-06-03 DIAGNOSIS — Z51.81 ENCOUNTER FOR MONITORING SUBOXONE MAINTENANCE THERAPY: ICD-10-CM

## 2019-06-03 DIAGNOSIS — F19.20 DRUG DEPENDENCE (HCC): Primary | ICD-10-CM

## 2019-06-03 DIAGNOSIS — Z79.899 ENCOUNTER FOR MONITORING SUBOXONE MAINTENANCE THERAPY: ICD-10-CM

## 2019-06-03 DIAGNOSIS — F11.10 HEROIN ABUSE (HCC): ICD-10-CM

## 2019-06-03 DIAGNOSIS — F19.10 IV DRUG ABUSE (HCC): ICD-10-CM

## 2019-06-03 DIAGNOSIS — Z79.899 DRUG THERAPY CONTINUED: ICD-10-CM

## 2019-06-03 PROCEDURE — 99213 OFFICE O/P EST LOW 20 MIN: CPT | Performed by: INTERNAL MEDICINE

## 2019-06-03 PROCEDURE — 80307 DRUG TEST PRSMV CHEM ANLYZR: CPT | Performed by: INTERNAL MEDICINE

## 2019-06-03 RX ORDER — BUPRENORPHINE HYDROCHLORIDE AND NALOXONE HYDROCHLORIDE DIHYDRATE 8; 2 MG/1; MG/1
1 TABLET SUBLINGUAL DAILY
Qty: 30 TABLET | Refills: 0 | Status: SHIPPED | OUTPATIENT
Start: 2019-06-03 | End: 2019-07-03 | Stop reason: SDUPTHER

## 2019-06-07 DIAGNOSIS — G47.00 INSOMNIA, UNSPECIFIED TYPE: ICD-10-CM

## 2019-06-07 DIAGNOSIS — I10 ESSENTIAL HYPERTENSION: ICD-10-CM

## 2019-06-07 LAB
BUPRENORPHINE UR CFM-MCNC: 344 NG/ML
BUPRENORPHINE UR QL CFM: NORMAL NG/ML
BUPRENORPHINE UR QL CFM: POSITIVE
BUPRENORPHINE+NOR UR QL: POSITIVE
NORBUPRENORPHINE UR CFM-MCNC: 384 NG/ML
NORBUPRENORPHINE UR QL CFM: POSITIVE

## 2019-06-07 RX ORDER — ZOLPIDEM TARTRATE 10 MG/1
10 TABLET ORAL
Qty: 30 TABLET | Refills: 0 | Status: SHIPPED | OUTPATIENT
Start: 2019-06-07 | End: 2019-07-09 | Stop reason: SDUPTHER

## 2019-06-07 RX ORDER — CLONIDINE HYDROCHLORIDE 0.1 MG/1
0.1 TABLET ORAL 3 TIMES DAILY
Qty: 90 TABLET | Refills: 5 | Status: SHIPPED | OUTPATIENT
Start: 2019-06-07 | End: 2020-01-04 | Stop reason: SDUPTHER

## 2019-07-03 ENCOUNTER — OFFICE VISIT (OUTPATIENT)
Dept: INTERNAL MEDICINE CLINIC | Facility: CLINIC | Age: 24
End: 2019-07-03
Payer: COMMERCIAL

## 2019-07-03 VITALS
BODY MASS INDEX: 22.79 KG/M2 | SYSTOLIC BLOOD PRESSURE: 118 MMHG | HEART RATE: 68 BPM | TEMPERATURE: 97.2 F | DIASTOLIC BLOOD PRESSURE: 70 MMHG | HEIGHT: 67 IN | WEIGHT: 145.2 LBS | RESPIRATION RATE: 16 BRPM | OXYGEN SATURATION: 99 %

## 2019-07-03 DIAGNOSIS — Z79.899 DRUG THERAPY CONTINUED: ICD-10-CM

## 2019-07-03 DIAGNOSIS — F19.10 IV DRUG ABUSE (HCC): ICD-10-CM

## 2019-07-03 DIAGNOSIS — F19.20 DRUG DEPENDENCE (HCC): Primary | ICD-10-CM

## 2019-07-03 DIAGNOSIS — Z79.899 ENCOUNTER FOR MONITORING SUBOXONE MAINTENANCE THERAPY: ICD-10-CM

## 2019-07-03 DIAGNOSIS — F11.10 HEROIN ABUSE (HCC): ICD-10-CM

## 2019-07-03 DIAGNOSIS — Z51.81 ENCOUNTER FOR MONITORING SUBOXONE MAINTENANCE THERAPY: ICD-10-CM

## 2019-07-03 PROCEDURE — 99213 OFFICE O/P EST LOW 20 MIN: CPT | Performed by: INTERNAL MEDICINE

## 2019-07-03 PROCEDURE — 80307 DRUG TEST PRSMV CHEM ANLYZR: CPT | Performed by: INTERNAL MEDICINE

## 2019-07-03 PROCEDURE — 3008F BODY MASS INDEX DOCD: CPT | Performed by: INTERNAL MEDICINE

## 2019-07-03 RX ORDER — BUPRENORPHINE HYDROCHLORIDE AND NALOXONE HYDROCHLORIDE DIHYDRATE 8; 2 MG/1; MG/1
1 TABLET SUBLINGUAL DAILY
Qty: 30 TABLET | Refills: 0 | Status: SHIPPED | OUTPATIENT
Start: 2019-07-03 | End: 2019-07-31 | Stop reason: SDUPTHER

## 2019-07-03 NOTE — PATIENT INSTRUCTIONS
Buprenorphine/Naloxone (Into the mouth)   Buprenorphine (bue-pre-NOR-feen), Naloxone (nal-OX-one)  Treats narcotic dependence  Brand Name(s): Bunavail, Suboxone, Zubsolv   There may be other brand names for this medicine  When This Medicine Should Not Be Used: This medicine is not right for everyone  Do not use it if you had an allergic reaction to buprenorphine or naloxone  How to Use This Medicine: Thin Sheet, Tablet  · Take your medicine as directed  Your dose may need to be changed several times to find what works best for you  · You must let the medicine dissolve  Never swallow the film or tablet  Your body may not absorb enough of the medicine if you swallow it  · Your health caregiver should show you how to use the medicine  If you do not understand, ask for help  It is important to use the medicine correctly  · Do not talk while the medicine is in your mouth  · Buccal film: Rinse your mouth with water to moisten it  Place the film against the inside of your cheek  If your doctor told you to use more than 1 film, place the second film inside your other cheek  Do not place more than 2 films inside of 1 cheek at a time  Do not move or touch the film  Do not eat or drink anything until the film is completely dissolved  · Sublingual tablet: Place the tablet under your tongue  If your doctor told you to use more than 1 tablet, place all of the tablets in different places under your tongue at the same time  You can use 2 tablets at a time until you have taken all of the medicine, if that is easier for you  Let the tablets dissolve completely in your mouth  Do not eat or drink anything until the tablets are completely dissolved  · Sublingual film: Drink some water to help moisten your mouth  Place the film under your tongue  If your doctor told you to use more than 1 film, place the second film on the opposite side from the first one  Do not move the film after you place it under your tongue   If you are supposed to use more than 2 films, use them the same way, but do not start until the first 2 films are completely dissolved  · Do not break, crush, chew, or cut the film or tablet  · This medicine should come with a Medication Guide  Ask your pharmacist for a copy if you do not have one  · Missed dose: Take a dose as soon as you remember  If it is almost time for your next dose, wait until then and take a regular dose  Do not take extra medicine to make up for a missed dose  · Store the medicine in a closed container at room temperature, away from heat, moisture, and direct light  Ask your pharmacist about the best way to dispose of medicine you do not use  Drugs and Foods to Avoid:   Ask your doctor or pharmacist before using any other medicine, including over-the-counter medicines, vitamins, and herbal products  · Do not use this medicine if you are using or have used an MAO inhibitor within the past 14 days  · Some medicines can affect how buprenorphine/naloxone works  Tell your doctor if you are using the following:   ¨ Carbamazepine, erythromycin, ketoconazole, mirtazapine, phenobarbital, phenytoin, rifampin, tramadol, or trazodone  ¨ Medicine to treat depression  ¨ Medicine to treat HIV/AIDS (including atazanavir, delavirdine, efavirenz, etravirine, nevirapine, ritonavir)  ¨ Phenothiazine medicine  · Do not drink alcohol while you are using this medicine  · Tell your doctor if you use anything else that makes you sleepy  Some examples are allergy medicine, narcotic pain medicine, and alcohol  Tell your doctor if you are also using butorphanol, nalbuphine, pentazocine, or a muscle relaxer    Warnings While Using This Medicine:   · Tell your doctor if you are pregnant or breastfeeding, or if you have kidney disease, liver disease (including hepatitis), lung or breathing problems, adrenal gland problems, an enlarged prostate, trouble urinating, gallbladder problems, low thyroid levels, stomach problems, or a history of depression, brain tumor, head injury, alcohol or drug abuse  · This medicine may cause the following problems:  ¨ High risk of overdose, which can lead to death  ¨ Respiratory depression (serious breathing problem that can be life-threatening)  ¨ Liver problems  ¨ Serotonin syndrome, when used with certain medicines  · This medicine may make you dizzy or drowsy  Do not drive or do anything that could be dangerous until you know how this medicine affects you  Stand or sit up slowly if you feel lightheaded or dizzy  · Tell any doctor or dentist who treats you that you are using this medicine  · This medicine can be habit-forming  Do not use more than your prescribed dose  Call your doctor if you think your medicine is not working  · Do not stop using this medicine suddenly  Your doctor will need to slowly decrease your dose before you stop it completely  · This medicine could cause infertility  Talk with your doctor before using this medicine if you plan to have children  · Keep all medicine out of the reach of children  Never share your medicine with anyone    Possible Side Effects While Using This Medicine:   Call your doctor right away if you notice any of these side effects:  · Allergic reaction: Itching or hives, swelling in your face or hands, swelling or tingling in your mouth or throat, chest tightness, trouble breathing  · Blue lips, fingernails, or skin  · Dark urine or pale stools, nausea, vomiting, loss of appetite, stomach pain, yellow skin or eyes  · Extreme dizziness or weakness, shallow breathing, sweating, seizures, cold or clammy skin  · Severe confusion, lightheadedness, dizziness, or fainting  · Trouble breathing or slow breathing  If you notice these less serious side effects, talk with your doctor:   · Headache, trouble sleeping  · Constipation or upset stomach  · Shaking, feeling hot or cold, runny nose, watery eyes, diarrhea, vomiting, and muscle aches  If you notice other side effects that you think are caused by this medicine, tell your doctor  Call your doctor for medical advice about side effects  You may report side effects to FDA at 3-254-GIM-3032  © 2017 [address] [employer] Information is for End User's use only and may not be sold, redistributed or otherwise used for commercial purposes  The above information is an  only  It is not intended as medical advice for individual conditions or treatments  Talk to your doctor, nurse or pharmacist before following any medical regimen to see if it is safe and effective for you

## 2019-07-03 NOTE — PROGRESS NOTES
Assessment/Plan:  Problem List Items Addressed This Visit        Other    Drug dependence ([place] [address] ) - Primary    Relevant Medications    buprenorphine-naloxone (SUBOXONE) 8-2 mg per SL tablet    Other Relevant Orders    Toxicology screen, urine    Suboxone    Encounter for monitoring Suboxone maintenance therapy    Relevant Medications    buprenorphine-naloxone (SUBOXONE) 8-2 mg per SL tablet    Drug therapy continued    Relevant Medications    buprenorphine-naloxone (SUBOXONE) 8-2 mg per SL tablet    Other Relevant Orders    Toxicology screen, urine    Suboxone    Heroin abuse ([facility] )    Relevant Medications    buprenorphine-naloxone (SUBOXONE) 8-2 mg per SL tablet    IV drug abuse ([facility] )    Relevant Medications    buprenorphine-naloxone (SUBOXONE) 8-2 mg per SL tablet           Diagnoses and all orders for this visit:    Drug dependence ([place] [address] )  -     Toxicology screen, urine  -     Suboxone  -     buprenorphine-naloxone (SUBOXONE) 8-2 mg per SL tablet; Place 1 tablet under the tongue daily    Drug therapy continued  -     Toxicology screen, urine  -     Suboxone  -     buprenorphine-naloxone (SUBOXONE) 8-2 mg per SL tablet; Place 1 tablet under the tongue daily    Encounter for monitoring Suboxone maintenance therapy  -     buprenorphine-naloxone (SUBOXONE) 8-2 mg per SL tablet; Place 1 tablet under the tongue daily    Heroin abuse ([facility] )  -     buprenorphine-naloxone (SUBOXONE) 8-2 mg per SL tablet; Place 1 tablet under the tongue daily    IV drug abuse ([facility] )  -     buprenorphine-naloxone (SUBOXONE) 8-2 mg per SL tablet; Place 1 tablet under the tongue daily        No problem-specific Assessment & Plan notes found for this encounter  A/P: Doing well and will continue 8mg tabs, dose 4/6 and get into counseling and continue with [facility]  Reminded to keep meds safe and out of reach of children  RTC 4 weeks  Subjective:      Patient ID: Gokul Tarango is a 30 y o  male  WF presents for f/u suboxone   Doing well and no c/o's  Not using and no withdraw  Does not attend counseling, but attends [facility]  UDT done today  Tolerating the meds and no side effects  The following portions of the patient's history were reviewed and updated as appropriate:   He has a past medical history of Bipolar 2 disorder ([facility] ), MAURICE (generalized anxiety disorder) (10/10/2018), Heroin abuse ([facility] ) (10/10/2018), and Manic behavior ([facility] )  ,  does not have any pertinent problems on file  ,   has a past surgical history that includes Mandible fracture surgery and [place] tooth extraction  ,  family history includes Hepatitis in his father; Multiple sclerosis in his mother; Seizures in his mother  ,   reports that he has been smoking  He has a 3 00 pack-year smoking history  He has quit using smokeless tobacco   His smokeless tobacco use included chew  He reports that he has current or past drug history  Drugs: Amphetamines, Cocaine, Hydrocodone, Heroin, Marijuana, and Oxycodone  He reports that he does not drink alcohol ,  is allergic to topamax [topiramate]     Current Outpatient Medications   Medication Sig Dispense Refill    buprenorphine-naloxone (SUBOXONE) 8-2 mg per SL tablet Place 1 tablet under the tongue daily 30 tablet 0    cloNIDine (CATAPRES) 0 1 mg tablet Take 1 tablet (0 1 mg total) by mouth 3 (three) times a day 90 tablet 5    NARCAN 4 MG/0 1ML LIQD instill 1 spray in 1 NOSTRIL if needed for opioid overdose may re     (REFER TO PRESCRIPTION NOTES)  0    zolpidem (AMBIEN) 10 mg tablet Take 1 tablet (10 mg total) by mouth daily at bedtime as needed for sleep 30 tablet 0     No current facility-administered medications for this visit  Review of Systems   Constitutional: Negative for activity change, chills, diaphoresis, fatigue and fever  HENT: Negative  Eyes: Negative for visual disturbance  Respiratory: Negative for cough, chest tightness, shortness of breath and wheezing      Cardiovascular: Negative for chest pain, palpitations and leg swelling  Gastrointestinal: Negative for abdominal pain, constipation, diarrhea, nausea and vomiting  Genitourinary: Negative for difficulty urinating, dysuria and frequency  Musculoskeletal: Negative for arthralgias, gait problem and myalgias  Neurological: Negative for dizziness, seizures, syncope, weakness, light-headedness and headaches  Psychiatric/Behavioral: Negative for confusion, dysphoric mood, self-injury, sleep disturbance and suicidal ideas  The patient is not nervous/anxious  Objective:  Vitals:    07/03/19 0853   BP: 118/70   BP Location: Left arm   Patient Position: Sitting   Cuff Size: Standard   Pulse: 68   Resp: 16   Temp: (!) 97 2 °F (36 2 °C)   TempSrc: Tympanic   SpO2: 99%   Weight: 65 9 kg (145 lb 3 2 oz)   Height: 5' 7" (1 702 m)     Body mass index is 22 74 kg/m²  Physical Exam   Constitutional: He is oriented to person, place, and time  He appears well-developed and well-nourished  No distress  HENT:   Head: Normocephalic and atraumatic  Mouth/Throat: Oropharynx is clear and moist    Eyes: Pupils are equal, round, and reactive to light  Conjunctivae and EOM are normal    Cardiovascular: Normal rate, regular rhythm and normal heart sounds  Pulmonary/Chest: Effort normal and breath sounds normal  No respiratory distress  He has no wheezes  He has no rales  Abdominal: Soft  Bowel sounds are normal  He exhibits no distension  There is no tenderness  Neurological: He is alert and oriented to person, place, and time  Psychiatric: He has a normal mood and affect  His behavior is normal  Judgment and thought content normal    Nursing note and vitals reviewed

## 2019-07-09 DIAGNOSIS — G47.00 INSOMNIA, UNSPECIFIED TYPE: ICD-10-CM

## 2019-07-09 RX ORDER — ZOLPIDEM TARTRATE 10 MG/1
10 TABLET ORAL
Qty: 30 TABLET | Refills: 0 | Status: SHIPPED | OUTPATIENT
Start: 2019-07-09 | End: 2019-08-15 | Stop reason: SDUPTHER

## 2019-07-11 LAB
BUPRENORPHINE UR CFM-MCNC: 96 NG/ML
BUPRENORPHINE UR QL CFM: NORMAL NG/ML
BUPRENORPHINE UR QL CFM: POSITIVE
BUPRENORPHINE+NOR UR QL: POSITIVE
NORBUPRENORPHINE UR CFM-MCNC: 408 NG/ML
NORBUPRENORPHINE UR QL CFM: POSITIVE

## 2019-07-31 ENCOUNTER — OFFICE VISIT (OUTPATIENT)
Dept: INTERNAL MEDICINE CLINIC | Facility: CLINIC | Age: 24
End: 2019-07-31
Payer: COMMERCIAL

## 2019-07-31 VITALS
SYSTOLIC BLOOD PRESSURE: 118 MMHG | DIASTOLIC BLOOD PRESSURE: 80 MMHG | TEMPERATURE: 97.5 F | BODY MASS INDEX: 22.76 KG/M2 | HEART RATE: 88 BPM | RESPIRATION RATE: 18 BRPM | OXYGEN SATURATION: 98 % | WEIGHT: 145 LBS | HEIGHT: 67 IN

## 2019-07-31 DIAGNOSIS — Z51.81 ENCOUNTER FOR MONITORING SUBOXONE MAINTENANCE THERAPY: ICD-10-CM

## 2019-07-31 DIAGNOSIS — F11.10 HEROIN ABUSE (HCC): ICD-10-CM

## 2019-07-31 DIAGNOSIS — F19.20 DRUG DEPENDENCE (HCC): Primary | ICD-10-CM

## 2019-07-31 DIAGNOSIS — F19.10 IV DRUG ABUSE (HCC): ICD-10-CM

## 2019-07-31 DIAGNOSIS — Z79.899 ENCOUNTER FOR MONITORING SUBOXONE MAINTENANCE THERAPY: ICD-10-CM

## 2019-07-31 DIAGNOSIS — Z79.899 DRUG THERAPY CONTINUED: ICD-10-CM

## 2019-07-31 PROCEDURE — 99213 OFFICE O/P EST LOW 20 MIN: CPT | Performed by: INTERNAL MEDICINE

## 2019-07-31 RX ORDER — BUPRENORPHINE HYDROCHLORIDE AND NALOXONE HYDROCHLORIDE DIHYDRATE 8; 2 MG/1; MG/1
1 TABLET SUBLINGUAL DAILY
Qty: 30 TABLET | Refills: 0 | Status: SHIPPED | OUTPATIENT
Start: 2019-07-31 | End: 2019-08-28 | Stop reason: SDUPTHER

## 2019-07-31 NOTE — PROGRESS NOTES
Assessment/Plan:  Problem List Items Addressed This Visit        Other    Drug dependence ([facility] ) - Primary    Relevant Medications    buprenorphine-naloxone (SUBOXONE) 8-2 mg per SL tablet    Encounter for monitoring Suboxone maintenance therapy    Relevant Medications    buprenorphine-naloxone (SUBOXONE) 8-2 mg per SL tablet    Other Relevant Orders    Suboxone    Toxicology screen, urine    Drug therapy continued    Relevant Medications    buprenorphine-naloxone (SUBOXONE) 8-2 mg per SL tablet    Heroin abuse ([facility])    Relevant Medications    buprenorphine-naloxone (SUBOXONE) 8-2 mg per SL tablet    IV drug abuse ([facility] )    Relevant Medications    buprenorphine-naloxone (SUBOXONE) 8-2 mg per SL tablet           Diagnoses and all orders for this visit:    Drug dependence ([facility] )  -     buprenorphine-naloxone (SUBOXONE) 8-2 mg per SL tablet; Place 1 tablet under the tongue daily    Encounter for monitoring Suboxone maintenance therapy  -     Suboxone  -     Toxicology screen, urine  -     buprenorphine-naloxone (SUBOXONE) 8-2 mg per SL tablet; Place 1 tablet under the tongue daily    Drug therapy continued  -     buprenorphine-naloxone (SUBOXONE) 8-2 mg per SL tablet; Place 1 tablet under the tongue daily    Heroin abuse ([facility])  -     buprenorphine-naloxone (SUBOXONE) 8-2 mg per SL tablet; Place 1 tablet under the tongue daily    IV drug abuse ([facility])  -     buprenorphine-naloxone (SUBOXONE) 8-2 mg per SL tablet; Place 1 tablet under the tongue daily        No problem-specific Assessment & Plan notes found for this encounter  A/P: Doing well and will continue 8mg tabs, dose 5/6 and get into counseling  Reminded to keep meds safe and out of reach of children  RTC 4 weeks  Subjective:      Patient ID: Kellie Magaña is a 30 y o  male  WM presents for f/u suboxone  Doing well and no c/o's  Not using and no withdraw  Does not attend counseling, but goes to [facility]  UDT obtained today   Tolerating the meds and no side effects  The following portions of the patient's history were reviewed and updated as appropriate:   He has a past medical history of Bipolar 2 disorder ([facility] ), MAURICE (generalized anxiety disorder) (10/10/2018), Heroin abuse ([facility] ) (10/10/2018), and Manic behavior ([facility] )  ,  does not have any pertinent problems on file  ,   has a past surgical history that includes Mandible fracture surgery and [place] tooth extraction  ,  family history includes Hepatitis in his father; Multiple sclerosis in his mother; Seizures in his mother  ,   reports that he has quit smoking  He has a 3 00 pack-year smoking history  He has quit using smokeless tobacco   His smokeless tobacco use included chew  He reports that he has current or past drug history  Drugs: Amphetamines, Cocaine, Hydrocodone, Heroin, Marijuana, and Oxycodone  He reports that he does not drink alcohol ,  is allergic to topamax [topiramate]     Current Outpatient Medications   Medication Sig Dispense Refill    buprenorphine-naloxone (SUBOXONE) 8-2 mg per SL tablet Place 1 tablet under the tongue daily 30 tablet 0    cloNIDine (CATAPRES) 0 1 mg tablet Take 1 tablet (0 1 mg total) by mouth 3 (three) times a day 90 tablet 5    NARCAN 4 MG/0 1ML LIQD instill 1 spray in 1 NOSTRIL if needed for opioid overdose may re     (REFER TO PRESCRIPTION NOTES)  0    zolpidem (AMBIEN) 10 mg tablet Take 1 tablet (10 mg total) by mouth daily at bedtime as needed for sleep 30 tablet 0     No current facility-administered medications for this visit  Review of Systems   Constitutional: Negative for activity change, chills, diaphoresis, fatigue and fever  Respiratory: Negative for cough, chest tightness, shortness of breath and wheezing  Cardiovascular: Negative for chest pain, palpitations and leg swelling  Gastrointestinal: Negative for abdominal pain, constipation, diarrhea, nausea and vomiting  Genitourinary: Negative for difficulty urinating, dysuria and frequency  Musculoskeletal: Negative for arthralgias, gait problem and myalgias  Neurological: Negative for dizziness, seizures, syncope, weakness, light-headedness and headaches  Psychiatric/Behavioral: Negative for confusion, dysphoric mood and sleep disturbance  The patient is not nervous/anxious  Objective:  Vitals:    07/31/19 0823   BP: 118/80   BP Location: Left arm   Patient Position: Sitting   Cuff Size: Adult   Pulse: 88   Resp: 18   Temp: 97 5 °F (36 4 °C)   TempSrc: Tympanic   SpO2: 98%   Weight: 65 8 kg (145 lb)   Height: 5' 7" (1 702 m)     Body mass index is 22 71 kg/m²  Physical Exam   Constitutional: He is oriented to person, place, and time  He appears well-developed and well-nourished  No distress  HENT:   Head: Normocephalic and atraumatic  Mouth/Throat: Oropharynx is clear and moist    Eyes: Pupils are equal, round, and reactive to light  Conjunctivae and EOM are normal    Cardiovascular: Normal rate, regular rhythm and normal heart sounds  Pulmonary/Chest: Effort normal and breath sounds normal  No respiratory distress  He has no wheezes  He has no rales  Abdominal: Soft  Bowel sounds are normal  He exhibits no distension  There is no tenderness  Neurological: He is alert and oriented to person, place, and time  Psychiatric: He has a normal mood and affect  His behavior is normal  Judgment and thought content normal    Nursing note and vitals reviewed

## 2019-07-31 NOTE — PATIENT INSTRUCTIONS
Buprenorphine/Naloxone (Into the mouth)   Buprenorphine (bue-pre-NOR-feen), Naloxone (nal-OX-one)  Treats narcotic dependence  Brand Name(s): Bunavail, Suboxone, Zubsolv   There may be other brand names for this medicine  When This Medicine Should Not Be Used: This medicine is not right for everyone  Do not use it if you had an allergic reaction to buprenorphine or naloxone  How to Use This Medicine: Thin Sheet, Tablet  · Take your medicine as directed  Your dose may need to be changed several times to find what works best for you  · You must let the medicine dissolve  Never swallow the film or tablet  Your body may not absorb enough of the medicine if you swallow it  · Your health caregiver should show you how to use the medicine  If you do not understand, ask for help  It is important to use the medicine correctly  · Do not talk while the medicine is in your mouth  · Buccal film: Rinse your mouth with water to moisten it  Place the film against the inside of your cheek  If your doctor told you to use more than 1 film, place the second film inside your other cheek  Do not place more than 2 films inside of 1 cheek at a time  Do not move or touch the film  Do not eat or drink anything until the film is completely dissolved  · Sublingual tablet: Place the tablet under your tongue  If your doctor told you to use more than 1 tablet, place all of the tablets in different places under your tongue at the same time  You can use 2 tablets at a time until you have taken all of the medicine, if that is easier for you  Let the tablets dissolve completely in your mouth  Do not eat or drink anything until the tablets are completely dissolved  · Sublingual film: Drink some water to help moisten your mouth  Place the film under your tongue  If your doctor told you to use more than 1 film, place the second film on the opposite side from the first one  Do not move the film after you place it under your tongue   If you are supposed to use more than 2 films, use them the same way, but do not start until the first 2 films are completely dissolved  · Do not break, crush, chew, or cut the film or tablet  · This medicine should come with a Medication Guide  Ask your pharmacist for a copy if you do not have one  · Missed dose: Take a dose as soon as you remember  If it is almost time for your next dose, wait until then and take a regular dose  Do not take extra medicine to make up for a missed dose  · Store the medicine in a closed container at room temperature, away from heat, moisture, and direct light  Ask your pharmacist about the best way to dispose of medicine you do not use  Drugs and Foods to Avoid:   Ask your doctor or pharmacist before using any other medicine, including over-the-counter medicines, vitamins, and herbal products  · Do not use this medicine if you are using or have used an MAO inhibitor within the past 14 days  · Some medicines can affect how buprenorphine/naloxone works  Tell your doctor if you are using the following:   ¨ Carbamazepine, erythromycin, ketoconazole, mirtazapine, phenobarbital, phenytoin, rifampin, tramadol, or trazodone  ¨ Medicine to treat depression  ¨ Medicine to treat HIV/AIDS (including atazanavir, delavirdine, efavirenz, etravirine, nevirapine, ritonavir)  ¨ Phenothiazine medicine  · Do not drink alcohol while you are using this medicine  · Tell your doctor if you use anything else that makes you sleepy  Some examples are allergy medicine, narcotic pain medicine, and alcohol  Tell your doctor if you are also using butorphanol, nalbuphine, pentazocine, or a muscle relaxer    Warnings While Using This Medicine:   · Tell your doctor if you are pregnant or breastfeeding, or if you have kidney disease, liver disease (including hepatitis), lung or breathing problems, adrenal gland problems, an enlarged prostate, trouble urinating, gallbladder problems, low thyroid levels, stomach problems, or a history of depression, brain tumor, head injury, alcohol or drug abuse  · This medicine may cause the following problems:  ¨ High risk of overdose, which can lead to death  ¨ Respiratory depression (serious breathing problem that can be life-threatening)  ¨ Liver problems  ¨ Serotonin syndrome, when used with certain medicines  · This medicine may make you dizzy or drowsy  Do not drive or do anything that could be dangerous until you know how this medicine affects you  Stand or sit up slowly if you feel lightheaded or dizzy  · Tell any doctor or dentist who treats you that you are using this medicine  · This medicine can be habit-forming  Do not use more than your prescribed dose  Call your doctor if you think your medicine is not working  · Do not stop using this medicine suddenly  Your doctor will need to slowly decrease your dose before you stop it completely  · This medicine could cause infertility  Talk with your doctor before using this medicine if you plan to have children  · Keep all medicine out of the reach of children  Never share your medicine with anyone    Possible Side Effects While Using This Medicine:   Call your doctor right away if you notice any of these side effects:  · Allergic reaction: Itching or hives, swelling in your face or hands, swelling or tingling in your mouth or throat, chest tightness, trouble breathing  · Blue lips, fingernails, or skin  · Dark urine or pale stools, nausea, vomiting, loss of appetite, stomach pain, yellow skin or eyes  · Extreme dizziness or weakness, shallow breathing, sweating, seizures, cold or clammy skin  · Severe confusion, lightheadedness, dizziness, or fainting  · Trouble breathing or slow breathing  If you notice these less serious side effects, talk with your doctor:   · Headache, trouble sleeping  · Constipation or upset stomach  · Shaking, feeling hot or cold, runny nose, watery eyes, diarrhea, vomiting, and muscle aches  If you notice other side effects that you think are caused by this medicine, tell your doctor  Call your doctor for medical advice about side effects  You may report side effects to FDA at 7-373-PCM-2032  © 2017 [employer] [employer] Information is for End User's use only and may not be sold, redistributed or otherwise used for commercial purposes  The above information is an  only  It is not intended as medical advice for individual conditions or treatments  Talk to your doctor, nurse or pharmacist before following any medical regimen to see if it is safe and effective for you

## 2019-08-07 LAB
BUPRENORPHINE UR CFM-MCNC: 98 NG/ML
BUPRENORPHINE UR QL CFM: NORMAL NG/ML
BUPRENORPHINE UR QL CFM: POSITIVE
BUPRENORPHINE+NOR UR QL: POSITIVE
NORBUPRENORPHINE UR CFM-MCNC: 310 NG/ML
NORBUPRENORPHINE UR QL CFM: POSITIVE

## 2019-08-15 DIAGNOSIS — G47.00 INSOMNIA, UNSPECIFIED TYPE: ICD-10-CM

## 2019-08-15 RX ORDER — ZOLPIDEM TARTRATE 10 MG/1
10 TABLET ORAL
Qty: 30 TABLET | Refills: 0 | Status: SHIPPED | OUTPATIENT
Start: 2019-08-15 | End: 2019-09-12 | Stop reason: SDUPTHER

## 2019-08-28 ENCOUNTER — OFFICE VISIT (OUTPATIENT)
Dept: INTERNAL MEDICINE CLINIC | Facility: CLINIC | Age: 24
End: 2019-08-28
Payer: COMMERCIAL

## 2019-08-28 VITALS
DIASTOLIC BLOOD PRESSURE: 68 MMHG | HEART RATE: 80 BPM | WEIGHT: 143 LBS | TEMPERATURE: 98 F | SYSTOLIC BLOOD PRESSURE: 114 MMHG | RESPIRATION RATE: 14 BRPM | HEIGHT: 67 IN | BODY MASS INDEX: 22.44 KG/M2

## 2019-08-28 DIAGNOSIS — Z79.899 ENCOUNTER FOR MONITORING SUBOXONE MAINTENANCE THERAPY: ICD-10-CM

## 2019-08-28 DIAGNOSIS — F11.10 HEROIN ABUSE (HCC): ICD-10-CM

## 2019-08-28 DIAGNOSIS — F19.20 DRUG DEPENDENCE (HCC): Primary | ICD-10-CM

## 2019-08-28 DIAGNOSIS — Z79.899 DRUG THERAPY CONTINUED: ICD-10-CM

## 2019-08-28 DIAGNOSIS — Z51.81 ENCOUNTER FOR MONITORING SUBOXONE MAINTENANCE THERAPY: ICD-10-CM

## 2019-08-28 DIAGNOSIS — F19.10 IV DRUG ABUSE (HCC): ICD-10-CM

## 2019-08-28 DIAGNOSIS — Z13.31 NEGATIVE DEPRESSION SCREENING: ICD-10-CM

## 2019-08-28 PROCEDURE — 80307 DRUG TEST PRSMV CHEM ANLYZR: CPT | Performed by: INTERNAL MEDICINE

## 2019-08-28 PROCEDURE — 99213 OFFICE O/P EST LOW 20 MIN: CPT | Performed by: INTERNAL MEDICINE

## 2019-08-28 PROCEDURE — 1036F TOBACCO NON-USER: CPT | Performed by: INTERNAL MEDICINE

## 2019-08-28 PROCEDURE — 3008F BODY MASS INDEX DOCD: CPT | Performed by: INTERNAL MEDICINE

## 2019-08-28 RX ORDER — BUPRENORPHINE HYDROCHLORIDE AND NALOXONE HYDROCHLORIDE DIHYDRATE 8; 2 MG/1; MG/1
1 TABLET SUBLINGUAL DAILY
Qty: 30 TABLET | Refills: 0 | Status: SHIPPED | OUTPATIENT
Start: 2019-08-28 | End: 2019-09-23

## 2019-08-28 NOTE — PROGRESS NOTES
Assessment/Plan:  Problem List Items Addressed This Visit        Other    Drug dependence ([place] [address] ) - Primary    Relevant Medications    buprenorphine-naloxone (SUBOXONE) 8-2 mg per SL tablet    Other Relevant Orders    Toxicology screen, urine    Suboxone    Encounter for monitoring Suboxone maintenance therapy    Relevant Medications    buprenorphine-naloxone (SUBOXONE) 8-2 mg per SL tablet    Other Relevant Orders    Toxicology screen, urine    Suboxone    Drug therapy continued    Relevant Medications    buprenorphine-naloxone (SUBOXONE) 8-2 mg per SL tablet    Other Relevant Orders    Toxicology screen, urine    Suboxone    Heroin abuse ([place] [address] )    Relevant Medications    buprenorphine-naloxone (SUBOXONE) 8-2 mg per SL tablet    Other Relevant Orders    Toxicology screen, urine    Suboxone    IV drug abuse ([place] [address] )    Relevant Medications    buprenorphine-naloxone (SUBOXONE) 8-2 mg per SL tablet    Other Relevant Orders    Toxicology screen, urine    Suboxone      Other Visit Diagnoses     Negative depression screening               Diagnoses and all orders for this visit:    Drug dependence ([place] [address] )  -     Toxicology screen, urine  -     Suboxone  -     buprenorphine-naloxone (SUBOXONE) 8-2 mg per SL tablet; Place 1 tablet under the tongue daily    Drug therapy continued  -     Toxicology screen, urine  -     Suboxone  -     buprenorphine-naloxone (SUBOXONE) 8-2 mg per SL tablet; Place 1 tablet under the tongue daily    Encounter for monitoring Suboxone maintenance therapy  -     Toxicology screen, urine  -     Suboxone  -     buprenorphine-naloxone (SUBOXONE) 8-2 mg per SL tablet; Place 1 tablet under the tongue daily    Heroin abuse ([place] [address] )  -     Toxicology screen, urine  -     Suboxone  -     buprenorphine-naloxone (SUBOXONE) 8-2 mg per SL tablet; Place 1 tablet under the tongue daily    IV drug abuse ([place] [address] )  -     Toxicology screen, urine  -     Suboxone  -     buprenorphine-naloxone (SUBOXONE) 8-2 mg per SL tablet; Place 1 tablet under the tongue daily    Negative depression screening        No problem-specific Assessment & Plan notes found for this encounter  A/P: Doing well and will continue 8mg tabs, dose 6/6 and get into counseling  Consider weaning next visit  Reminded to keep meds safe and out of reach of children  RTC 4 weeks  Subjective:      Patient ID: Alexandra Traore is a 30 y o  male  WM presents for f/u suboxone  Doing well and no c/o's  Not using and no withdraw  Does not attend counseling  UDT obtained today  Tolerating the meds and no side effects  The following portions of the patient's history were reviewed and updated as appropriate:   He has a past medical history of Bipolar 2 disorder ([facility] ), MAURICE (generalized anxiety disorder) (10/10/2018), Heroin abuse ([facility] ) (10/10/2018), and Manic behavior ([facility] )  ,  does not have any pertinent problems on file  ,   has a past surgical history that includes Mandible fracture surgery and Strawn tooth extraction  ,  family history includes Hepatitis in his father; Multiple sclerosis in his mother; Seizures in his mother  ,   reports that he has quit smoking  He has a 3 00 pack-year smoking history  He has quit using smokeless tobacco   His smokeless tobacco use included chew  He reports that he has current or past drug history  Drugs: Amphetamines, Cocaine, Hydrocodone, Heroin, Marijuana, and Oxycodone  He reports that he does not drink alcohol ,  is allergic to topamax [topiramate]     Current Outpatient Medications   Medication Sig Dispense Refill    buprenorphine-naloxone (SUBOXONE) 8-2 mg per SL tablet Place 1 tablet under the tongue daily 30 tablet 0    cloNIDine (CATAPRES) 0 1 mg tablet Take 1 tablet (0 1 mg total) by mouth 3 (three) times a day 90 tablet 5    NARCAN 4 MG/0 1ML LIQD instill 1 spray in 1 NOSTRIL if needed for opioid overdose may re     (REFER TO PRESCRIPTION NOTES)    0    zolpidem (AMBIEN) 10 mg tablet Take 1 tablet (10 mg total) by mouth daily at bedtime as needed for sleep 30 tablet 0     No current facility-administered medications for this visit  Review of Systems   Constitutional: Negative for activity change, chills, diaphoresis, fatigue and fever  Respiratory: Negative for cough, chest tightness, shortness of breath and wheezing  Cardiovascular: Negative for chest pain, palpitations and leg swelling  Gastrointestinal: Negative for abdominal pain, constipation, diarrhea, nausea and vomiting  Genitourinary: Negative for difficulty urinating, dysuria and frequency  Musculoskeletal: Negative for arthralgias, gait problem and myalgias  Neurological: Negative for dizziness, seizures, syncope, weakness, light-headedness and headaches  Psychiatric/Behavioral: Negative for confusion, dysphoric mood, self-injury, sleep disturbance and suicidal ideas  The patient is not nervous/anxious  PHQ-9 Depression Screening    PHQ-9:    Frequency of the following problems over the past two weeks:       Little interest or pleasure in doing things:  0 - not at all  Feeling down, depressed, or hopeless:  0 - not at all  PHQ-2 Score:  0        Objective:  Vitals:    08/28/19 0812   BP: 114/68   Pulse: 80   Resp: 14   Temp: 98 °F (36 7 °C)   TempSrc: Tympanic   Weight: 64 9 kg (143 lb)   Height: 5' 7" (1 702 m)     Body mass index is 22 4 kg/m²  Physical Exam   Constitutional: He is oriented to person, place, and time  He appears well-developed and well-nourished  No distress  HENT:   Head: Normocephalic and atraumatic  Mouth/Throat: Oropharynx is clear and moist    Eyes: Pupils are equal, round, and reactive to light  Conjunctivae and EOM are normal    Cardiovascular: Normal rate, regular rhythm and normal heart sounds  Pulmonary/Chest: Effort normal and breath sounds normal  No respiratory distress  He has no wheezes  He has no rales  Abdominal: Soft  Bowel sounds are normal  He exhibits no distension   There is no tenderness  Neurological: He is alert and oriented to person, place, and time  Psychiatric: He has a normal mood and affect  His behavior is normal  Judgment and thought content normal    Nursing note and vitals reviewed

## 2019-08-28 NOTE — PATIENT INSTRUCTIONS
Buprenorphine/Naloxone (Into the mouth)   Buprenorphine (bue-pre-NOR-feen), Naloxone (nal-OX-one)  Treats narcotic dependence  Brand Name(s): Bunavail, Suboxone, Zubsolv   There may be other brand names for this medicine  When This Medicine Should Not Be Used: This medicine is not right for everyone  Do not use it if you had an allergic reaction to buprenorphine or naloxone  How to Use This Medicine: Thin Sheet, Tablet  · Take your medicine as directed  Your dose may need to be changed several times to find what works best for you  · You must let the medicine dissolve  Never swallow the film or tablet  Your body may not absorb enough of the medicine if you swallow it  · Your health caregiver should show you how to use the medicine  If you do not understand, ask for help  It is important to use the medicine correctly  · Do not talk while the medicine is in your mouth  · Buccal film: Rinse your mouth with water to moisten it  Place the film against the inside of your cheek  If your doctor told you to use more than 1 film, place the second film inside your other cheek  Do not place more than 2 films inside of 1 cheek at a time  Do not move or touch the film  Do not eat or drink anything until the film is completely dissolved  · Sublingual tablet: Place the tablet under your tongue  If your doctor told you to use more than 1 tablet, place all of the tablets in different places under your tongue at the same time  You can use 2 tablets at a time until you have taken all of the medicine, if that is easier for you  Let the tablets dissolve completely in your mouth  Do not eat or drink anything until the tablets are completely dissolved  · Sublingual film: Drink some water to help moisten your mouth  Place the film under your tongue  If your doctor told you to use more than 1 film, place the second film on the opposite side from the first one  Do not move the film after you place it under your tongue   If you are supposed to use more than 2 films, use them the same way, but do not start until the first 2 films are completely dissolved  · Do not break, crush, chew, or cut the film or tablet  · This medicine should come with a Medication Guide  Ask your pharmacist for a copy if you do not have one  · Missed dose: Take a dose as soon as you remember  If it is almost time for your next dose, wait until then and take a regular dose  Do not take extra medicine to make up for a missed dose  · Store the medicine in a closed container at room temperature, away from heat, moisture, and direct light  Ask your pharmacist about the best way to dispose of medicine you do not use  Drugs and Foods to Avoid:   Ask your doctor or pharmacist before using any other medicine, including over-the-counter medicines, vitamins, and herbal products  · Do not use this medicine if you are using or have used an MAO inhibitor within the past 14 days  · Some medicines can affect how buprenorphine/naloxone works  Tell your doctor if you are using the following:   ¨ Carbamazepine, erythromycin, ketoconazole, mirtazapine, phenobarbital, phenytoin, rifampin, tramadol, or trazodone  ¨ Medicine to treat depression  ¨ Medicine to treat HIV/AIDS (including atazanavir, delavirdine, efavirenz, etravirine, nevirapine, ritonavir)  ¨ Phenothiazine medicine  · Do not drink alcohol while you are using this medicine  · Tell your doctor if you use anything else that makes you sleepy  Some examples are allergy medicine, narcotic pain medicine, and alcohol  Tell your doctor if you are also using butorphanol, nalbuphine, pentazocine, or a muscle relaxer    Warnings While Using This Medicine:   · Tell your doctor if you are pregnant or breastfeeding, or if you have kidney disease, liver disease (including hepatitis), lung or breathing problems, adrenal gland problems, an enlarged prostate, trouble urinating, gallbladder problems, low thyroid levels, stomach problems, or a history of depression, brain tumor, head injury, alcohol or drug abuse  · This medicine may cause the following problems:  ¨ High risk of overdose, which can lead to death  ¨ Respiratory depression (serious breathing problem that can be life-threatening)  ¨ Liver problems  ¨ Serotonin syndrome, when used with certain medicines  · This medicine may make you dizzy or drowsy  Do not drive or do anything that could be dangerous until you know how this medicine affects you  Stand or sit up slowly if you feel lightheaded or dizzy  · Tell any doctor or dentist who treats you that you are using this medicine  · This medicine can be habit-forming  Do not use more than your prescribed dose  Call your doctor if you think your medicine is not working  · Do not stop using this medicine suddenly  Your doctor will need to slowly decrease your dose before you stop it completely  · This medicine could cause infertility  Talk with your doctor before using this medicine if you plan to have children  · Keep all medicine out of the reach of children  Never share your medicine with anyone    Possible Side Effects While Using This Medicine:   Call your doctor right away if you notice any of these side effects:  · Allergic reaction: Itching or hives, swelling in your face or hands, swelling or tingling in your mouth or throat, chest tightness, trouble breathing  · Blue lips, fingernails, or skin  · Dark urine or pale stools, nausea, vomiting, loss of appetite, stomach pain, yellow skin or eyes  · Extreme dizziness or weakness, shallow breathing, sweating, seizures, cold or clammy skin  · Severe confusion, lightheadedness, dizziness, or fainting  · Trouble breathing or slow breathing  If you notice these less serious side effects, talk with your doctor:   · Headache, trouble sleeping  · Constipation or upset stomach  · Shaking, feeling hot or cold, runny nose, watery eyes, diarrhea, vomiting, and muscle aches  If you notice other side effects that you think are caused by this medicine, tell your doctor  Call your doctor for medical advice about side effects  You may report side effects to FDA at 4-361-UVQ-3632  © 2017 [employer] [employer] Information is for End User's use only and may not be sold, redistributed or otherwise used for commercial purposes  The above information is an  only  It is not intended as medical advice for individual conditions or treatments  Talk to your doctor, nurse or pharmacist before following any medical regimen to see if it is safe and effective for you

## 2019-09-04 LAB
BUPRENORPHINE UR CFM-MCNC: 74 NG/ML
BUPRENORPHINE UR QL CFM: NORMAL NG/ML
BUPRENORPHINE UR QL CFM: POSITIVE
BUPRENORPHINE+NOR UR QL: POSITIVE
NORBUPRENORPHINE UR CFM-MCNC: 306 NG/ML
NORBUPRENORPHINE UR QL CFM: POSITIVE

## 2019-09-12 DIAGNOSIS — G47.00 INSOMNIA, UNSPECIFIED TYPE: ICD-10-CM

## 2019-09-12 RX ORDER — ZOLPIDEM TARTRATE 10 MG/1
10 TABLET ORAL
Qty: 30 TABLET | Refills: 0 | Status: SHIPPED | OUTPATIENT
Start: 2019-09-12 | End: 2019-10-14 | Stop reason: SDUPTHER

## 2019-09-12 NOTE — TELEPHONE ENCOUNTER
Scheduled Medication Review:  Pt's scheduled medication use was reviewed by myself/staff via the [employer] website  Pt's use has been found to be appropriate w/o any concerns for misuse by the patient  Pt's current conditions require continued scheduled medication use at this time  Future review for continued appropriate medication use and misuse will continue

## 2019-09-23 ENCOUNTER — OFFICE VISIT (OUTPATIENT)
Dept: INTERNAL MEDICINE CLINIC | Facility: CLINIC | Age: 24
End: 2019-09-23
Payer: COMMERCIAL

## 2019-09-23 VITALS
SYSTOLIC BLOOD PRESSURE: 118 MMHG | HEART RATE: 79 BPM | BODY MASS INDEX: 22.29 KG/M2 | RESPIRATION RATE: 16 BRPM | WEIGHT: 142 LBS | HEIGHT: 67 IN | TEMPERATURE: 97.6 F | DIASTOLIC BLOOD PRESSURE: 72 MMHG | OXYGEN SATURATION: 99 %

## 2019-09-23 DIAGNOSIS — Z79.899 ENCOUNTER FOR MONITORING SUBOXONE MAINTENANCE THERAPY: ICD-10-CM

## 2019-09-23 DIAGNOSIS — F19.20 DRUG DEPENDENCE (HCC): Primary | ICD-10-CM

## 2019-09-23 DIAGNOSIS — Z79.899 DRUG THERAPY CONTINUED: ICD-10-CM

## 2019-09-23 DIAGNOSIS — Z51.81 ENCOUNTER FOR MONITORING SUBOXONE MAINTENANCE THERAPY: ICD-10-CM

## 2019-09-23 DIAGNOSIS — Z13.31 NEGATIVE DEPRESSION SCREENING: ICD-10-CM

## 2019-09-23 DIAGNOSIS — F11.10 HEROIN ABUSE (HCC): ICD-10-CM

## 2019-09-23 PROCEDURE — 99213 OFFICE O/P EST LOW 20 MIN: CPT | Performed by: INTERNAL MEDICINE

## 2019-09-23 PROCEDURE — 80307 DRUG TEST PRSMV CHEM ANLYZR: CPT | Performed by: INTERNAL MEDICINE

## 2019-09-23 RX ORDER — BUPRENORPHINE HYDROCHLORIDE AND NALOXONE HYDROCHLORIDE DIHYDRATE 8; 2 MG/1; MG/1
TABLET SUBLINGUAL
Qty: 23 TABLET | Refills: 0 | Status: SHIPPED | OUTPATIENT
Start: 2019-09-23 | End: 2019-09-23

## 2019-09-23 RX ORDER — BUPRENORPHINE HYDROCHLORIDE AND NALOXONE HYDROCHLORIDE DIHYDRATE 8; 2 MG/1; MG/1
1 TABLET SUBLINGUAL DAILY
Qty: 30 TABLET | Refills: 0 | Status: SHIPPED | OUTPATIENT
Start: 2019-09-23 | End: 2019-10-23 | Stop reason: SDUPTHER

## 2019-09-23 NOTE — PATIENT INSTRUCTIONS
Buprenorphine/Naloxone (Into the mouth)   Buprenorphine (bue-pre-NOR-feen), Naloxone (nal-OX-one)  Treats narcotic dependence  Brand Name(s): Bunavail, Suboxone, Zubsolv   There may be other brand names for this medicine  When This Medicine Should Not Be Used: This medicine is not right for everyone  Do not use it if you had an allergic reaction to buprenorphine or naloxone  How to Use This Medicine: Thin Sheet, Tablet  · Take your medicine as directed  Your dose may need to be changed several times to find what works best for you  · You must let the medicine dissolve  Never swallow the film or tablet  Your body may not absorb enough of the medicine if you swallow it  · Your health caregiver should show you how to use the medicine  If you do not understand, ask for help  It is important to use the medicine correctly  · Do not talk while the medicine is in your mouth  · Buccal film: Rinse your mouth with water to moisten it  Place the film against the inside of your cheek  If your doctor told you to use more than 1 film, place the second film inside your other cheek  Do not place more than 2 films inside of 1 cheek at a time  Do not move or touch the film  Do not eat or drink anything until the film is completely dissolved  · Sublingual tablet: Place the tablet under your tongue  If your doctor told you to use more than 1 tablet, place all of the tablets in different places under your tongue at the same time  You can use 2 tablets at a time until you have taken all of the medicine, if that is easier for you  Let the tablets dissolve completely in your mouth  Do not eat or drink anything until the tablets are completely dissolved  · Sublingual film: Drink some water to help moisten your mouth  Place the film under your tongue  If your doctor told you to use more than 1 film, place the second film on the opposite side from the first one  Do not move the film after you place it under your tongue   If you are supposed to use more than 2 films, use them the same way, but do not start until the first 2 films are completely dissolved  · Do not break, crush, chew, or cut the film or tablet  · This medicine should come with a Medication Guide  Ask your pharmacist for a copy if you do not have one  · Missed dose: Take a dose as soon as you remember  If it is almost time for your next dose, wait until then and take a regular dose  Do not take extra medicine to make up for a missed dose  · Store the medicine in a closed container at room temperature, away from heat, moisture, and direct light  Ask your pharmacist about the best way to dispose of medicine you do not use  Drugs and Foods to Avoid:   Ask your doctor or pharmacist before using any other medicine, including over-the-counter medicines, vitamins, and herbal products  · Do not use this medicine if you are using or have used an MAO inhibitor within the past 14 days  · Some medicines can affect how buprenorphine/naloxone works  Tell your doctor if you are using the following:   ¨ Carbamazepine, erythromycin, ketoconazole, mirtazapine, phenobarbital, phenytoin, rifampin, tramadol, or trazodone  ¨ Medicine to treat depression  ¨ Medicine to treat HIV/AIDS (including atazanavir, delavirdine, efavirenz, etravirine, nevirapine, ritonavir)  ¨ Phenothiazine medicine  · Do not drink alcohol while you are using this medicine  · Tell your doctor if you use anything else that makes you sleepy  Some examples are allergy medicine, narcotic pain medicine, and alcohol  Tell your doctor if you are also using butorphanol, nalbuphine, pentazocine, or a muscle relaxer    Warnings While Using This Medicine:   · Tell your doctor if you are pregnant or breastfeeding, or if you have kidney disease, liver disease (including hepatitis), lung or breathing problems, adrenal gland problems, an enlarged prostate, trouble urinating, gallbladder problems, low thyroid levels, stomach problems, or a history of depression, brain tumor, head injury, alcohol or drug abuse  · This medicine may cause the following problems:  ¨ High risk of overdose, which can lead to death  ¨ Respiratory depression (serious breathing problem that can be life-threatening)  ¨ Liver problems  ¨ Serotonin syndrome, when used with certain medicines  · This medicine may make you dizzy or drowsy  Do not drive or do anything that could be dangerous until you know how this medicine affects you  Stand or sit up slowly if you feel lightheaded or dizzy  · Tell any doctor or dentist who treats you that you are using this medicine  · This medicine can be habit-forming  Do not use more than your prescribed dose  Call your doctor if you think your medicine is not working  · Do not stop using this medicine suddenly  Your doctor will need to slowly decrease your dose before you stop it completely  · This medicine could cause infertility  Talk with your doctor before using this medicine if you plan to have children  · Keep all medicine out of the reach of children  Never share your medicine with anyone    Possible Side Effects While Using This Medicine:   Call your doctor right away if you notice any of these side effects:  · Allergic reaction: Itching or hives, swelling in your face or hands, swelling or tingling in your mouth or throat, chest tightness, trouble breathing  · Blue lips, fingernails, or skin  · Dark urine or pale stools, nausea, vomiting, loss of appetite, stomach pain, yellow skin or eyes  · Extreme dizziness or weakness, shallow breathing, sweating, seizures, cold or clammy skin  · Severe confusion, lightheadedness, dizziness, or fainting  · Trouble breathing or slow breathing  If you notice these less serious side effects, talk with your doctor:   · Headache, trouble sleeping  · Constipation or upset stomach  · Shaking, feeling hot or cold, runny nose, watery eyes, diarrhea, vomiting, and muscle aches  If you notice other side effects that you think are caused by this medicine, tell your doctor  Call your doctor for medical advice about side effects  You may report side effects to FDA at 2-436-UVR-1502  © 2017 [employer] Information is for End User's use only and may not be sold, redistributed or otherwise used for commercial purposes  The above information is an  only  It is not intended as medical advice for individual conditions or treatments  Talk to your doctor, nurse or pharmacist before following any medical regimen to see if it is safe and effective for you

## 2019-09-23 NOTE — PROGRESS NOTES
Assessment/Plan:  Problem List Items Addressed This Visit        Other    Drug dependence ([place] [address] ) - Primary    Relevant Medications    buprenorphine-naloxone (SUBOXONE) 8-2 mg per SL tablet    Other Relevant Orders    Toxicology screen, urine    Suboxone    Encounter for monitoring Suboxone maintenance therapy    Relevant Medications    buprenorphine-naloxone (SUBOXONE) 8-2 mg per SL tablet    Drug therapy continued    Relevant Medications    buprenorphine-naloxone (SUBOXONE) 8-2 mg per SL tablet    Heroin abuse ([place] [address] )    Relevant Medications    buprenorphine-naloxone (SUBOXONE) 8-2 mg per SL tablet    Other Relevant Orders    Toxicology screen, urine    Suboxone      Other Visit Diagnoses     Negative depression screening        Relevant Medications    buprenorphine-naloxone (SUBOXONE) 8-2 mg per SL tablet           Diagnoses and all orders for this visit:    Drug dependence ([place] [address] )  -     Toxicology screen, urine  -     Suboxone  -     Discontinue: buprenorphine-naloxone (SUBOXONE) 8-2 mg per SL tablet; Three quarters of a tab sl q day  -     buprenorphine-naloxone (SUBOXONE) 8-2 mg per SL tablet; Place 1 tablet under the tongue daily    Heroin abuse ([place] [address] )  -     Toxicology screen, urine  -     Suboxone  -     Discontinue: buprenorphine-naloxone (SUBOXONE) 8-2 mg per SL tablet; Three quarters of a tab sl q day  -     buprenorphine-naloxone (SUBOXONE) 8-2 mg per SL tablet; Place 1 tablet under the tongue daily    Drug therapy continued  -     Discontinue: buprenorphine-naloxone (SUBOXONE) 8-2 mg per SL tablet; Three quarters of a tab sl q day  -     buprenorphine-naloxone (SUBOXONE) 8-2 mg per SL tablet; Place 1 tablet under the tongue daily    Encounter for monitoring Suboxone maintenance therapy  -     Discontinue: buprenorphine-naloxone (SUBOXONE) 8-2 mg per SL tablet; Three quarters of a tab sl q day    -     buprenorphine-naloxone (SUBOXONE) 8-2 mg per SL tablet; Place 1 tablet under the tongue daily    Negative depression screening  -     Discontinue: buprenorphine-naloxone (SUBOXONE) 8-2 mg per SL tablet; Three quarters of a tab sl q day  -     buprenorphine-naloxone (SUBOXONE) 8-2 mg per SL tablet; Place 1 tablet under the tongue daily        No problem-specific Assessment & Plan notes found for this encounter  Scheduled Medication Review:  Pt's scheduled medication use was reviewed by myself/staff via the [employer] website  Pt's use has been found to be appropriate w/o any concerns for misuse by the patient  Pt's current conditions require continued scheduled medication use at this time  Future review for continued appropriate medication use and misuse will continue  A/P: Doing ok, but will hold on a wean and continue 8mg tabs, dose 7/6 and continue with NA mtgs, but recommend counseling  Reminded to keep meds safe and out of reach of children  RTC 4 weeks  Subjective: WM presents for f/u suboxone  Doing well and no c/o's  Not using and no withdraw  Does  Attend NA mtg, but no  counseling  UDT obtained today  Tolerating the meds and no side effects  Patient ID: Sheila Lujan is a 30 y o  male  HPI    The following portions of the patient's history were reviewed and updated as appropriate:   He has a past medical history of Bipolar 2 disorder ([facility] [address] ), MAURICE (generalized anxiety disorder) (10/10/2018), Heroin abuse ([facility] ) (10/10/2018), and Manic behavior ([facility] )  ,  does not have any pertinent problems on file  ,   has a past surgical history that includes Mandible fracture surgery and [place] tooth extraction  ,  family history includes Hepatitis in his father; Multiple sclerosis in his mother; Seizures in his mother  ,   reports that he has quit smoking  He has a 3 00 pack-year smoking history  He has quit using smokeless tobacco   His smokeless tobacco use included chew  He reports that he has current or past drug history  Drugs: Amphetamines, Cocaine, Hydrocodone, Heroin, Marijuana, and Oxycodone   He reports that he does not drink alcohol ,  is allergic to topamax [topiramate]     Current Outpatient Medications   Medication Sig Dispense Refill    cloNIDine (CATAPRES) 0 1 mg tablet Take 1 tablet (0 1 mg total) by mouth 3 (three) times a day 90 tablet 5    NARCAN 4 MG/0 1ML LIQD instill 1 spray in 1 NOSTRIL if needed for opioid overdose may re     (REFER TO PRESCRIPTION NOTES)  0    zolpidem (AMBIEN) 10 mg tablet Take 1 tablet (10 mg total) by mouth daily at bedtime as needed for sleep 30 tablet 0    buprenorphine-naloxone (SUBOXONE) 8-2 mg per SL tablet Place 1 tablet under the tongue daily 30 tablet 0     No current facility-administered medications for this visit  Review of Systems   Constitutional: Negative for activity change, chills, diaphoresis, fatigue and fever  Respiratory: Negative for cough, chest tightness, shortness of breath and wheezing  Cardiovascular: Negative for chest pain, palpitations and leg swelling  Gastrointestinal: Negative for abdominal pain, constipation, diarrhea, nausea and vomiting  Genitourinary: Negative for difficulty urinating, dysuria and frequency  Musculoskeletal: Negative for arthralgias, gait problem and myalgias  Neurological: Negative for dizziness, seizures, syncope, weakness, light-headedness and headaches  Psychiatric/Behavioral: Negative for confusion, dysphoric mood, self-injury, sleep disturbance and suicidal ideas  The patient is not nervous/anxious          PHQ-9 Depression Screening    PHQ-9:    Frequency of the following problems over the past two weeks:       Little interest or pleasure in doing things:  0 - not at all  Feeling down, depressed, or hopeless:  0 - not at all  PHQ-2 Score:  0        Objective:  Vitals:    09/23/19 0927   BP: 118/72   BP Location: Left arm   Patient Position: Sitting   Cuff Size: Adult   Pulse: 79   Resp: 16   Temp: 97 6 °F (36 4 °C)   TempSrc: Tympanic   SpO2: 99%   Weight: 64 4 kg (142 lb)   Height: 5' 7" (1 702 m)     Body mass index is 22 24 kg/m²  Physical Exam   Constitutional: He is oriented to person, place, and time  He appears well-developed and well-nourished  No distress  HENT:   Head: Normocephalic and atraumatic  Mouth/Throat: Oropharynx is clear and moist    Eyes: Pupils are equal, round, and reactive to light  Conjunctivae and EOM are normal    Cardiovascular: Normal rate, regular rhythm and normal heart sounds  Pulmonary/Chest: Effort normal and breath sounds normal  No respiratory distress  He has no wheezes  He has no rales  Abdominal: Soft  Bowel sounds are normal  He exhibits no distension  There is no tenderness  Neurological: He is alert and oriented to person, place, and time  Psychiatric: He has a normal mood and affect   His behavior is normal  Judgment and thought content normal

## 2019-09-28 LAB
BUPRENORPHINE UR CFM-MCNC: 72 NG/ML
BUPRENORPHINE UR QL CFM: NORMAL NG/ML
BUPRENORPHINE UR QL CFM: POSITIVE
BUPRENORPHINE+NOR UR QL: POSITIVE
NORBUPRENORPHINE UR CFM-MCNC: 198 NG/ML
NORBUPRENORPHINE UR QL CFM: POSITIVE

## 2019-10-14 DIAGNOSIS — G47.00 INSOMNIA, UNSPECIFIED TYPE: ICD-10-CM

## 2019-10-14 RX ORDER — ZOLPIDEM TARTRATE 10 MG/1
10 TABLET ORAL
Qty: 30 TABLET | Refills: 0 | Status: SHIPPED | OUTPATIENT
Start: 2019-10-14 | End: 2019-11-27 | Stop reason: SDUPTHER

## 2019-10-14 NOTE — TELEPHONE ENCOUNTER
Scheduled Medication Review:  Pt's scheduled medication use was reviewed by myself/staff via the [email or web address] website  Pt's use has been found to be appropriate w/o any concerns for misuse by the patient  Pt's current conditions require continued scheduled medication use at this time  Future review for continued appropriate medication use and misuse will continue

## 2019-10-16 ENCOUNTER — HOSPITAL ENCOUNTER (EMERGENCY)
Facility: HOSPITAL | Age: 24
Discharge: HOME | End: 2019-10-17
Attending: EMERGENCY MEDICINE | Admitting: EMERGENCY MEDICINE
Payer: COMMERCIAL

## 2019-10-16 VITALS
WEIGHT: 155 LBS | TEMPERATURE: 98.4 F | RESPIRATION RATE: 20 BRPM | DIASTOLIC BLOOD PRESSURE: 98 MMHG | OXYGEN SATURATION: 98 % | SYSTOLIC BLOOD PRESSURE: 142 MMHG | BODY MASS INDEX: 24.33 KG/M2 | HEIGHT: 67 IN | HEART RATE: 70 BPM

## 2019-10-16 DIAGNOSIS — K08.89 PAIN, DENTAL: Primary | ICD-10-CM

## 2019-10-16 DIAGNOSIS — K04.7 DENTAL INFECTION: ICD-10-CM

## 2019-10-16 PROCEDURE — 99283 EMERGENCY DEPT VISIT LOW MDM: CPT

## 2019-10-16 RX ORDER — KETOROLAC TROMETHAMINE 30 MG/ML
30 INJECTION, SOLUTION INTRAMUSCULAR; INTRAVENOUS ONCE
Status: COMPLETED | OUTPATIENT
Start: 2019-10-17 | End: 2019-10-17

## 2019-10-16 RX ORDER — AMOXICILLIN AND CLAVULANATE POTASSIUM 875; 125 MG/1; MG/1
1 TABLET, FILM COATED ORAL ONCE
Status: COMPLETED | OUTPATIENT
Start: 2019-10-17 | End: 2019-10-17

## 2019-10-17 PROCEDURE — 96372 THER/PROPH/DIAG INJ SC/IM: CPT

## 2019-10-17 RX ORDER — AMOXICILLIN AND CLAVULANATE POTASSIUM 875; 125 MG/1; MG/1
1 TABLET, FILM COATED ORAL EVERY 12 HOURS
Qty: 14 TABLET | Refills: 0 | Status: SHIPPED | OUTPATIENT
Start: 2019-10-17 | End: 2019-10-24

## 2019-10-17 RX ORDER — KETOROLAC TROMETHAMINE 10 MG/1
10 TABLET, FILM COATED ORAL EVERY 6 HOURS PRN
Qty: 20 TABLET | Refills: 0 | Status: SHIPPED | OUTPATIENT
Start: 2019-10-17 | End: 2020-02-03

## 2019-10-17 RX ADMIN — AMOXICILLIN AND CLAVULANATE POTASSIUM 1 TABLET: 875; 125 TABLET, FILM COATED ORAL at 00:01

## 2019-10-17 RX ADMIN — KETOROLAC TROMETHAMINE 30 MG: 30 INJECTION, SOLUTION INTRAMUSCULAR; INTRAVENOUS at 00:01

## 2019-10-17 NOTE — ED PROVIDER NOTES
History  Chief Complaint   Patient presents with    Dental Pain     left upper tooth   in back started yesterday     68-year-old male presents with left upper tooth pain which has been going on for the past 24 hours  States he had a similar pain in his right before he had his right molars extracted  Patient has not seen a dentist about this pain  History provided by:  Patient   used: No    Dental Pain   Location:  Upper  Upper teeth location:  16/PADMINI 3rd molar  Quality:  Throbbing  Severity:  Moderate  Onset quality:  Gradual  Duration:  1 day  Timing:  Constant  Progression:  Worsening  Chronicity:  New  Context: dental fracture (On pizza crust)    Relieved by:  Nothing  Associated symptoms: no congestion, no difficulty swallowing, no drooling, no facial swelling, no fever, no headaches, no neck pain, no neck swelling, no oral bleeding, no oral lesions and no trismus    Risk factors: lack of dental care and periodontal disease        Prior to Admission Medications   Prescriptions Last Dose Informant Patient Reported? Taking? NARCAN 4 MG/0 1ML LIQD   Yes No   Sig: instill 1 spray in 1 NOSTRIL if needed for opioid overdose may re     (REFER TO PRESCRIPTION NOTES)     buprenorphine-naloxone (SUBOXONE) 8-2 mg per SL tablet   No No   Sig: Place 1 tablet under the tongue daily   cloNIDine (CATAPRES) 0 1 mg tablet   No No   Sig: Take 1 tablet (0 1 mg total) by mouth 3 (three) times a day   zolpidem (AMBIEN) 10 mg tablet   No No   Sig: Take 1 tablet (10 mg total) by mouth daily at bedtime as needed for sleep      Facility-Administered Medications: None       Past Medical History:   Diagnosis Date    Bipolar 2 disorder ([place] [address] )     MAURICE (generalized anxiety disorder) 10/10/2018    Heroin abuse ([place] [address] ) 10/10/2018    Manic behavior ([place] [address] )        Past Surgical History:   Procedure Laterality Date    MANDIBLE FRACTURE SURGERY      WISDOM TOOTH EXTRACTION         Family History   Problem Relation Age of Onset    Seizures Mother     Multiple sclerosis Mother     Hepatitis Father      I have reviewed and agree with the history as documented  Social History     Tobacco Use    Smoking status: Former Smoker     Packs/day: 0 50     Years: 6 00     Pack years: 3 00    Smokeless tobacco: Former User     Types: Chew    Tobacco comment: Vapes   Substance Use Topics    Alcohol use: No    Drug use: Yes     Types: Amphetamines, Cocaine, Hydrocodone, Heroin, Marijuana, Oxycodone     Comment: Hasn't used since 9/2/2018        Review of Systems   Constitutional: Negative for fever  HENT: Negative for congestion, drooling, facial swelling and mouth sores  Musculoskeletal: Negative for neck pain  Neurological: Negative for headaches  Physical Exam  Physical Exam   Constitutional: He is oriented to person, place, and time  He appears well-developed and well-nourished  He appears distressed (Moderate due to pain)  HENT:   Mouth/Throat: Uvula is midline and oropharynx is clear and moist  He does not have dentures  No oral lesions  No trismus in the jaw  Abnormal dentition ( poor dentition)  Dental abscesses present  No uvula swelling or lacerations  No posterior oropharyngeal edema, posterior oropharyngeal erythema or tonsillar abscesses  No significant facial swelling   Eyes: Pupils are equal, round, and reactive to light  Conjunctivae and EOM are normal    Neck: Normal range of motion  Neck supple  No tracheal deviation present  No thyromegaly present  Cardiovascular: Normal rate  Pulmonary/Chest: Effort normal and breath sounds normal    Abdominal: He exhibits no distension  Musculoskeletal: Normal range of motion  He exhibits no deformity  Lymphadenopathy:     He has no cervical adenopathy  Neurological: He is alert and oriented to person, place, and time  No cranial nerve deficit  Coordination normal    Skin: Skin is warm  Capillary refill takes less than 2 seconds     Nursing note and vitals reviewed  Vital Signs  ED Triage Vitals [10/16/19 2337]   Temperature Pulse Respirations Blood Pressure SpO2   98 4 °F (36 9 °C) 70 20 142/98 98 %      Temp Source Heart Rate Source Patient Position - Orthostatic VS BP Location FiO2 (%)   Temporal Monitor Sitting Left arm --      Pain Score       6           Vitals:    10/16/19 2337   BP: 142/98   Pulse: 70   Patient Position - Orthostatic VS: Sitting         Visual Acuity      ED Medications  Medications   ketorolac (TORADOL) injection 30 mg (30 mg Intramuscular Given 10/17/19 0001)   amoxicillin-clavulanate (AUGMENTIN) 875-125 mg per tablet 1 tablet (1 tablet Oral Given 10/17/19 0001)       Diagnostic Studies  Results Reviewed     None                 No orders to display              Procedures  Procedures       ED Course                               MDM  Number of Diagnoses or Management Options  Dental infection:   Pain, dental:   Diagnosis management comments: Patient presenting with a dental abscess  There is no evidence to suggest any deeper space infection the oropharynx  He has no trismus  He is not drooling  He is well-appearing and is afebrile  His pain is controlled after giving him a dose of Toradol IM  I placed him on an antibiotic and encouraged him to follow up with dental as soon as tomorrow  I instructed the patient to return immediately if he has any worsening pain, fever, difficulty opening his mouth, inability to tolerate secretions, or any other concern      Risk of Complications, Morbidity, and/or Mortality  Presenting problems: low  Diagnostic procedures: low  Management options: low    Patient Progress  Patient progress: stable      Disposition  Final diagnoses:   Pain, dental   Dental infection     Time reflects when diagnosis was documented in both MDM as applicable and the Disposition within this note     Time User Action Codes Description Comment    10/17/2019 12:08 AM Vamshi Soares Add [K08 89] Pain, dental 10/17/2019 12:08 AM Vicki Vega Add [K04 7] Dental infection       ED Disposition     ED Disposition Condition Date/Time Comment    Discharge Stable Thu Oct 17, 2019 12:08 AM Geejass Jerzy discharge to home/self care  Follow-up Information     Follow up With Specialties Details Why Contact Info    [facility] Schedule an appointment as soon as possible for a visit  Please schedule this as soon as possible to have your dental infection further evaluated  [facility]  [phone]            Discharge Medication List as of 10/17/2019 12:10 AM      START taking these medications    Details   amoxicillin-clavulanate (AUGMENTIN) 875-125 mg per tablet Take 1 tablet by mouth every 12 (twelve) hours for 7 days, Starting Thu 10/17/2019, Until Thu 10/24/2019, Normal      ketorolac (TORADOL) 10 mg tablet Take 1 tablet (10 mg total) by mouth every 6 (six) hours as needed for moderate pain for up to 5 days, Starting Thu 10/17/2019, Until Tue 10/22/2019, Normal         CONTINUE these medications which have NOT CHANGED    Details   buprenorphine-naloxone (SUBOXONE) 8-2 mg per SL tablet Place 1 tablet under the tongue daily, Starting Mon 9/23/2019, Print      cloNIDine (CATAPRES) 0 1 mg tablet Take 1 tablet (0 1 mg total) by mouth 3 (three) times a day, Starting Fri 6/7/2019, Normal      NARCAN 4 MG/0 1ML LIQD instill 1 spray in 1 NOSTRIL if needed for opioid overdose may re     (REFER TO PRESCRIPTION NOTES)  , Historical Med      zolpidem (AMBIEN) 10 mg tablet Take 1 tablet (10 mg total) by mouth daily at bedtime as needed for sleep, Starting Mon 10/14/2019, Normal           No discharge procedures on file      ED Provider  Electronically Signed by           Allison Rodriguez DO  10/17/19 9643

## 2019-10-23 ENCOUNTER — OFFICE VISIT (OUTPATIENT)
Dept: INTERNAL MEDICINE CLINIC | Facility: CLINIC | Age: 24
End: 2019-10-23
Payer: COMMERCIAL

## 2019-10-23 VITALS
WEIGHT: 146 LBS | TEMPERATURE: 97.7 F | DIASTOLIC BLOOD PRESSURE: 60 MMHG | BODY MASS INDEX: 22.91 KG/M2 | SYSTOLIC BLOOD PRESSURE: 110 MMHG | HEIGHT: 67 IN | HEART RATE: 70 BPM | RESPIRATION RATE: 14 BRPM

## 2019-10-23 DIAGNOSIS — Z79.899 DRUG THERAPY CONTINUED: ICD-10-CM

## 2019-10-23 DIAGNOSIS — F19.20 DRUG DEPENDENCE (HCC): Primary | ICD-10-CM

## 2019-10-23 DIAGNOSIS — Z51.81 ENCOUNTER FOR MONITORING SUBOXONE MAINTENANCE THERAPY: ICD-10-CM

## 2019-10-23 DIAGNOSIS — Z13.31 NEGATIVE DEPRESSION SCREENING: ICD-10-CM

## 2019-10-23 DIAGNOSIS — Z79.899 ENCOUNTER FOR MONITORING SUBOXONE MAINTENANCE THERAPY: ICD-10-CM

## 2019-10-23 DIAGNOSIS — F11.10 HEROIN ABUSE (HCC): ICD-10-CM

## 2019-10-23 PROCEDURE — 80307 DRUG TEST PRSMV CHEM ANLYZR: CPT | Performed by: INTERNAL MEDICINE

## 2019-10-23 PROCEDURE — 99213 OFFICE O/P EST LOW 20 MIN: CPT | Performed by: INTERNAL MEDICINE

## 2019-10-23 PROCEDURE — 3008F BODY MASS INDEX DOCD: CPT | Performed by: INTERNAL MEDICINE

## 2019-10-23 RX ORDER — BUPRENORPHINE HYDROCHLORIDE AND NALOXONE HYDROCHLORIDE DIHYDRATE 8; 2 MG/1; MG/1
1 TABLET SUBLINGUAL DAILY
Qty: 30 TABLET | Refills: 0 | Status: SHIPPED | OUTPATIENT
Start: 2019-10-23 | End: 2019-11-18

## 2019-10-23 NOTE — PATIENT INSTRUCTIONS
Buprenorphine/Naloxone (Into the mouth)   Buprenorphine (bue-pre-NOR-feen), Naloxone (nal-OX-one)  Treats narcotic dependence  Brand Name(s): Bunavail, Suboxone, Zubsolv   There may be other brand names for this medicine  When This Medicine Should Not Be Used: This medicine is not right for everyone  Do not use it if you had an allergic reaction to buprenorphine or naloxone  How to Use This Medicine: Thin Sheet, Tablet  · Take your medicine as directed  Your dose may need to be changed several times to find what works best for you  · You must let the medicine dissolve  Never swallow the film or tablet  Your body may not absorb enough of the medicine if you swallow it  · Your health caregiver should show you how to use the medicine  If you do not understand, ask for help  It is important to use the medicine correctly  · Do not talk while the medicine is in your mouth  · Buccal film: Rinse your mouth with water to moisten it  Place the film against the inside of your cheek  If your doctor told you to use more than 1 film, place the second film inside your other cheek  Do not place more than 2 films inside of 1 cheek at a time  Do not move or touch the film  Do not eat or drink anything until the film is completely dissolved  · Sublingual tablet: Place the tablet under your tongue  If your doctor told you to use more than 1 tablet, place all of the tablets in different places under your tongue at the same time  You can use 2 tablets at a time until you have taken all of the medicine, if that is easier for you  Let the tablets dissolve completely in your mouth  Do not eat or drink anything until the tablets are completely dissolved  · Sublingual film: Drink some water to help moisten your mouth  Place the film under your tongue  If your doctor told you to use more than 1 film, place the second film on the opposite side from the first one  Do not move the film after you place it under your tongue   If you are supposed to use more than 2 films, use them the same way, but do not start until the first 2 films are completely dissolved  · Do not break, crush, chew, or cut the film or tablet  · This medicine should come with a Medication Guide  Ask your pharmacist for a copy if you do not have one  · Missed dose: Take a dose as soon as you remember  If it is almost time for your next dose, wait until then and take a regular dose  Do not take extra medicine to make up for a missed dose  · Store the medicine in a closed container at room temperature, away from heat, moisture, and direct light  Ask your pharmacist about the best way to dispose of medicine you do not use  Drugs and Foods to Avoid:   Ask your doctor or pharmacist before using any other medicine, including over-the-counter medicines, vitamins, and herbal products  · Do not use this medicine if you are using or have used an MAO inhibitor within the past 14 days  · Some medicines can affect how buprenorphine/naloxone works  Tell your doctor if you are using the following:   ¨ Carbamazepine, erythromycin, ketoconazole, mirtazapine, phenobarbital, phenytoin, rifampin, tramadol, or trazodone  ¨ Medicine to treat depression  ¨ Medicine to treat HIV/AIDS (including atazanavir, delavirdine, efavirenz, etravirine, nevirapine, ritonavir)  ¨ Phenothiazine medicine  · Do not drink alcohol while you are using this medicine  · Tell your doctor if you use anything else that makes you sleepy  Some examples are allergy medicine, narcotic pain medicine, and alcohol  Tell your doctor if you are also using butorphanol, nalbuphine, pentazocine, or a muscle relaxer    Warnings While Using This Medicine:   · Tell your doctor if you are pregnant or breastfeeding, or if you have kidney disease, liver disease (including hepatitis), lung or breathing problems, adrenal gland problems, an enlarged prostate, trouble urinating, gallbladder problems, low thyroid levels, stomach problems, or a history of depression, brain tumor, head injury, alcohol or drug abuse  · This medicine may cause the following problems:  ¨ High risk of overdose, which can lead to death  ¨ Respiratory depression (serious breathing problem that can be life-threatening)  ¨ Liver problems  ¨ Serotonin syndrome, when used with certain medicines  · This medicine may make you dizzy or drowsy  Do not drive or do anything that could be dangerous until you know how this medicine affects you  Stand or sit up slowly if you feel lightheaded or dizzy  · Tell any doctor or dentist who treats you that you are using this medicine  · This medicine can be habit-forming  Do not use more than your prescribed dose  Call your doctor if you think your medicine is not working  · Do not stop using this medicine suddenly  Your doctor will need to slowly decrease your dose before you stop it completely  · This medicine could cause infertility  Talk with your doctor before using this medicine if you plan to have children  · Keep all medicine out of the reach of children  Never share your medicine with anyone    Possible Side Effects While Using This Medicine:   Call your doctor right away if you notice any of these side effects:  · Allergic reaction: Itching or hives, swelling in your face or hands, swelling or tingling in your mouth or throat, chest tightness, trouble breathing  · Blue lips, fingernails, or skin  · Dark urine or pale stools, nausea, vomiting, loss of appetite, stomach pain, yellow skin or eyes  · Extreme dizziness or weakness, shallow breathing, sweating, seizures, cold or clammy skin  · Severe confusion, lightheadedness, dizziness, or fainting  · Trouble breathing or slow breathing  If you notice these less serious side effects, talk with your doctor:   · Headache, trouble sleeping  · Constipation or upset stomach  · Shaking, feeling hot or cold, runny nose, watery eyes, diarrhea, vomiting, and muscle aches  If you notice other side effects that you think are caused by this medicine, tell your doctor  Call your doctor for medical advice about side effects  You may report side effects to FDA at 7-262-SIB-3971  © 2017 [address] [employer] Information is for End User's use only and may not be sold, redistributed or otherwise used for commercial purposes  The above information is an  only  It is not intended as medical advice for individual conditions or treatments  Talk to your doctor, nurse or pharmacist before following any medical regimen to see if it is safe and effective for you

## 2019-10-23 NOTE — PROGRESS NOTES
Assessment/Plan:  Problem List Items Addressed This Visit        Other    Drug dependence ([place] [address] ) - Primary    Relevant Medications    buprenorphine-naloxone (SUBOXONE) 8-2 mg per SL tablet    Other Relevant Orders    Suboxone    Toxicology screen, urine    Encounter for monitoring Suboxone maintenance therapy    Relevant Medications    buprenorphine-naloxone (SUBOXONE) 8-2 mg per SL tablet    Other Relevant Orders    Suboxone    Toxicology screen, urine    Drug therapy continued    Relevant Medications    buprenorphine-naloxone (SUBOXONE) 8-2 mg per SL tablet    Other Relevant Orders    Suboxone    Toxicology screen, urine    Heroin abuse ([place] [address] )    Relevant Medications    buprenorphine-naloxone (SUBOXONE) 8-2 mg per SL tablet    Other Relevant Orders    Suboxone    Toxicology screen, urine      Other Visit Diagnoses     Negative depression screening        Relevant Medications    buprenorphine-naloxone (SUBOXONE) 8-2 mg per SL tablet           Diagnoses and all orders for this visit:    Drug dependence ([place] [address] )  -     buprenorphine-naloxone (SUBOXONE) 8-2 mg per SL tablet; Place 1 tablet under the tongue daily  -     Suboxone  -     Toxicology screen, urine    Drug therapy continued  -     buprenorphine-naloxone (SUBOXONE) 8-2 mg per SL tablet; Place 1 tablet under the tongue daily  -     Suboxone  -     Toxicology screen, urine    Encounter for monitoring Suboxone maintenance therapy  -     buprenorphine-naloxone (SUBOXONE) 8-2 mg per SL tablet; Place 1 tablet under the tongue daily  -     Suboxone  -     Toxicology screen, urine    Heroin abuse ([place] [address] )  -     buprenorphine-naloxone (SUBOXONE) 8-2 mg per SL tablet; Place 1 tablet under the tongue daily  -     Suboxone  -     Toxicology screen, urine    Negative depression screening  -     buprenorphine-naloxone (SUBOXONE) 8-2 mg per SL tablet; Place 1 tablet under the tongue daily        No problem-specific Assessment & Plan notes found for this encounter        Scheduled Medication Review:  Pt's scheduled medication use was reviewed by myself/staff via the [employer] website  Pt's use has been found to be appropriate w/o any concerns for misuse by the patient  Pt's current conditions require continued scheduled medication use at this time  Future review for continued appropriate medication use and misuse will continue  A/P: Doing well and will continue 8 mg tabs, dose 8/6 and get into  Counseling  Attempt to wean next visit  Reminded to keep meds safe and out of reach of children  RTC 4 weeks  Subjective: WM presents for f/u suboxone  Doing well and no c/o's, but some increase pain due to recent dental extractions  Not using and no withdraw  Does not attend counseling, but goes to  mtg  UDT obtained today  Tolerating the meds and no side effects  Patient ID: Krystle Deleon is a 30 y o  male  HPI    The following portions of the patient's history were reviewed and updated as appropriate:   He has a past medical history of Bipolar 2 disorder ([facility] [address] ), MAURICE (generalized anxiety disorder) (10/10/2018), Heroin abuse ([facility] ) (10/10/2018), and Manic behavior ([facility] )  ,  does not have any pertinent problems on file  ,   has a past surgical history that includes Mandible fracture surgery and [place] tooth extraction  ,  family history includes Hepatitis in his father; Multiple sclerosis in his mother; Seizures in his mother  ,   reports that he has quit smoking  He has a 3 00 pack-year smoking history  He has quit using smokeless tobacco   His smokeless tobacco use included chew  He reports that he has current or past drug history  Drugs: Amphetamines, Cocaine, Hydrocodone, Heroin, Marijuana, and Oxycodone  He reports that he does not drink alcohol ,  is allergic to topamax [topiramate]     Current Outpatient Medications   Medication Sig Dispense Refill    amoxicillin-clavulanate (AUGMENTIN) 875-125 mg per tablet Take 1 tablet by mouth every 12 (twelve) hours for 7 days 14 tablet 0  buprenorphine-naloxone (SUBOXONE) 8-2 mg per SL tablet Place 1 tablet under the tongue daily 30 tablet 0    cloNIDine (CATAPRES) 0 1 mg tablet Take 1 tablet (0 1 mg total) by mouth 3 (three) times a day 90 tablet 5    NARCAN 4 MG/0 1ML LIQD instill 1 spray in 1 NOSTRIL if needed for opioid overdose may re     (REFER TO PRESCRIPTION NOTES)  0    zolpidem (AMBIEN) 10 mg tablet Take 1 tablet (10 mg total) by mouth daily at bedtime as needed for sleep 30 tablet 0    ketorolac (TORADOL) 10 mg tablet Take 1 tablet (10 mg total) by mouth every 6 (six) hours as needed for moderate pain for up to 5 days 20 tablet 0     No current facility-administered medications for this visit  Review of Systems   Constitutional: Negative for activity change, chills, diaphoresis, fatigue and fever  Respiratory: Negative for cough, chest tightness, shortness of breath and wheezing  Cardiovascular: Negative for chest pain, palpitations and leg swelling  Gastrointestinal: Negative for abdominal pain, constipation, diarrhea, nausea and vomiting  Genitourinary: Negative for difficulty urinating, dysuria and frequency  Musculoskeletal: Negative for arthralgias, gait problem and myalgias  Neurological: Negative for dizziness, seizures, syncope, weakness, light-headedness and headaches  Psychiatric/Behavioral: Negative for confusion, dysphoric mood, self-injury, sleep disturbance and suicidal ideas  The patient is not nervous/anxious  PHQ-9 Depression Screening    PHQ-9:    Frequency of the following problems over the past two weeks:       Little interest or pleasure in doing things:  0 - not at all  Feeling down, depressed, or hopeless:  0 - not at all  PHQ-2 Score:  0        Objective:  Vitals:    10/23/19 0858   BP: 110/60   Pulse: 70   Resp: 14   Temp: 97 7 °F (36 5 °C)   TempSrc: Tympanic   Weight: 66 2 kg (146 lb)   Height: 5' 7" (1 702 m)     Body mass index is 22 87 kg/m²       Physical Exam Constitutional: He is oriented to person, place, and time  He appears well-developed and well-nourished  No distress  HENT:   Head: Normocephalic and atraumatic  Mouth/Throat: Oropharynx is clear and moist    Eyes: Pupils are equal, round, and reactive to light  Conjunctivae and EOM are normal    Cardiovascular: Normal rate, regular rhythm and normal heart sounds  Pulmonary/Chest: Effort normal and breath sounds normal  No respiratory distress  He has no wheezes  He has no rales  Abdominal: Soft  Bowel sounds are normal  He exhibits no distension  There is no tenderness  Neurological: He is alert and oriented to person, place, and time  Psychiatric: He has a normal mood and affect  His behavior is normal  Judgment and thought content normal    Nursing note and vitals reviewed

## 2019-10-28 LAB
BUPRENORPHINE UR CFM-MCNC: 124 NG/ML
BUPRENORPHINE UR QL CFM: NORMAL NG/ML
BUPRENORPHINE UR QL CFM: POSITIVE
BUPRENORPHINE+NOR UR QL: POSITIVE
NORBUPRENORPHINE UR CFM-MCNC: 404 NG/ML
NORBUPRENORPHINE UR QL CFM: POSITIVE

## 2019-11-18 ENCOUNTER — OFFICE VISIT (OUTPATIENT)
Dept: INTERNAL MEDICINE CLINIC | Facility: CLINIC | Age: 24
End: 2019-11-18
Payer: COMMERCIAL

## 2019-11-18 VITALS
HEART RATE: 72 BPM | DIASTOLIC BLOOD PRESSURE: 68 MMHG | TEMPERATURE: 97.4 F | SYSTOLIC BLOOD PRESSURE: 112 MMHG | BODY MASS INDEX: 22.44 KG/M2 | HEIGHT: 67 IN | WEIGHT: 143 LBS | RESPIRATION RATE: 12 BRPM

## 2019-11-18 DIAGNOSIS — Z51.81 ENCOUNTER FOR MONITORING SUBOXONE MAINTENANCE THERAPY: ICD-10-CM

## 2019-11-18 DIAGNOSIS — F19.20 DRUG DEPENDENCE (HCC): Primary | ICD-10-CM

## 2019-11-18 DIAGNOSIS — Z79.899 DRUG THERAPY CONTINUED: ICD-10-CM

## 2019-11-18 DIAGNOSIS — Z13.31 NEGATIVE DEPRESSION SCREENING: ICD-10-CM

## 2019-11-18 DIAGNOSIS — F19.10 IV DRUG ABUSE (HCC): ICD-10-CM

## 2019-11-18 DIAGNOSIS — F11.10 HEROIN ABUSE (HCC): ICD-10-CM

## 2019-11-18 DIAGNOSIS — Z79.899 ENCOUNTER FOR MONITORING SUBOXONE MAINTENANCE THERAPY: ICD-10-CM

## 2019-11-18 PROCEDURE — 1036F TOBACCO NON-USER: CPT | Performed by: INTERNAL MEDICINE

## 2019-11-18 PROCEDURE — 80307 DRUG TEST PRSMV CHEM ANLYZR: CPT | Performed by: INTERNAL MEDICINE

## 2019-11-18 PROCEDURE — 99213 OFFICE O/P EST LOW 20 MIN: CPT | Performed by: INTERNAL MEDICINE

## 2019-11-18 RX ORDER — BUPRENORPHINE HYDROCHLORIDE AND NALOXONE HYDROCHLORIDE DIHYDRATE 8; 2 MG/1; MG/1
TABLET SUBLINGUAL
Qty: 23 TABLET | Refills: 0 | Status: SHIPPED | OUTPATIENT
Start: 2019-11-18 | End: 2019-12-10 | Stop reason: SDUPTHER

## 2019-11-18 NOTE — PATIENT INSTRUCTIONS
Buprenorphine/Naloxone (Into the mouth)   Buprenorphine (bue-pre-NOR-feen), Naloxone (nal-OX-one)  Treats narcotic dependence  Brand Name(s): Bunavail, Suboxone, Zubsolv   There may be other brand names for this medicine  When This Medicine Should Not Be Used: This medicine is not right for everyone  Do not use it if you had an allergic reaction to buprenorphine or naloxone  How to Use This Medicine: Thin Sheet, Tablet  · Take your medicine as directed  Your dose may need to be changed several times to find what works best for you  · You must let the medicine dissolve  Never swallow the film or tablet  Your body may not absorb enough of the medicine if you swallow it  · Your health caregiver should show you how to use the medicine  If you do not understand, ask for help  It is important to use the medicine correctly  · Do not talk while the medicine is in your mouth  · Buccal film: Rinse your mouth with water to moisten it  Place the film against the inside of your cheek  If your doctor told you to use more than 1 film, place the second film inside your other cheek  Do not place more than 2 films inside of 1 cheek at a time  Do not move or touch the film  Do not eat or drink anything until the film is completely dissolved  · Sublingual tablet: Place the tablet under your tongue  If your doctor told you to use more than 1 tablet, place all of the tablets in different places under your tongue at the same time  You can use 2 tablets at a time until you have taken all of the medicine, if that is easier for you  Let the tablets dissolve completely in your mouth  Do not eat or drink anything until the tablets are completely dissolved  · Sublingual film: Drink some water to help moisten your mouth  Place the film under your tongue  If your doctor told you to use more than 1 film, place the second film on the opposite side from the first one  Do not move the film after you place it under your tongue   If you are supposed to use more than 2 films, use them the same way, but do not start until the first 2 films are completely dissolved  · Do not break, crush, chew, or cut the film or tablet  · This medicine should come with a Medication Guide  Ask your pharmacist for a copy if you do not have one  · Missed dose: Take a dose as soon as you remember  If it is almost time for your next dose, wait until then and take a regular dose  Do not take extra medicine to make up for a missed dose  · Store the medicine in a closed container at room temperature, away from heat, moisture, and direct light  Ask your pharmacist about the best way to dispose of medicine you do not use  Drugs and Foods to Avoid:   Ask your doctor or pharmacist before using any other medicine, including over-the-counter medicines, vitamins, and herbal products  · Do not use this medicine if you are using or have used an MAO inhibitor within the past 14 days  · Some medicines can affect how buprenorphine/naloxone works  Tell your doctor if you are using the following:   ¨ Carbamazepine, erythromycin, ketoconazole, mirtazapine, phenobarbital, phenytoin, rifampin, tramadol, or trazodone  ¨ Medicine to treat depression  ¨ Medicine to treat HIV/AIDS (including atazanavir, delavirdine, efavirenz, etravirine, nevirapine, ritonavir)  ¨ Phenothiazine medicine  · Do not drink alcohol while you are using this medicine  · Tell your doctor if you use anything else that makes you sleepy  Some examples are allergy medicine, narcotic pain medicine, and alcohol  Tell your doctor if you are also using butorphanol, nalbuphine, pentazocine, or a muscle relaxer    Warnings While Using This Medicine:   · Tell your doctor if you are pregnant or breastfeeding, or if you have kidney disease, liver disease (including hepatitis), lung or breathing problems, adrenal gland problems, an enlarged prostate, trouble urinating, gallbladder problems, low thyroid levels, stomach problems, or a history of depression, brain tumor, head injury, alcohol or drug abuse  · This medicine may cause the following problems:  ¨ High risk of overdose, which can lead to death  ¨ Respiratory depression (serious breathing problem that can be life-threatening)  ¨ Liver problems  ¨ Serotonin syndrome, when used with certain medicines  · This medicine may make you dizzy or drowsy  Do not drive or do anything that could be dangerous until you know how this medicine affects you  Stand or sit up slowly if you feel lightheaded or dizzy  · Tell any doctor or dentist who treats you that you are using this medicine  · This medicine can be habit-forming  Do not use more than your prescribed dose  Call your doctor if you think your medicine is not working  · Do not stop using this medicine suddenly  Your doctor will need to slowly decrease your dose before you stop it completely  · This medicine could cause infertility  Talk with your doctor before using this medicine if you plan to have children  · Keep all medicine out of the reach of children  Never share your medicine with anyone    Possible Side Effects While Using This Medicine:   Call your doctor right away if you notice any of these side effects:  · Allergic reaction: Itching or hives, swelling in your face or hands, swelling or tingling in your mouth or throat, chest tightness, trouble breathing  · Blue lips, fingernails, or skin  · Dark urine or pale stools, nausea, vomiting, loss of appetite, stomach pain, yellow skin or eyes  · Extreme dizziness or weakness, shallow breathing, sweating, seizures, cold or clammy skin  · Severe confusion, lightheadedness, dizziness, or fainting  · Trouble breathing or slow breathing  If you notice these less serious side effects, talk with your doctor:   · Headache, trouble sleeping  · Constipation or upset stomach  · Shaking, feeling hot or cold, runny nose, watery eyes, diarrhea, vomiting, and muscle aches  If you notice other side effects that you think are caused by this medicine, tell your doctor  Call your doctor for medical advice about side effects  You may report side effects to FDA at 7-777-UMB-8803  © 2017 [address] [employer] Information is for End User's use only and may not be sold, redistributed or otherwise used for commercial purposes  The above information is an  only  It is not intended as medical advice for individual conditions or treatments  Talk to your doctor, nurse or pharmacist before following any medical regimen to see if it is safe and effective for you

## 2019-11-18 NOTE — PROGRESS NOTES
Assessment/Plan:  Problem List Items Addressed This Visit        Other    Drug dependence ([place] [address] ) - Primary    Relevant Medications    buprenorphine-naloxone (SUBOXONE) 8-2 mg per SL tablet    Other Relevant Orders    Toxicology screen, urine    Suboxone    Encounter for monitoring Suboxone maintenance therapy    Relevant Medications    buprenorphine-naloxone (SUBOXONE) 8-2 mg per SL tablet    Other Relevant Orders    Toxicology screen, urine    Suboxone    Drug therapy continued    Relevant Medications    buprenorphine-naloxone (SUBOXONE) 8-2 mg per SL tablet    Other Relevant Orders    Toxicology screen, urine    Suboxone    Heroin abuse ([place] [address] )    Relevant Medications    buprenorphine-naloxone (SUBOXONE) 8-2 mg per SL tablet    Other Relevant Orders    Toxicology screen, urine    Suboxone    IV drug abuse ([place] [address] )    Relevant Medications    buprenorphine-naloxone (SUBOXONE) 8-2 mg per SL tablet    Other Relevant Orders    Toxicology screen, urine    Suboxone      Other Visit Diagnoses     Negative depression screening               Diagnoses and all orders for this visit:    Drug dependence ([place] [address] )  -     Toxicology screen, urine  -     Suboxone  -     buprenorphine-naloxone (SUBOXONE) 8-2 mg per SL tablet; Three quarters of a tab sl q day  Drug therapy continued  -     Toxicology screen, urine  -     Suboxone  -     buprenorphine-naloxone (SUBOXONE) 8-2 mg per SL tablet; Three quarters of a tab sl q day  Encounter for monitoring Suboxone maintenance therapy  -     Toxicology screen, urine  -     Suboxone  -     buprenorphine-naloxone (SUBOXONE) 8-2 mg per SL tablet; Three quarters of a tab sl q day  Heroin abuse ([place] [address] )  -     Toxicology screen, urine  -     Suboxone  -     buprenorphine-naloxone (SUBOXONE) 8-2 mg per SL tablet; Three quarters of a tab sl q day  IV drug abuse ([place] [address] )  -     Toxicology screen, urine  -     Suboxone  -     buprenorphine-naloxone (SUBOXONE) 8-2 mg per SL tablet;  Three quarters of a tab sl q day  Negative depression screening        No problem-specific Assessment & Plan notes found for this encounter  Scheduled Medication Review:  Pt's scheduled medication use was reviewed by myself/staff via the [employer] website  Pt's use has been found to be appropriate w/o any concerns for misuse by the patient  Pt's current conditions require continued scheduled medication use at this time  Future review for continued appropriate medication use and misuse will continue  A/P: Doing well and will wean to 6 mg tabs, dose 1/6 and continue with  counseling  Reminded to keep meds safe and out of reach of children  RTC 4 weeks  Subjective: WM presents for f/u suboxone  Doing well and no c/o's  Not using and no withdraw  Does attend counseling and NA  UDT obtained today  Tolerating the meds and no side effects  Patient ID: Michael Mendoza is a 30 y o  male  HPI    The following portions of the patient's history were reviewed and updated as appropriate:   He has a past medical history of Bipolar 2 disorder ([facility] ), MAURICE (generalized anxiety disorder) (10/10/2018), Heroin abuse ([facility] ) (10/10/2018), and Manic behavior ([facility] )  ,  does not have any pertinent problems on file  ,   has a past surgical history that includes Mandible fracture surgery and [place] tooth extraction  ,  family history includes Hepatitis in his father; Multiple sclerosis in his mother; Seizures in his mother  ,   reports that he has quit smoking  He has a 3 00 pack-year smoking history  He has quit using smokeless tobacco   His smokeless tobacco use included chew  He reports that he has current or past drug history  Drugs: Amphetamines, Cocaine, Hydrocodone, Heroin, Marijuana, and Oxycodone  He reports that he does not drink alcohol ,  is allergic to topamax [topiramate]     Current Outpatient Medications   Medication Sig Dispense Refill    cloNIDine (CATAPRES) 0 1 mg tablet Take 1 tablet (0 1 mg total) by mouth 3 (three) times a day 90 tablet 5    NARCAN 4 MG/0 1ML LIQD instill 1 spray in 1 NOSTRIL if needed for opioid overdose may re     (REFER TO PRESCRIPTION NOTES)  0    zolpidem (AMBIEN) 10 mg tablet Take 1 tablet (10 mg total) by mouth daily at bedtime as needed for sleep 30 tablet 0    buprenorphine-naloxone (SUBOXONE) 8-2 mg per SL tablet Three quarters of a tab sl q day  23 tablet 0    ketorolac (TORADOL) 10 mg tablet Take 1 tablet (10 mg total) by mouth every 6 (six) hours as needed for moderate pain for up to 5 days 20 tablet 0     No current facility-administered medications for this visit  Review of Systems   Constitutional: Negative for activity change, chills, diaphoresis, fatigue and fever  Respiratory: Negative for cough, chest tightness, shortness of breath and wheezing  Cardiovascular: Negative for chest pain, palpitations and leg swelling  Gastrointestinal: Negative for abdominal pain, constipation, diarrhea, nausea and vomiting  Genitourinary: Negative for difficulty urinating, dysuria and frequency  Musculoskeletal: Negative for arthralgias, gait problem and myalgias  Neurological: Negative for dizziness, seizures, syncope, weakness, light-headedness and headaches  Psychiatric/Behavioral: Negative for confusion, dysphoric mood, self-injury, sleep disturbance and suicidal ideas  The patient is not nervous/anxious  PHQ-9 Depression Screening    PHQ-9:    Frequency of the following problems over the past two weeks:       Little interest or pleasure in doing things:  0 - not at all  Feeling down, depressed, or hopeless:  0 - not at all  PHQ-2 Score:  0        Objective:  Vitals:    11/18/19 1059   BP: 112/68   Pulse: 72   Resp: 12   Temp: (!) 97 4 °F (36 3 °C)   TempSrc: Tympanic   Weight: 64 9 kg (143 lb)   Height: 5' 7" (1 702 m)     Body mass index is 22 4 kg/m²  Physical Exam   Constitutional: He is oriented to person, place, and time   He appears well-developed and well-nourished  No distress  HENT:   Head: Normocephalic and atraumatic  Mouth/Throat: Oropharynx is clear and moist    Eyes: Pupils are equal, round, and reactive to light  Conjunctivae and EOM are normal    Cardiovascular: Normal rate, regular rhythm and normal heart sounds  Pulmonary/Chest: Effort normal and breath sounds normal  No respiratory distress  He has no wheezes  He has no rales  Abdominal: Soft  Bowel sounds are normal  He exhibits no distension  There is no tenderness  Neurological: He is alert and oriented to person, place, and time  Psychiatric: He has a normal mood and affect  His behavior is normal  Judgment and thought content normal    Nursing note and vitals reviewed

## 2019-11-23 LAB
BUPRENORPHINE UR CFM-MCNC: 40 NG/ML
BUPRENORPHINE UR QL CFM: NORMAL NG/ML
BUPRENORPHINE UR QL CFM: POSITIVE
BUPRENORPHINE+NOR UR QL: POSITIVE
NORBUPRENORPHINE UR CFM-MCNC: 306 NG/ML
NORBUPRENORPHINE UR QL CFM: POSITIVE

## 2019-11-27 DIAGNOSIS — G47.00 INSOMNIA, UNSPECIFIED TYPE: ICD-10-CM

## 2019-11-27 RX ORDER — ZOLPIDEM TARTRATE 10 MG/1
10 TABLET ORAL
Qty: 30 TABLET | Refills: 0 | Status: SHIPPED | OUTPATIENT
Start: 2019-11-27 | End: 2020-01-04 | Stop reason: SDUPTHER

## 2019-11-27 NOTE — TELEPHONE ENCOUNTER
Scheduled Medication Review:  Pt's scheduled medication use was reviewed by myself/staff via the [employer] website  Pt's use has been found to be appropriate w/o any concerns for misuse by the patient  Pt's current conditions require continued scheduled medication use at this time  Future review for continued appropriate medication use and misuse will continue

## 2019-12-10 ENCOUNTER — OFFICE VISIT (OUTPATIENT)
Dept: INTERNAL MEDICINE CLINIC | Facility: CLINIC | Age: 24
End: 2019-12-10
Payer: COMMERCIAL

## 2019-12-10 VITALS
DIASTOLIC BLOOD PRESSURE: 60 MMHG | HEIGHT: 67 IN | HEART RATE: 75 BPM | WEIGHT: 143 LBS | OXYGEN SATURATION: 100 % | SYSTOLIC BLOOD PRESSURE: 110 MMHG | BODY MASS INDEX: 22.44 KG/M2 | TEMPERATURE: 97.5 F

## 2019-12-10 DIAGNOSIS — F19.10 IV DRUG ABUSE (HCC): ICD-10-CM

## 2019-12-10 DIAGNOSIS — F19.20 DRUG DEPENDENCE (HCC): Primary | ICD-10-CM

## 2019-12-10 DIAGNOSIS — F11.10 HEROIN ABUSE (HCC): ICD-10-CM

## 2019-12-10 DIAGNOSIS — Z79.899 DRUG THERAPY CONTINUED: ICD-10-CM

## 2019-12-10 DIAGNOSIS — Z79.899 ENCOUNTER FOR MONITORING SUBOXONE MAINTENANCE THERAPY: ICD-10-CM

## 2019-12-10 DIAGNOSIS — Z51.81 ENCOUNTER FOR MONITORING SUBOXONE MAINTENANCE THERAPY: ICD-10-CM

## 2019-12-10 DIAGNOSIS — Z13.31 NEGATIVE DEPRESSION SCREENING: ICD-10-CM

## 2019-12-10 PROCEDURE — 99213 OFFICE O/P EST LOW 20 MIN: CPT | Performed by: INTERNAL MEDICINE

## 2019-12-10 PROCEDURE — 80307 DRUG TEST PRSMV CHEM ANLYZR: CPT | Performed by: INTERNAL MEDICINE

## 2019-12-10 PROCEDURE — 3008F BODY MASS INDEX DOCD: CPT | Performed by: INTERNAL MEDICINE

## 2019-12-10 PROCEDURE — 1036F TOBACCO NON-USER: CPT | Performed by: INTERNAL MEDICINE

## 2019-12-10 RX ORDER — BUPRENORPHINE HYDROCHLORIDE AND NALOXONE HYDROCHLORIDE DIHYDRATE 8; 2 MG/1; MG/1
TABLET SUBLINGUAL
Qty: 23 TABLET | Refills: 0 | Status: SHIPPED | OUTPATIENT
Start: 2019-12-10 | End: 2020-01-07 | Stop reason: SDUPTHER

## 2019-12-10 NOTE — PATIENT INSTRUCTIONS
Buprenorphine/Naloxone (Into the mouth)   Buprenorphine (bue-pre-NOR-feen), Naloxone (nal-OX-one)  Treats narcotic dependence  Brand Name(s): Bunavail, Suboxone, Zubsolv   There may be other brand names for this medicine  When This Medicine Should Not Be Used: This medicine is not right for everyone  Do not use it if you had an allergic reaction to buprenorphine or naloxone  How to Use This Medicine: Thin Sheet, Tablet  · Take your medicine as directed  Your dose may need to be changed several times to find what works best for you  · You must let the medicine dissolve  Never swallow the film or tablet  Your body may not absorb enough of the medicine if you swallow it  · Your health caregiver should show you how to use the medicine  If you do not understand, ask for help  It is important to use the medicine correctly  · Do not talk while the medicine is in your mouth  · Buccal film: Rinse your mouth with water to moisten it  Place the film against the inside of your cheek  If your doctor told you to use more than 1 film, place the second film inside your other cheek  Do not place more than 2 films inside of 1 cheek at a time  Do not move or touch the film  Do not eat or drink anything until the film is completely dissolved  · Sublingual tablet: Place the tablet under your tongue  If your doctor told you to use more than 1 tablet, place all of the tablets in different places under your tongue at the same time  You can use 2 tablets at a time until you have taken all of the medicine, if that is easier for you  Let the tablets dissolve completely in your mouth  Do not eat or drink anything until the tablets are completely dissolved  · Sublingual film: Drink some water to help moisten your mouth  Place the film under your tongue  If your doctor told you to use more than 1 film, place the second film on the opposite side from the first one  Do not move the film after you place it under your tongue   If you are supposed to use more than 2 films, use them the same way, but do not start until the first 2 films are completely dissolved  · Do not break, crush, chew, or cut the film or tablet  · This medicine should come with a Medication Guide  Ask your pharmacist for a copy if you do not have one  · Missed dose: Take a dose as soon as you remember  If it is almost time for your next dose, wait until then and take a regular dose  Do not take extra medicine to make up for a missed dose  · Store the medicine in a closed container at room temperature, away from heat, moisture, and direct light  Ask your pharmacist about the best way to dispose of medicine you do not use  Drugs and Foods to Avoid:   Ask your doctor or pharmacist before using any other medicine, including over-the-counter medicines, vitamins, and herbal products  · Do not use this medicine if you are using or have used an MAO inhibitor within the past 14 days  · Some medicines can affect how buprenorphine/naloxone works  Tell your doctor if you are using the following:   ¨ Carbamazepine, erythromycin, ketoconazole, mirtazapine, phenobarbital, phenytoin, rifampin, tramadol, or trazodone  ¨ Medicine to treat depression  ¨ Medicine to treat HIV/AIDS (including atazanavir, delavirdine, efavirenz, etravirine, nevirapine, ritonavir)  ¨ Phenothiazine medicine  · Do not drink alcohol while you are using this medicine  · Tell your doctor if you use anything else that makes you sleepy  Some examples are allergy medicine, narcotic pain medicine, and alcohol  Tell your doctor if you are also using butorphanol, nalbuphine, pentazocine, or a muscle relaxer    Warnings While Using This Medicine:   · Tell your doctor if you are pregnant or breastfeeding, or if you have kidney disease, liver disease (including hepatitis), lung or breathing problems, adrenal gland problems, an enlarged prostate, trouble urinating, gallbladder problems, low thyroid levels, stomach problems, or a history of depression, brain tumor, head injury, alcohol or drug abuse  · This medicine may cause the following problems:  ¨ High risk of overdose, which can lead to death  ¨ Respiratory depression (serious breathing problem that can be life-threatening)  ¨ Liver problems  ¨ Serotonin syndrome, when used with certain medicines  · This medicine may make you dizzy or drowsy  Do not drive or do anything that could be dangerous until you know how this medicine affects you  Stand or sit up slowly if you feel lightheaded or dizzy  · Tell any doctor or dentist who treats you that you are using this medicine  · This medicine can be habit-forming  Do not use more than your prescribed dose  Call your doctor if you think your medicine is not working  · Do not stop using this medicine suddenly  Your doctor will need to slowly decrease your dose before you stop it completely  · This medicine could cause infertility  Talk with your doctor before using this medicine if you plan to have children  · Keep all medicine out of the reach of children  Never share your medicine with anyone    Possible Side Effects While Using This Medicine:   Call your doctor right away if you notice any of these side effects:  · Allergic reaction: Itching or hives, swelling in your face or hands, swelling or tingling in your mouth or throat, chest tightness, trouble breathing  · Blue lips, fingernails, or skin  · Dark urine or pale stools, nausea, vomiting, loss of appetite, stomach pain, yellow skin or eyes  · Extreme dizziness or weakness, shallow breathing, sweating, seizures, cold or clammy skin  · Severe confusion, lightheadedness, dizziness, or fainting  · Trouble breathing or slow breathing  If you notice these less serious side effects, talk with your doctor:   · Headache, trouble sleeping  · Constipation or upset stomach  · Shaking, feeling hot or cold, runny nose, watery eyes, diarrhea, vomiting, and muscle aches  If you notice other side effects that you think are caused by this medicine, tell your doctor  Call your doctor for medical advice about side effects  You may report side effects to FDA at 4-361-DUN-6824  © 2017 [address] [employer] Information is for End User's use only and may not be sold, redistributed or otherwise used for commercial purposes  The above information is an  only  It is not intended as medical advice for individual conditions or treatments  Talk to your doctor, nurse or pharmacist before following any medical regimen to see if it is safe and effective for you

## 2019-12-10 NOTE — PROGRESS NOTES
Assessment/Plan:  Problem List Items Addressed This Visit        Other    Drug dependence ([place] [address] ) - Primary    Relevant Medications    buprenorphine-naloxone (SUBOXONE) 8-2 mg per SL tablet    Other Relevant Orders    Suboxone    Toxicology screen, urine    Encounter for monitoring Suboxone maintenance therapy    Relevant Medications    buprenorphine-naloxone (SUBOXONE) 8-2 mg per SL tablet    Other Relevant Orders    Suboxone    Toxicology screen, urine    Drug therapy continued    Relevant Medications    buprenorphine-naloxone (SUBOXONE) 8-2 mg per SL tablet    Other Relevant Orders    Suboxone    Toxicology screen, urine    Heroin abuse ([place] [address] )    Relevant Medications    buprenorphine-naloxone (SUBOXONE) 8-2 mg per SL tablet    Other Relevant Orders    Suboxone    Toxicology screen, urine    IV drug abuse ([place] [address] )    Relevant Medications    buprenorphine-naloxone (SUBOXONE) 8-2 mg per SL tablet    Other Relevant Orders    Suboxone    Toxicology screen, urine      Other Visit Diagnoses     Negative depression screening        Relevant Orders    Suboxone    Toxicology screen, urine           Diagnoses and all orders for this visit:    Drug dependence ([place] [address] )  -     Suboxone  -     Toxicology screen, urine  -     buprenorphine-naloxone (SUBOXONE) 8-2 mg per SL tablet; Three quarters of a tab sl q day  Drug therapy continued  -     Suboxone  -     Toxicology screen, urine  -     buprenorphine-naloxone (SUBOXONE) 8-2 mg per SL tablet; Three quarters of a tab sl q day  Encounter for monitoring Suboxone maintenance therapy  -     Suboxone  -     Toxicology screen, urine  -     buprenorphine-naloxone (SUBOXONE) 8-2 mg per SL tablet; Three quarters of a tab sl q day  Heroin abuse ([place] [address] )  -     Suboxone  -     Toxicology screen, urine  -     buprenorphine-naloxone (SUBOXONE) 8-2 mg per SL tablet; Three quarters of a tab sl q day      IV drug abuse ([place] [address] )  -     Suboxone  -     Toxicology screen, urine  - buprenorphine-naloxone (SUBOXONE) 8-2 mg per SL tablet; Three quarters of a tab sl q day  Negative depression screening  -     Suboxone  -     Toxicology screen, urine        No problem-specific Assessment & Plan notes found for this encounter  Scheduled Medication Review:  Pt's scheduled medication use was reviewed by myself/staff via the [email or web address] website  Pt's use has been found to be appropriate w/o any concerns for misuse by the patient  Pt's current conditions require continued scheduled medication use at this time  Future review for continued appropriate medication use and misuse will continue  A/P: Doing well and will continue 6 mg tabs, dose 2/6 and continue with  counseling  Reminded to keep meds safe and out of reach of children  RTC 4 weeks  Subjective: WM presents for f/u suboxone  Doing well and no c/o's  Not using and no withdraw  Does attend counseling  UDT obtained today  Tolerating the meds and no side effects  Patient ID: Milli Montes De Oca is a 30 y o  male  HPI    The following portions of the patient's history were reviewed and updated as appropriate:   He has a past medical history of Bipolar 2 disorder ([facility] ), MAURICE (generalized anxiety disorder) (10/10/2018), Heroin abuse ([facility] ) (10/10/2018), and Manic behavior ([facility] )  ,  does not have any pertinent problems on file  ,   has a past surgical history that includes Mandible fracture surgery and [place] tooth extraction  ,  family history includes Hepatitis in his father; Multiple sclerosis in his mother; Seizures in his mother  ,   reports that he has quit smoking  He has a 3 00 pack-year smoking history  He has quit using smokeless tobacco   His smokeless tobacco use included chew  He reports that he has current or past drug history  Drugs: Amphetamines, Cocaine, Hydrocodone, Heroin, Marijuana, and Oxycodone  He reports that he does not drink alcohol ,  is allergic to topamax [topiramate]     Current Outpatient Medications Medication Sig Dispense Refill    buprenorphine-naloxone (SUBOXONE) 8-2 mg per SL tablet Three quarters of a tab sl q day  23 tablet 0    cloNIDine (CATAPRES) 0 1 mg tablet Take 1 tablet (0 1 mg total) by mouth 3 (three) times a day 90 tablet 5    ketorolac (TORADOL) 10 mg tablet Take 1 tablet (10 mg total) by mouth every 6 (six) hours as needed for moderate pain for up to 5 days 20 tablet 0    NARCAN 4 MG/0 1ML LIQD instill 1 spray in 1 NOSTRIL if needed for opioid overdose may re     (REFER TO PRESCRIPTION NOTES)  0    zolpidem (AMBIEN) 10 mg tablet Take 1 tablet (10 mg total) by mouth daily at bedtime as needed for sleep 30 tablet 0     No current facility-administered medications for this visit  Review of Systems   Constitutional: Negative for activity change, chills, diaphoresis, fatigue and fever  Respiratory: Negative for cough, chest tightness, shortness of breath and wheezing  Cardiovascular: Negative for chest pain, palpitations and leg swelling  Gastrointestinal: Negative for abdominal pain, constipation, diarrhea, nausea and vomiting  Genitourinary: Negative for difficulty urinating, dysuria and frequency  Musculoskeletal: Negative for arthralgias, gait problem and myalgias  Neurological: Negative for dizziness, seizures, syncope, weakness, light-headedness and headaches  Psychiatric/Behavioral: Negative for confusion, dysphoric mood, self-injury, sleep disturbance and suicidal ideas  The patient is not nervous/anxious  PHQ-9 Depression Screening    PHQ-9:    Frequency of the following problems over the past two weeks:       Little interest or pleasure in doing things:  0 - not at all  Feeling down, depressed, or hopeless:  0 - not at all  PHQ-2 Score:  0        Objective:  Vitals:    12/10/19 1530   BP: 110/60   Pulse: 75   Temp: 97 5 °F (36 4 °C)   SpO2: 100%   Weight: 64 9 kg (143 lb)   Height: 5' 7" (1 702 m)     Body mass index is 22 4 kg/m²       Physical Exam Constitutional: He is oriented to person, place, and time  He appears well-developed and well-nourished  No distress  HENT:   Head: Normocephalic and atraumatic  Mouth/Throat: Oropharynx is clear and moist    Eyes: Pupils are equal, round, and reactive to light  Conjunctivae and EOM are normal    Cardiovascular: Normal rate, regular rhythm and normal heart sounds  Pulmonary/Chest: Effort normal and breath sounds normal  No respiratory distress  He has no wheezes  He has no rales  Abdominal: Soft  Bowel sounds are normal  He exhibits no distension  There is no tenderness  Neurological: He is alert and oriented to person, place, and time  Psychiatric: He has a normal mood and affect  His behavior is normal  Judgment and thought content normal    Nursing note and vitals reviewed

## 2019-12-15 LAB
BUPRENORPHINE UR CFM-MCNC: 244 NG/ML
BUPRENORPHINE UR QL CFM: NORMAL NG/ML
BUPRENORPHINE UR QL CFM: POSITIVE
BUPRENORPHINE+NOR UR QL: POSITIVE
NORBUPRENORPHINE UR CFM-MCNC: 550 NG/ML
NORBUPRENORPHINE UR QL CFM: POSITIVE

## 2020-01-04 DIAGNOSIS — I10 ESSENTIAL HYPERTENSION: ICD-10-CM

## 2020-01-04 DIAGNOSIS — G47.00 INSOMNIA, UNSPECIFIED TYPE: ICD-10-CM

## 2020-01-05 RX ORDER — CLONIDINE HYDROCHLORIDE 0.1 MG/1
0.1 TABLET ORAL 3 TIMES DAILY
Qty: 90 TABLET | Refills: 0 | Status: SHIPPED | OUTPATIENT
Start: 2020-01-05 | End: 2020-02-03 | Stop reason: SDUPTHER

## 2020-01-05 RX ORDER — ZOLPIDEM TARTRATE 10 MG/1
10 TABLET ORAL
Qty: 30 TABLET | Refills: 0 | Status: SHIPPED | OUTPATIENT
Start: 2020-01-05 | End: 2020-02-03 | Stop reason: SDUPTHER

## 2020-01-05 NOTE — TELEPHONE ENCOUNTER
Scheduled Medication Review:  Pt's scheduled medication use was reviewed by myself/staff via the [employer] website  Pt's use has been found to be appropriate w/o any concerns for misuse by the patient  Pt's current conditions require continued scheduled medication use at this time  Future review for continued appropriate medication use and misuse will continue

## 2020-01-07 ENCOUNTER — OFFICE VISIT (OUTPATIENT)
Dept: INTERNAL MEDICINE CLINIC | Facility: CLINIC | Age: 25
End: 2020-01-07
Payer: COMMERCIAL

## 2020-01-07 VITALS
BODY MASS INDEX: 22.22 KG/M2 | HEART RATE: 78 BPM | WEIGHT: 141.6 LBS | SYSTOLIC BLOOD PRESSURE: 100 MMHG | RESPIRATION RATE: 16 BRPM | HEIGHT: 67 IN | TEMPERATURE: 98 F | DIASTOLIC BLOOD PRESSURE: 60 MMHG | OXYGEN SATURATION: 97 %

## 2020-01-07 DIAGNOSIS — Z79.899 ENCOUNTER FOR MONITORING SUBOXONE MAINTENANCE THERAPY: ICD-10-CM

## 2020-01-07 DIAGNOSIS — Z79.899 DRUG THERAPY CONTINUED: ICD-10-CM

## 2020-01-07 DIAGNOSIS — F19.10 IV DRUG ABUSE (HCC): ICD-10-CM

## 2020-01-07 DIAGNOSIS — F11.10 HEROIN ABUSE (HCC): ICD-10-CM

## 2020-01-07 DIAGNOSIS — F19.20 DRUG DEPENDENCE (HCC): Primary | ICD-10-CM

## 2020-01-07 DIAGNOSIS — Z51.81 ENCOUNTER FOR MONITORING SUBOXONE MAINTENANCE THERAPY: ICD-10-CM

## 2020-01-07 PROCEDURE — 80307 DRUG TEST PRSMV CHEM ANLYZR: CPT | Performed by: INTERNAL MEDICINE

## 2020-01-07 PROCEDURE — 3008F BODY MASS INDEX DOCD: CPT | Performed by: INTERNAL MEDICINE

## 2020-01-07 PROCEDURE — 99213 OFFICE O/P EST LOW 20 MIN: CPT | Performed by: INTERNAL MEDICINE

## 2020-01-07 RX ORDER — BUPRENORPHINE HYDROCHLORIDE AND NALOXONE HYDROCHLORIDE DIHYDRATE 8; 2 MG/1; MG/1
TABLET SUBLINGUAL
Qty: 23 TABLET | Refills: 0 | Status: SHIPPED | OUTPATIENT
Start: 2020-01-07 | End: 2020-02-03 | Stop reason: SDUPTHER

## 2020-01-07 NOTE — PATIENT INSTRUCTIONS
Buprenorphine/Naloxone (Into the mouth)   Buprenorphine (bue-pre-NOR-feen), Naloxone (nal-OX-one)  Treats narcotic dependence  Brand Name(s): Bunavail, Suboxone, Zubsolv   There may be other brand names for this medicine  When This Medicine Should Not Be Used: This medicine is not right for everyone  Do not use it if you had an allergic reaction to buprenorphine or naloxone  How to Use This Medicine: Thin Sheet, Tablet  · Take your medicine as directed  Your dose may need to be changed several times to find what works best for you  · You must let the medicine dissolve  Never swallow the film or tablet  Your body may not absorb enough of the medicine if you swallow it  · Your health caregiver should show you how to use the medicine  If you do not understand, ask for help  It is important to use the medicine correctly  · Do not talk while the medicine is in your mouth  · Buccal film: Rinse your mouth with water to moisten it  Place the film against the inside of your cheek  If your doctor told you to use more than 1 film, place the second film inside your other cheek  Do not place more than 2 films inside of 1 cheek at a time  Do not move or touch the film  Do not eat or drink anything until the film is completely dissolved  · Sublingual tablet: Place the tablet under your tongue  If your doctor told you to use more than 1 tablet, place all of the tablets in different places under your tongue at the same time  You can use 2 tablets at a time until you have taken all of the medicine, if that is easier for you  Let the tablets dissolve completely in your mouth  Do not eat or drink anything until the tablets are completely dissolved  · Sublingual film: Drink some water to help moisten your mouth  Place the film under your tongue  If your doctor told you to use more than 1 film, place the second film on the opposite side from the first one  Do not move the film after you place it under your tongue   If you are supposed to use more than 2 films, use them the same way, but do not start until the first 2 films are completely dissolved  · Do not break, crush, chew, or cut the film or tablet  · This medicine should come with a Medication Guide  Ask your pharmacist for a copy if you do not have one  · Missed dose: Take a dose as soon as you remember  If it is almost time for your next dose, wait until then and take a regular dose  Do not take extra medicine to make up for a missed dose  · Store the medicine in a closed container at room temperature, away from heat, moisture, and direct light  Ask your pharmacist about the best way to dispose of medicine you do not use  Drugs and Foods to Avoid:   Ask your doctor or pharmacist before using any other medicine, including over-the-counter medicines, vitamins, and herbal products  · Do not use this medicine if you are using or have used an MAO inhibitor within the past 14 days  · Some medicines can affect how buprenorphine/naloxone works  Tell your doctor if you are using the following:   ¨ Carbamazepine, erythromycin, ketoconazole, mirtazapine, phenobarbital, phenytoin, rifampin, tramadol, or trazodone  ¨ Medicine to treat depression  ¨ Medicine to treat HIV/AIDS (including atazanavir, delavirdine, efavirenz, etravirine, nevirapine, ritonavir)  ¨ Phenothiazine medicine  · Do not drink alcohol while you are using this medicine  · Tell your doctor if you use anything else that makes you sleepy  Some examples are allergy medicine, narcotic pain medicine, and alcohol  Tell your doctor if you are also using butorphanol, nalbuphine, pentazocine, or a muscle relaxer    Warnings While Using This Medicine:   · Tell your doctor if you are pregnant or breastfeeding, or if you have kidney disease, liver disease (including hepatitis), lung or breathing problems, adrenal gland problems, an enlarged prostate, trouble urinating, gallbladder problems, low thyroid levels, stomach problems, or a history of depression, brain tumor, head injury, alcohol or drug abuse  · This medicine may cause the following problems:  ¨ High risk of overdose, which can lead to death  ¨ Respiratory depression (serious breathing problem that can be life-threatening)  ¨ Liver problems  ¨ Serotonin syndrome, when used with certain medicines  · This medicine may make you dizzy or drowsy  Do not drive or do anything that could be dangerous until you know how this medicine affects you  Stand or sit up slowly if you feel lightheaded or dizzy  · Tell any doctor or dentist who treats you that you are using this medicine  · This medicine can be habit-forming  Do not use more than your prescribed dose  Call your doctor if you think your medicine is not working  · Do not stop using this medicine suddenly  Your doctor will need to slowly decrease your dose before you stop it completely  · This medicine could cause infertility  Talk with your doctor before using this medicine if you plan to have children  · Keep all medicine out of the reach of children  Never share your medicine with anyone    Possible Side Effects While Using This Medicine:   Call your doctor right away if you notice any of these side effects:  · Allergic reaction: Itching or hives, swelling in your face or hands, swelling or tingling in your mouth or throat, chest tightness, trouble breathing  · Blue lips, fingernails, or skin  · Dark urine or pale stools, nausea, vomiting, loss of appetite, stomach pain, yellow skin or eyes  · Extreme dizziness or weakness, shallow breathing, sweating, seizures, cold or clammy skin  · Severe confusion, lightheadedness, dizziness, or fainting  · Trouble breathing or slow breathing  If you notice these less serious side effects, talk with your doctor:   · Headache, trouble sleeping  · Constipation or upset stomach  · Shaking, feeling hot or cold, runny nose, watery eyes, diarrhea, vomiting, and muscle aches  If you notice other side effects that you think are caused by this medicine, tell your doctor  Call your doctor for medical advice about side effects  You may report side effects to FDA at 2-283-EAY-0743  © 2017 [employer] [employer] Information is for End User's use only and may not be sold, redistributed or otherwise used for commercial purposes  The above information is an  only  It is not intended as medical advice for individual conditions or treatments  Talk to your doctor, nurse or pharmacist before following any medical regimen to see if it is safe and effective for you

## 2020-01-07 NOTE — PROGRESS NOTES
Assessment/Plan:  Problem List Items Addressed This Visit        Other    Drug dependence ([place] [address] ) - Primary    Relevant Medications    buprenorphine-naloxone (SUBOXONE) 8-2 mg per SL tablet    Other Relevant Orders    Toxicology screen, urine    Suboxone    Encounter for monitoring Suboxone maintenance therapy    Relevant Medications    buprenorphine-naloxone (SUBOXONE) 8-2 mg per SL tablet    Other Relevant Orders    Toxicology screen, urine    Suboxone    Drug therapy continued    Relevant Medications    buprenorphine-naloxone (SUBOXONE) 8-2 mg per SL tablet    Other Relevant Orders    Toxicology screen, urine    Suboxone    Heroin abuse ([place] [address] )    Relevant Medications    buprenorphine-naloxone (SUBOXONE) 8-2 mg per SL tablet    Other Relevant Orders    Toxicology screen, urine    Suboxone    IV drug abuse ([place] [address] )    Relevant Medications    buprenorphine-naloxone (SUBOXONE) 8-2 mg per SL tablet    Other Relevant Orders    Toxicology screen, urine    Suboxone           Diagnoses and all orders for this visit:    Drug dependence ([place] [address] )  -     Toxicology screen, urine  -     Suboxone  -     buprenorphine-naloxone (SUBOXONE) 8-2 mg per SL tablet; Three quarters of a tab sl q day  Drug therapy continued  -     Toxicology screen, urine  -     Suboxone  -     buprenorphine-naloxone (SUBOXONE) 8-2 mg per SL tablet; Three quarters of a tab sl q day  Encounter for monitoring Suboxone maintenance therapy  -     Toxicology screen, urine  -     Suboxone  -     buprenorphine-naloxone (SUBOXONE) 8-2 mg per SL tablet; Three quarters of a tab sl q day  Heroin abuse ([place] [address] )  -     Toxicology screen, urine  -     Suboxone  -     buprenorphine-naloxone (SUBOXONE) 8-2 mg per SL tablet; Three quarters of a tab sl q day  IV drug abuse ([place] [address] )  -     Toxicology screen, urine  -     Suboxone  -     buprenorphine-naloxone (SUBOXONE) 8-2 mg per SL tablet; Three quarters of a tab sl q day          No problem-specific Assessment & Plan notes found for this encounter  Scheduled Medication Review:  Pt's scheduled medication use was reviewed by myself/staff via the [employer] website  Pt's use has been found to be appropriate w/o any concerns for misuse by the patient  Pt's current conditions require continued scheduled medication use at this time  Future review for continued appropriate medication use and misuse will continue  A/P: Doing well and will continue 6 mg tabs, dose 3/6 and continue with counseling  Reminded to keep meds safe and out of reach of children  RTC 4 weeks  Subjective: WM presents for f/u suboxone  Doing well and no c/o's  Not using and no withdraw  Does attend counseling  UDT obtained today  Tolerating the meds and no side effects  Patient ID: Argelia Loza is a 30 y o  male  HPI    The following portions of the patient's history were reviewed and updated as appropriate:   He has a past medical history of Bipolar 2 disorder ([facility] ), MAURICE (generalized anxiety disorder) (10/10/2018), Heroin abuse ([facility] ) (10/10/2018), and Manic behavior ([facility] )  ,  does not have any pertinent problems on file  ,   has a past surgical history that includes Mandible fracture surgery and [place] tooth extraction  ,  family history includes Hepatitis in his father; Multiple sclerosis in his mother; Seizures in his mother  ,   reports that he has quit smoking  He has a 3 00 pack-year smoking history  He has quit using smokeless tobacco   His smokeless tobacco use included chew  He reports that he has current or past drug history  Drugs: Amphetamines, Cocaine, Hydrocodone, Heroin, Marijuana, and Oxycodone  He reports that he does not drink alcohol ,  is allergic to topamax [topiramate]     Current Outpatient Medications   Medication Sig Dispense Refill    buprenorphine-naloxone (SUBOXONE) 8-2 mg per SL tablet Three quarters of a tab sl q day   23 tablet 0    cloNIDine (CATAPRES) 0 1 mg tablet Take 1 tablet (0 1 mg total) by mouth 3 (three) times a day 90 tablet 0    NARCAN 4 MG/0 1ML LIQD instill 1 spray in 1 NOSTRIL if needed for opioid overdose may re     (REFER TO PRESCRIPTION NOTES)  0    zolpidem (AMBIEN) 10 mg tablet Take 1 tablet (10 mg total) by mouth daily at bedtime as needed for sleep 30 tablet 0    ketorolac (TORADOL) 10 mg tablet Take 1 tablet (10 mg total) by mouth every 6 (six) hours as needed for moderate pain for up to 5 days 20 tablet 0     No current facility-administered medications for this visit  Review of Systems   Constitutional: Negative for activity change, chills, diaphoresis, fatigue and fever  Respiratory: Negative for cough, chest tightness, shortness of breath and wheezing  Cardiovascular: Negative for chest pain, palpitations and leg swelling  Gastrointestinal: Negative for abdominal pain, constipation, diarrhea, nausea and vomiting  Genitourinary: Negative for difficulty urinating, dysuria and frequency  Musculoskeletal: Negative for arthralgias, gait problem and myalgias  Neurological: Negative for dizziness, seizures, syncope, weakness, light-headedness and headaches  Psychiatric/Behavioral: Negative for confusion, dysphoric mood, self-injury, sleep disturbance and suicidal ideas  The patient is not nervous/anxious  PHQ-9 Depression Screening    PHQ-9:    Frequency of the following problems over the past two weeks:             Objective:  Vitals:    01/07/20 1440   BP: 100/60   BP Location: Left arm   Patient Position: Sitting   Cuff Size: Large   Pulse: 78   Resp: 16   Temp: 98 °F (36 7 °C)   SpO2: 97%   Weight: 64 2 kg (141 lb 9 6 oz)   Height: 5' 7" (1 702 m)     Body mass index is 22 18 kg/m²  Physical Exam   Constitutional: He is oriented to person, place, and time  He appears well-developed and well-nourished  No distress  HENT:   Head: Normocephalic and atraumatic  Mouth/Throat: Oropharynx is clear and moist    Eyes: Pupils are equal, round, and reactive to light  Conjunctivae and EOM are normal    Cardiovascular: Normal rate, regular rhythm and normal heart sounds  Pulmonary/Chest: Effort normal and breath sounds normal  No respiratory distress  He has no wheezes  He has no rales  Abdominal: Soft  Bowel sounds are normal  He exhibits no distension  There is no tenderness  Neurological: He is alert and oriented to person, place, and time  Psychiatric: He has a normal mood and affect  His behavior is normal  Judgment and thought content normal    Nursing note and vitals reviewed

## 2020-01-14 LAB
BUPRENORPHINE UR CFM-MCNC: 76 NG/ML
BUPRENORPHINE UR QL CFM: NORMAL NG/ML
BUPRENORPHINE UR QL CFM: POSITIVE
BUPRENORPHINE+NOR UR QL: POSITIVE
NORBUPRENORPHINE UR CFM-MCNC: 276 NG/ML
NORBUPRENORPHINE UR QL CFM: POSITIVE

## 2020-02-03 ENCOUNTER — OFFICE VISIT (OUTPATIENT)
Dept: INTERNAL MEDICINE CLINIC | Facility: CLINIC | Age: 25
End: 2020-02-03
Payer: COMMERCIAL

## 2020-02-03 VITALS
DIASTOLIC BLOOD PRESSURE: 68 MMHG | RESPIRATION RATE: 18 BRPM | BODY MASS INDEX: 23.07 KG/M2 | HEIGHT: 67 IN | SYSTOLIC BLOOD PRESSURE: 120 MMHG | TEMPERATURE: 97.9 F | HEART RATE: 69 BPM | WEIGHT: 147 LBS | OXYGEN SATURATION: 99 %

## 2020-02-03 DIAGNOSIS — F19.10 IV DRUG ABUSE (HCC): ICD-10-CM

## 2020-02-03 DIAGNOSIS — F11.10 HEROIN ABUSE (HCC): ICD-10-CM

## 2020-02-03 DIAGNOSIS — Z79.899 ENCOUNTER FOR MONITORING SUBOXONE MAINTENANCE THERAPY: ICD-10-CM

## 2020-02-03 DIAGNOSIS — G47.00 INSOMNIA, UNSPECIFIED TYPE: ICD-10-CM

## 2020-02-03 DIAGNOSIS — Z79.899 DRUG THERAPY CONTINUED: ICD-10-CM

## 2020-02-03 DIAGNOSIS — Z51.81 ENCOUNTER FOR MONITORING SUBOXONE MAINTENANCE THERAPY: ICD-10-CM

## 2020-02-03 DIAGNOSIS — I10 ESSENTIAL HYPERTENSION: ICD-10-CM

## 2020-02-03 DIAGNOSIS — F19.20 DRUG DEPENDENCE (HCC): Primary | ICD-10-CM

## 2020-02-03 PROCEDURE — 3078F DIAST BP <80 MM HG: CPT | Performed by: INTERNAL MEDICINE

## 2020-02-03 PROCEDURE — 3008F BODY MASS INDEX DOCD: CPT | Performed by: INTERNAL MEDICINE

## 2020-02-03 PROCEDURE — 3074F SYST BP LT 130 MM HG: CPT | Performed by: INTERNAL MEDICINE

## 2020-02-03 PROCEDURE — 99213 OFFICE O/P EST LOW 20 MIN: CPT | Performed by: INTERNAL MEDICINE

## 2020-02-03 PROCEDURE — 1036F TOBACCO NON-USER: CPT | Performed by: INTERNAL MEDICINE

## 2020-02-03 RX ORDER — CLONIDINE HYDROCHLORIDE 0.1 MG/1
0.1 TABLET ORAL 3 TIMES DAILY
Qty: 90 TABLET | Refills: 0 | Status: SHIPPED | OUTPATIENT
Start: 2020-02-03 | End: 2020-03-10 | Stop reason: SDUPTHER

## 2020-02-03 RX ORDER — BUPRENORPHINE HYDROCHLORIDE AND NALOXONE HYDROCHLORIDE DIHYDRATE 8; 2 MG/1; MG/1
TABLET SUBLINGUAL
Qty: 23 TABLET | Refills: 0 | Status: SHIPPED | OUTPATIENT
Start: 2020-02-03 | End: 2020-02-19 | Stop reason: SDUPTHER

## 2020-02-03 RX ORDER — ZOLPIDEM TARTRATE 10 MG/1
10 TABLET ORAL
Qty: 30 TABLET | Refills: 0 | Status: SHIPPED | OUTPATIENT
Start: 2020-02-03 | End: 2020-03-10 | Stop reason: SDUPTHER

## 2020-02-03 NOTE — TELEPHONE ENCOUNTER
Scheduled Medication Review:  Pt's scheduled medication use was reviewed by myself/staff via the [employer] website  Pt's use has been found to be appropriate w/o any concerns for misuse by the patient  Pt's current conditions require continued scheduled medication use at this time  Future review for continued appropriate medication use and misuse will continue

## 2020-02-03 NOTE — PROGRESS NOTES
Assessment/Plan:  Problem List Items Addressed This Visit        Other    Drug dependence ([place] [address] ) - Primary    Relevant Medications    buprenorphine-naloxone (SUBOXONE) 8-2 mg per SL tablet    Encounter for monitoring Suboxone maintenance therapy    Relevant Medications    buprenorphine-naloxone (SUBOXONE) 8-2 mg per SL tablet    Drug therapy continued    Relevant Medications    buprenorphine-naloxone (SUBOXONE) 8-2 mg per SL tablet    Heroin abuse ([facility])    Relevant Medications    buprenorphine-naloxone (SUBOXONE) 8-2 mg per SL tablet    IV drug abuse ([place] [address] )    Relevant Medications    buprenorphine-naloxone (SUBOXONE) 8-2 mg per SL tablet           Diagnoses and all orders for this visit:    Drug dependence ([place] [address] )  -     buprenorphine-naloxone (SUBOXONE) 8-2 mg per SL tablet; Three quarters of a tab sl q day  Drug therapy continued  -     buprenorphine-naloxone (SUBOXONE) 8-2 mg per SL tablet; Three quarters of a tab sl q day  Encounter for monitoring Suboxone maintenance therapy  -     buprenorphine-naloxone (SUBOXONE) 8-2 mg per SL tablet; Three quarters of a tab sl q day  Heroin abuse ([facility])  -     buprenorphine-naloxone (SUBOXONE) 8-2 mg per SL tablet; Three quarters of a tab sl q day  IV drug abuse ([facility])  -     buprenorphine-naloxone (SUBOXONE) 8-2 mg per SL tablet; Three quarters of a tab sl q day  No problem-specific Assessment & Plan notes found for this encounter  Scheduled Medication Review:  Pt's scheduled medication use was reviewed by myself/staff via the [employer] website  Pt's use has been found to be appropriate w/o any concerns for misuse by the patient  Pt's current conditions require continued scheduled medication use at this time  Future review for continued appropriate medication use and misuse will continue  A/P: Doing well and will continue 6mg tabs, dose 4/6 and get into counseling  Reminded to keep meds safe and out of reach of children  RTC 4 weeks        Subjective:  presents for f/u suboxone  Doing well and no c/o's  Not using and no withdraw  Doesn't attend counseling  UDT not due today  Tolerating the meds and no side effects  Patient ID: Shakira Wylie is a 30 y o  male  HPI    The following portions of the patient's history were reviewed and updated as appropriate:   He has a past medical history of Bipolar 2 disorder ([facility] ), MAURICE (generalized anxiety disorder) (10/10/2018), Heroin abuse ([place] [address] ) (10/10/2018), and Manic behavior ([place] [address] )  ,  does not have any pertinent problems on file  ,   has a past surgical history that includes Mandible fracture surgery and [place] tooth extraction  ,  family history includes Hepatitis in his father; Multiple sclerosis in his mother; Seizures in his mother  ,   reports that he has quit smoking  He has a 3 00 pack-year smoking history  He has quit using smokeless tobacco   His smokeless tobacco use included chew  He reports that he has current or past drug history  Drugs: Amphetamines, Cocaine, Hydrocodone, Heroin, Marijuana, and Oxycodone  He reports that he does not drink alcohol ,  is allergic to topamax [topiramate]     Current Outpatient Medications   Medication Sig Dispense Refill    buprenorphine-naloxone (SUBOXONE) 8-2 mg per SL tablet Three quarters of a tab sl q day  23 tablet 0    cloNIDine (CATAPRES) 0 1 mg tablet Take 1 tablet (0 1 mg total) by mouth 3 (three) times a day 90 tablet 0    NARCAN 4 MG/0 1ML LIQD instill 1 spray in 1 NOSTRIL if needed for opioid overdose may re     (REFER TO PRESCRIPTION NOTES)  0    zolpidem (AMBIEN) 10 mg tablet Take 1 tablet (10 mg total) by mouth daily at bedtime as needed for sleep 30 tablet 0     No current facility-administered medications for this visit  Review of Systems   Constitutional: Negative for activity change, chills, diaphoresis, fatigue and fever  Respiratory: Negative for cough, chest tightness, shortness of breath and wheezing      Cardiovascular: Negative for chest pain, palpitations and leg swelling  Gastrointestinal: Negative for abdominal pain, constipation, diarrhea, nausea and vomiting  Genitourinary: Negative for difficulty urinating, dysuria and frequency  Musculoskeletal: Negative for arthralgias, gait problem and myalgias  Neurological: Negative for dizziness, seizures, syncope, weakness, light-headedness and headaches  Psychiatric/Behavioral: Negative for confusion, dysphoric mood, self-injury, sleep disturbance and suicidal ideas  The patient is not nervous/anxious  PHQ-9 Depression Screening    PHQ-9:    Frequency of the following problems over the past two weeks:             Objective:  Vitals:    02/03/20 1322   BP: 120/68   BP Location: Left arm   Patient Position: Sitting   Cuff Size: Adult   Pulse: 69   Resp: 18   Temp: 97 9 °F (36 6 °C)   TempSrc: Tympanic   SpO2: 99%   Weight: 66 7 kg (147 lb)   Height: 5' 7" (1 702 m)     Body mass index is 23 02 kg/m²  Physical Exam   Constitutional: He is oriented to person, place, and time  He appears well-developed and well-nourished  No distress  HENT:   Head: Normocephalic and atraumatic  Mouth/Throat: Oropharynx is clear and moist    Eyes: Pupils are equal, round, and reactive to light  Conjunctivae and EOM are normal    Cardiovascular: Normal rate, regular rhythm and normal heart sounds  Pulmonary/Chest: Effort normal and breath sounds normal  No respiratory distress  He has no wheezes  He has no rales  Abdominal: Soft  Bowel sounds are normal  He exhibits no distension  There is no tenderness  Neurological: He is alert and oriented to person, place, and time  Psychiatric: He has a normal mood and affect  His behavior is normal  Judgment and thought content normal    Nursing note and vitals reviewed

## 2020-02-03 NOTE — PATIENT INSTRUCTIONS
Buprenorphine/Naloxone (Into the mouth)   Buprenorphine (bue-pre-NOR-feen), Naloxone (nal-OX-one)  Treats narcotic dependence  Brand Name(s): Bunavail, Suboxone, Zubsolv   There may be other brand names for this medicine  When This Medicine Should Not Be Used: This medicine is not right for everyone  Do not use it if you had an allergic reaction to buprenorphine or naloxone  How to Use This Medicine: Thin Sheet, Tablet  · Take your medicine as directed  Your dose may need to be changed several times to find what works best for you  · You must let the medicine dissolve  Never swallow the film or tablet  Your body may not absorb enough of the medicine if you swallow it  · Your health caregiver should show you how to use the medicine  If you do not understand, ask for help  It is important to use the medicine correctly  · Do not talk while the medicine is in your mouth  · Buccal film: Rinse your mouth with water to moisten it  Place the film against the inside of your cheek  If your doctor told you to use more than 1 film, place the second film inside your other cheek  Do not place more than 2 films inside of 1 cheek at a time  Do not move or touch the film  Do not eat or drink anything until the film is completely dissolved  · Sublingual tablet: Place the tablet under your tongue  If your doctor told you to use more than 1 tablet, place all of the tablets in different places under your tongue at the same time  You can use 2 tablets at a time until you have taken all of the medicine, if that is easier for you  Let the tablets dissolve completely in your mouth  Do not eat or drink anything until the tablets are completely dissolved  · Sublingual film: Drink some water to help moisten your mouth  Place the film under your tongue  If your doctor told you to use more than 1 film, place the second film on the opposite side from the first one  Do not move the film after you place it under your tongue   If you are supposed to use more than 2 films, use them the same way, but do not start until the first 2 films are completely dissolved  · Do not break, crush, chew, or cut the film or tablet  · This medicine should come with a Medication Guide  Ask your pharmacist for a copy if you do not have one  · Missed dose: Take a dose as soon as you remember  If it is almost time for your next dose, wait until then and take a regular dose  Do not take extra medicine to make up for a missed dose  · Store the medicine in a closed container at room temperature, away from heat, moisture, and direct light  Ask your pharmacist about the best way to dispose of medicine you do not use  Drugs and Foods to Avoid:   Ask your doctor or pharmacist before using any other medicine, including over-the-counter medicines, vitamins, and herbal products  · Do not use this medicine if you are using or have used an MAO inhibitor within the past 14 days  · Some medicines can affect how buprenorphine/naloxone works  Tell your doctor if you are using the following:   ¨ Carbamazepine, erythromycin, ketoconazole, mirtazapine, phenobarbital, phenytoin, rifampin, tramadol, or trazodone  ¨ Medicine to treat depression  ¨ Medicine to treat HIV/AIDS (including atazanavir, delavirdine, efavirenz, etravirine, nevirapine, ritonavir)  ¨ Phenothiazine medicine  · Do not drink alcohol while you are using this medicine  · Tell your doctor if you use anything else that makes you sleepy  Some examples are allergy medicine, narcotic pain medicine, and alcohol  Tell your doctor if you are also using butorphanol, nalbuphine, pentazocine, or a muscle relaxer    Warnings While Using This Medicine:   · Tell your doctor if you are pregnant or breastfeeding, or if you have kidney disease, liver disease (including hepatitis), lung or breathing problems, adrenal gland problems, an enlarged prostate, trouble urinating, gallbladder problems, low thyroid levels, stomach problems, or a history of depression, brain tumor, head injury, alcohol or drug abuse  · This medicine may cause the following problems:  ¨ High risk of overdose, which can lead to death  ¨ Respiratory depression (serious breathing problem that can be life-threatening)  ¨ Liver problems  ¨ Serotonin syndrome, when used with certain medicines  · This medicine may make you dizzy or drowsy  Do not drive or do anything that could be dangerous until you know how this medicine affects you  Stand or sit up slowly if you feel lightheaded or dizzy  · Tell any doctor or dentist who treats you that you are using this medicine  · This medicine can be habit-forming  Do not use more than your prescribed dose  Call your doctor if you think your medicine is not working  · Do not stop using this medicine suddenly  Your doctor will need to slowly decrease your dose before you stop it completely  · This medicine could cause infertility  Talk with your doctor before using this medicine if you plan to have children  · Keep all medicine out of the reach of children  Never share your medicine with anyone    Possible Side Effects While Using This Medicine:   Call your doctor right away if you notice any of these side effects:  · Allergic reaction: Itching or hives, swelling in your face or hands, swelling or tingling in your mouth or throat, chest tightness, trouble breathing  · Blue lips, fingernails, or skin  · Dark urine or pale stools, nausea, vomiting, loss of appetite, stomach pain, yellow skin or eyes  · Extreme dizziness or weakness, shallow breathing, sweating, seizures, cold or clammy skin  · Severe confusion, lightheadedness, dizziness, or fainting  · Trouble breathing or slow breathing  If you notice these less serious side effects, talk with your doctor:   · Headache, trouble sleeping  · Constipation or upset stomach  · Shaking, feeling hot or cold, runny nose, watery eyes, diarrhea, vomiting, and muscle aches  If you notice other side effects that you think are caused by this medicine, tell your doctor  Call your doctor for medical advice about side effects  You may report side effects to FDA at 7-323-MXH-4091  © 2017 [address] [employer] Information is for End User's use only and may not be sold, redistributed or otherwise used for commercial purposes  The above information is an  only  It is not intended as medical advice for individual conditions or treatments  Talk to your doctor, nurse or pharmacist before following any medical regimen to see if it is safe and effective for you

## 2020-02-19 DIAGNOSIS — F19.10 IV DRUG ABUSE (HCC): ICD-10-CM

## 2020-02-19 DIAGNOSIS — Z51.81 ENCOUNTER FOR MONITORING SUBOXONE MAINTENANCE THERAPY: ICD-10-CM

## 2020-02-19 DIAGNOSIS — F11.10 HEROIN ABUSE (HCC): ICD-10-CM

## 2020-02-19 DIAGNOSIS — F19.20 DRUG DEPENDENCE (HCC): ICD-10-CM

## 2020-02-19 DIAGNOSIS — Z79.899 DRUG THERAPY CONTINUED: ICD-10-CM

## 2020-02-19 DIAGNOSIS — Z79.899 ENCOUNTER FOR MONITORING SUBOXONE MAINTENANCE THERAPY: ICD-10-CM

## 2020-02-19 RX ORDER — BUPRENORPHINE HYDROCHLORIDE AND NALOXONE HYDROCHLORIDE DIHYDRATE 8; 2 MG/1; MG/1
TABLET SUBLINGUAL
Qty: 23 TABLET | Refills: 0 | OUTPATIENT
Start: 2020-02-19 | End: 2020-03-06 | Stop reason: SDUPTHER

## 2020-02-19 NOTE — TELEPHONE ENCOUNTER
Patient called for his suboxone script  He will be coming in today or tomorrow for a urine  Will call in rx to pharmacy

## 2020-02-19 NOTE — TELEPHONE ENCOUNTER
Scheduled Medication Review:  Pt's scheduled medication use was reviewed by myself/staff via the [employer] website  Pt's use has been found to be appropriate w/o any concerns for misuse by the patient  Pt's current conditions require continued scheduled medication use at this time  Future review for continued appropriate medication use and misuse will continue

## 2020-02-20 DIAGNOSIS — F19.20 DRUG DEPENDENCE (HCC): Primary | ICD-10-CM

## 2020-02-20 PROCEDURE — 80307 DRUG TEST PRSMV CHEM ANLYZR: CPT | Performed by: INTERNAL MEDICINE

## 2020-02-25 LAB
BUPRENORPHINE UR CFM-MCNC: 238 NG/ML
BUPRENORPHINE UR QL CFM: NORMAL NG/ML
BUPRENORPHINE UR QL CFM: POSITIVE
BUPRENORPHINE+NOR UR QL: POSITIVE
NORBUPRENORPHINE UR CFM-MCNC: 582 NG/ML
NORBUPRENORPHINE UR QL CFM: POSITIVE

## 2020-03-06 ENCOUNTER — OFFICE VISIT (OUTPATIENT)
Dept: INTERNAL MEDICINE CLINIC | Facility: CLINIC | Age: 25
End: 2020-03-06
Payer: COMMERCIAL

## 2020-03-06 VITALS
HEIGHT: 67 IN | HEART RATE: 66 BPM | DIASTOLIC BLOOD PRESSURE: 66 MMHG | OXYGEN SATURATION: 98 % | SYSTOLIC BLOOD PRESSURE: 102 MMHG | BODY MASS INDEX: 22.99 KG/M2 | TEMPERATURE: 97.6 F | WEIGHT: 146.5 LBS

## 2020-03-06 DIAGNOSIS — F11.10 HEROIN ABUSE (HCC): ICD-10-CM

## 2020-03-06 DIAGNOSIS — Z51.81 ENCOUNTER FOR MONITORING SUBOXONE MAINTENANCE THERAPY: ICD-10-CM

## 2020-03-06 DIAGNOSIS — F19.20 DRUG DEPENDENCE (HCC): Primary | ICD-10-CM

## 2020-03-06 DIAGNOSIS — F19.10 IV DRUG ABUSE (HCC): ICD-10-CM

## 2020-03-06 DIAGNOSIS — Z79.899 DRUG THERAPY CONTINUED: ICD-10-CM

## 2020-03-06 DIAGNOSIS — Z79.899 ENCOUNTER FOR MONITORING SUBOXONE MAINTENANCE THERAPY: ICD-10-CM

## 2020-03-06 PROCEDURE — 3078F DIAST BP <80 MM HG: CPT | Performed by: INTERNAL MEDICINE

## 2020-03-06 PROCEDURE — 3074F SYST BP LT 130 MM HG: CPT | Performed by: INTERNAL MEDICINE

## 2020-03-06 PROCEDURE — 1036F TOBACCO NON-USER: CPT | Performed by: INTERNAL MEDICINE

## 2020-03-06 PROCEDURE — 99213 OFFICE O/P EST LOW 20 MIN: CPT | Performed by: INTERNAL MEDICINE

## 2020-03-06 PROCEDURE — 80307 DRUG TEST PRSMV CHEM ANLYZR: CPT | Performed by: INTERNAL MEDICINE

## 2020-03-06 PROCEDURE — 3008F BODY MASS INDEX DOCD: CPT | Performed by: INTERNAL MEDICINE

## 2020-03-06 RX ORDER — BUPRENORPHINE HYDROCHLORIDE AND NALOXONE HYDROCHLORIDE DIHYDRATE 8; 2 MG/1; MG/1
TABLET SUBLINGUAL
Qty: 23 TABLET | Refills: 0 | OUTPATIENT
Start: 2020-03-06 | End: 2020-03-20 | Stop reason: SDUPTHER

## 2020-03-06 NOTE — PATIENT INSTRUCTIONS
Buprenorphine/Naloxone (Into the mouth)   Buprenorphine (bue-pre-NOR-feen), Naloxone (nal-OX-one)  Treats narcotic dependence  Brand Name(s): Bunavail, Suboxone, Zubsolv   There may be other brand names for this medicine  When This Medicine Should Not Be Used: This medicine is not right for everyone  Do not use it if you had an allergic reaction to buprenorphine or naloxone  How to Use This Medicine: Thin Sheet, Tablet  · Take your medicine as directed  Your dose may need to be changed several times to find what works best for you  · You must let the medicine dissolve  Never swallow the film or tablet  Your body may not absorb enough of the medicine if you swallow it  · Your health caregiver should show you how to use the medicine  If you do not understand, ask for help  It is important to use the medicine correctly  · Do not talk while the medicine is in your mouth  · Buccal film: Rinse your mouth with water to moisten it  Place the film against the inside of your cheek  If your doctor told you to use more than 1 film, place the second film inside your other cheek  Do not place more than 2 films inside of 1 cheek at a time  Do not move or touch the film  Do not eat or drink anything until the film is completely dissolved  · Sublingual tablet: Place the tablet under your tongue  If your doctor told you to use more than 1 tablet, place all of the tablets in different places under your tongue at the same time  You can use 2 tablets at a time until you have taken all of the medicine, if that is easier for you  Let the tablets dissolve completely in your mouth  Do not eat or drink anything until the tablets are completely dissolved  · Sublingual film: Drink some water to help moisten your mouth  Place the film under your tongue  If your doctor told you to use more than 1 film, place the second film on the opposite side from the first one  Do not move the film after you place it under your tongue   If you are supposed to use more than 2 films, use them the same way, but do not start until the first 2 films are completely dissolved  · Do not break, crush, chew, or cut the film or tablet  · This medicine should come with a Medication Guide  Ask your pharmacist for a copy if you do not have one  · Missed dose: Take a dose as soon as you remember  If it is almost time for your next dose, wait until then and take a regular dose  Do not take extra medicine to make up for a missed dose  · Store the medicine in a closed container at room temperature, away from heat, moisture, and direct light  Ask your pharmacist about the best way to dispose of medicine you do not use  Drugs and Foods to Avoid:   Ask your doctor or pharmacist before using any other medicine, including over-the-counter medicines, vitamins, and herbal products  · Do not use this medicine if you are using or have used an MAO inhibitor within the past 14 days  · Some medicines can affect how buprenorphine/naloxone works  Tell your doctor if you are using the following:   ¨ Carbamazepine, erythromycin, ketoconazole, mirtazapine, phenobarbital, phenytoin, rifampin, tramadol, or trazodone  ¨ Medicine to treat depression  ¨ Medicine to treat HIV/AIDS (including atazanavir, delavirdine, efavirenz, etravirine, nevirapine, ritonavir)  ¨ Phenothiazine medicine  · Do not drink alcohol while you are using this medicine  · Tell your doctor if you use anything else that makes you sleepy  Some examples are allergy medicine, narcotic pain medicine, and alcohol  Tell your doctor if you are also using butorphanol, nalbuphine, pentazocine, or a muscle relaxer    Warnings While Using This Medicine:   · Tell your doctor if you are pregnant or breastfeeding, or if you have kidney disease, liver disease (including hepatitis), lung or breathing problems, adrenal gland problems, an enlarged prostate, trouble urinating, gallbladder problems, low thyroid levels, stomach problems, or a history of depression, brain tumor, head injury, alcohol or drug abuse  · This medicine may cause the following problems:  ¨ High risk of overdose, which can lead to death  ¨ Respiratory depression (serious breathing problem that can be life-threatening)  ¨ Liver problems  ¨ Serotonin syndrome, when used with certain medicines  · This medicine may make you dizzy or drowsy  Do not drive or do anything that could be dangerous until you know how this medicine affects you  Stand or sit up slowly if you feel lightheaded or dizzy  · Tell any doctor or dentist who treats you that you are using this medicine  · This medicine can be habit-forming  Do not use more than your prescribed dose  Call your doctor if you think your medicine is not working  · Do not stop using this medicine suddenly  Your doctor will need to slowly decrease your dose before you stop it completely  · This medicine could cause infertility  Talk with your doctor before using this medicine if you plan to have children  · Keep all medicine out of the reach of children  Never share your medicine with anyone    Possible Side Effects While Using This Medicine:   Call your doctor right away if you notice any of these side effects:  · Allergic reaction: Itching or hives, swelling in your face or hands, swelling or tingling in your mouth or throat, chest tightness, trouble breathing  · Blue lips, fingernails, or skin  · Dark urine or pale stools, nausea, vomiting, loss of appetite, stomach pain, yellow skin or eyes  · Extreme dizziness or weakness, shallow breathing, sweating, seizures, cold or clammy skin  · Severe confusion, lightheadedness, dizziness, or fainting  · Trouble breathing or slow breathing  If you notice these less serious side effects, talk with your doctor:   · Headache, trouble sleeping  · Constipation or upset stomach  · Shaking, feeling hot or cold, runny nose, watery eyes, diarrhea, vomiting, and muscle aches  If you notice other side effects that you think are caused by this medicine, tell your doctor  Call your doctor for medical advice about side effects  You may report side effects to FDA at 1-317-IAS-5266  © 2017 [employer] [employer] Information is for End User's use only and may not be sold, redistributed or otherwise used for commercial purposes  The above information is an  only  It is not intended as medical advice for individual conditions or treatments  Talk to your doctor, nurse or pharmacist before following any medical regimen to see if it is safe and effective for you

## 2020-03-06 NOTE — PROGRESS NOTES
Assessment/Plan:  Problem List Items Addressed This Visit        Other    Drug dependence ([facility] ) - Primary    Relevant Medications    buprenorphine-naloxone (SUBOXONE) 8-2 mg per SL tablet    Encounter for monitoring Suboxone maintenance therapy    Relevant Medications    buprenorphine-naloxone (SUBOXONE) 8-2 mg per SL tablet    Other Relevant Orders    Suboxone    Toxicology screen, urine    Drug therapy continued    Relevant Medications    buprenorphine-naloxone (SUBOXONE) 8-2 mg per SL tablet    Heroin abuse (HCC)    Relevant Medications    buprenorphine-naloxone (SUBOXONE) 8-2 mg per SL tablet    IV drug abuse ([facility] )    Relevant Medications    buprenorphine-naloxone (SUBOXONE) 8-2 mg per SL tablet           Diagnoses and all orders for this visit:    Drug dependence ([facility] )  -     buprenorphine-naloxone (SUBOXONE) 8-2 mg per SL tablet; Three quarters of a tab sl q day  Encounter for monitoring Suboxone maintenance therapy  -     Suboxone  -     Toxicology screen, urine  -     buprenorphine-naloxone (SUBOXONE) 8-2 mg per SL tablet; Three quarters of a tab sl q day  Heroin abuse (HCC)  -     buprenorphine-naloxone (SUBOXONE) 8-2 mg per SL tablet; Three quarters of a tab sl q day  IV drug abuse (HCC)  -     buprenorphine-naloxone (SUBOXONE) 8-2 mg per SL tablet; Three quarters of a tab sl q day  Drug therapy continued  -     buprenorphine-naloxone (SUBOXONE) 8-2 mg per SL tablet; Three quarters of a tab sl q day  No problem-specific Assessment & Plan notes found for this encounter  Scheduled Medication Review:  Pt's scheduled medication use was reviewed by myself/staff via the [employer] website  Pt's use has been found to be appropriate w/o any concerns for misuse by the patient  Pt's current conditions require continued scheduled medication use at this time  Future review for continued appropriate medication use and misuse will continue          A/P: Doing well and will continue 6mg tabs, dose 5/6 and continue with NA, but get into counseling  Reminded to keep meds safe and out of reach of children  RTC 4 weeks  Subjective:  presents for f/u suboxone  Doing well and no c/o's  Not using and no withdraw  Doesn't  attend counseling, but goes to [facility]  UDT obtained today  Tolerating the meds and no side effects  Patient ID: Nika Brown is a 30 y o  male  HPI    The following portions of the patient's history were reviewed and updated as appropriate:   He has a past medical history of Bipolar 2 disorder ([facility] ), MAURICE (generalized anxiety disorder) (10/10/2018), Heroin abuse ([facility] ) (10/10/2018), and Manic behavior ([facility] )  ,  does not have any pertinent problems on file  ,   has a past surgical history that includes Mandible fracture surgery and [place] tooth extraction  ,  family history includes Hepatitis in his father; Multiple sclerosis in his mother; Seizures in his mother  ,   reports that he has quit smoking  He has a 3 00 pack-year smoking history  He has quit using smokeless tobacco   His smokeless tobacco use included chew  He reports that he has current or past drug history  Drugs: Amphetamines, Cocaine, Hydrocodone, Heroin, Marijuana, and Oxycodone  He reports that he does not drink alcohol ,  is allergic to topamax [topiramate]     Current Outpatient Medications   Medication Sig Dispense Refill    buprenorphine-naloxone (SUBOXONE) 8-2 mg per SL tablet Three quarters of a tab sl q day  23 tablet 0    cloNIDine (CATAPRES) 0 1 mg tablet Take 1 tablet (0 1 mg total) by mouth 3 (three) times a day 90 tablet 0    NARCAN 4 MG/0 1ML LIQD instill 1 spray in 1 NOSTRIL if needed for opioid overdose may re     (REFER TO PRESCRIPTION NOTES)  0    zolpidem (AMBIEN) 10 mg tablet Take 1 tablet (10 mg total) by mouth daily at bedtime as needed for sleep 30 tablet 0     No current facility-administered medications for this visit          Review of Systems   Constitutional: Negative for activity change, chills, diaphoresis, fatigue and fever  Respiratory: Negative for cough, chest tightness, shortness of breath and wheezing  Cardiovascular: Negative for chest pain, palpitations and leg swelling  Gastrointestinal: Negative for abdominal pain, constipation, diarrhea, nausea and vomiting  Genitourinary: Negative for difficulty urinating, dysuria and frequency  Musculoskeletal: Negative for arthralgias, gait problem and myalgias  Neurological: Negative for dizziness, seizures, syncope, weakness, light-headedness and headaches  Psychiatric/Behavioral: Negative for confusion, dysphoric mood and sleep disturbance  The patient is not nervous/anxious  PHQ-9 Depression Screening    PHQ-9:    Frequency of the following problems over the past two weeks:             Objective:  Vitals:    03/06/20 1437   BP: 102/66   Pulse: 66   Temp: 97 6 °F (36 4 °C)   SpO2: 98%   Weight: 66 5 kg (146 lb 8 oz)   Height: 5' 7" (1 702 m)     Body mass index is 22 95 kg/m²  Physical Exam   Constitutional: He is oriented to person, place, and time  He appears well-developed and well-nourished  No distress  HENT:   Head: Normocephalic and atraumatic  Mouth/Throat: Oropharynx is clear and moist    Eyes: Pupils are equal, round, and reactive to light  Conjunctivae and EOM are normal    Cardiovascular: Normal rate, regular rhythm and normal heart sounds  Pulmonary/Chest: Effort normal and breath sounds normal  No respiratory distress  He has no wheezes  He has no rales  Abdominal: Soft  Bowel sounds are normal  He exhibits no distension  There is no tenderness  Neurological: He is alert and oriented to person, place, and time  Psychiatric: He has a normal mood and affect  His behavior is normal  Judgment and thought content normal    Nursing note and vitals reviewed

## 2020-03-10 DIAGNOSIS — I10 ESSENTIAL HYPERTENSION: ICD-10-CM

## 2020-03-10 DIAGNOSIS — G47.00 INSOMNIA, UNSPECIFIED TYPE: ICD-10-CM

## 2020-03-10 RX ORDER — ZOLPIDEM TARTRATE 10 MG/1
10 TABLET ORAL
Qty: 30 TABLET | Refills: 0 | Status: SHIPPED | OUTPATIENT
Start: 2020-03-10 | End: 2020-04-02 | Stop reason: SDUPTHER

## 2020-03-10 RX ORDER — CLONIDINE HYDROCHLORIDE 0.1 MG/1
0.1 TABLET ORAL 3 TIMES DAILY
Qty: 90 TABLET | Refills: 0 | Status: SHIPPED | OUTPATIENT
Start: 2020-03-10 | End: 2020-04-02 | Stop reason: SDUPTHER

## 2020-03-10 NOTE — TELEPHONE ENCOUNTER
Pt needs cloNIDine (CATAPRES) 0 1 mg tablet and zolpidem (AMBIEN) 10 mg tablet refilled, send to [facility] on [address]

## 2020-03-10 NOTE — TELEPHONE ENCOUNTER
Scheduled Medication Review:  Pt's scheduled medication use was reviewed by myself/staff via the [employer] website  Pt's use has been found to be appropriate w/o any concerns for misuse by the patient  Pt's current conditions require continued scheduled medication use at this time  Future review for continued appropriate medication use and misuse will continue

## 2020-03-11 LAB
BUPRENORPHINE UR CFM-MCNC: 54 NG/ML
BUPRENORPHINE UR QL CFM: NORMAL NG/ML
BUPRENORPHINE UR QL CFM: POSITIVE
BUPRENORPHINE+NOR UR QL: POSITIVE
NORBUPRENORPHINE UR CFM-MCNC: 164 NG/ML
NORBUPRENORPHINE UR QL CFM: POSITIVE

## 2020-03-20 DIAGNOSIS — F11.10 HEROIN ABUSE (HCC): ICD-10-CM

## 2020-03-20 DIAGNOSIS — F19.20 DRUG DEPENDENCE (HCC): ICD-10-CM

## 2020-03-20 DIAGNOSIS — Z79.899 ENCOUNTER FOR MONITORING SUBOXONE MAINTENANCE THERAPY: ICD-10-CM

## 2020-03-20 DIAGNOSIS — Z79.899 DRUG THERAPY CONTINUED: ICD-10-CM

## 2020-03-20 DIAGNOSIS — F19.10 IV DRUG ABUSE (HCC): ICD-10-CM

## 2020-03-20 DIAGNOSIS — Z51.81 ENCOUNTER FOR MONITORING SUBOXONE MAINTENANCE THERAPY: ICD-10-CM

## 2020-03-21 RX ORDER — BUPRENORPHINE HYDROCHLORIDE AND NALOXONE HYDROCHLORIDE DIHYDRATE 8; 2 MG/1; MG/1
TABLET SUBLINGUAL
Qty: 14 TABLET | Refills: 0 | OUTPATIENT
Start: 2020-03-21 | End: 2020-04-02

## 2020-03-21 NOTE — TELEPHONE ENCOUNTER
Scheduled Medication Review:  Pt's scheduled medication use was reviewed by myself/staff via the [employer] website  Pt's use has been found to be appropriate w/o any concerns for misuse by the patient  Pt's current conditions require continued scheduled medication use at this time  Future review for continued appropriate medication use and misuse will continue

## 2020-04-02 ENCOUNTER — TELEMEDICINE (OUTPATIENT)
Dept: INTERNAL MEDICINE CLINIC | Facility: CLINIC | Age: 25
End: 2020-04-02
Payer: COMMERCIAL

## 2020-04-02 VITALS — HEART RATE: 74 BPM

## 2020-04-02 DIAGNOSIS — F19.10 IV DRUG ABUSE (HCC): ICD-10-CM

## 2020-04-02 DIAGNOSIS — I10 ESSENTIAL HYPERTENSION: ICD-10-CM

## 2020-04-02 DIAGNOSIS — F19.20 DRUG DEPENDENCE (HCC): Primary | ICD-10-CM

## 2020-04-02 DIAGNOSIS — Z79.899 DRUG THERAPY CONTINUED: ICD-10-CM

## 2020-04-02 DIAGNOSIS — F11.10 HEROIN ABUSE (HCC): ICD-10-CM

## 2020-04-02 DIAGNOSIS — G47.00 INSOMNIA, UNSPECIFIED TYPE: ICD-10-CM

## 2020-04-02 DIAGNOSIS — Z79.899 ENCOUNTER FOR MONITORING SUBOXONE MAINTENANCE THERAPY: ICD-10-CM

## 2020-04-02 DIAGNOSIS — Z51.81 ENCOUNTER FOR MONITORING SUBOXONE MAINTENANCE THERAPY: ICD-10-CM

## 2020-04-02 PROCEDURE — 99213 OFFICE O/P EST LOW 20 MIN: CPT | Performed by: INTERNAL MEDICINE

## 2020-04-02 RX ORDER — BUPRENORPHINE HYDROCHLORIDE AND NALOXONE HYDROCHLORIDE DIHYDRATE 8; 2 MG/1; MG/1
TABLET SUBLINGUAL
Qty: 23 TABLET | Refills: 0 | OUTPATIENT
Start: 2020-04-02 | End: 2020-04-30

## 2020-04-02 RX ORDER — ZOLPIDEM TARTRATE 10 MG/1
10 TABLET ORAL
Qty: 30 TABLET | Refills: 0 | Status: SHIPPED | OUTPATIENT
Start: 2020-04-02 | End: 2020-05-18 | Stop reason: SDUPTHER

## 2020-04-02 RX ORDER — CLONIDINE HYDROCHLORIDE 0.1 MG/1
0.1 TABLET ORAL 3 TIMES DAILY
Qty: 90 TABLET | Refills: 0 | Status: SHIPPED | OUTPATIENT
Start: 2020-04-02 | End: 2020-05-18 | Stop reason: SDUPTHER

## 2020-04-30 ENCOUNTER — OFFICE VISIT (OUTPATIENT)
Dept: INTERNAL MEDICINE CLINIC | Facility: CLINIC | Age: 25
End: 2020-04-30
Payer: COMMERCIAL

## 2020-04-30 VITALS
OXYGEN SATURATION: 98 % | BODY MASS INDEX: 23.02 KG/M2 | HEART RATE: 104 BPM | TEMPERATURE: 98.4 F | SYSTOLIC BLOOD PRESSURE: 128 MMHG | DIASTOLIC BLOOD PRESSURE: 84 MMHG | WEIGHT: 147 LBS

## 2020-04-30 DIAGNOSIS — F11.10 HEROIN ABUSE (HCC): ICD-10-CM

## 2020-04-30 DIAGNOSIS — F19.20 DRUG DEPENDENCE (HCC): Primary | ICD-10-CM

## 2020-04-30 DIAGNOSIS — Z79.899 ENCOUNTER FOR MONITORING SUBOXONE MAINTENANCE THERAPY: ICD-10-CM

## 2020-04-30 DIAGNOSIS — Z51.81 ENCOUNTER FOR MONITORING SUBOXONE MAINTENANCE THERAPY: ICD-10-CM

## 2020-04-30 DIAGNOSIS — Z79.899 DRUG THERAPY CONTINUED: ICD-10-CM

## 2020-04-30 DIAGNOSIS — F19.10 IV DRUG ABUSE (HCC): ICD-10-CM

## 2020-04-30 PROCEDURE — 80307 DRUG TEST PRSMV CHEM ANLYZR: CPT | Performed by: INTERNAL MEDICINE

## 2020-04-30 PROCEDURE — 3079F DIAST BP 80-89 MM HG: CPT | Performed by: INTERNAL MEDICINE

## 2020-04-30 PROCEDURE — 3074F SYST BP LT 130 MM HG: CPT | Performed by: INTERNAL MEDICINE

## 2020-04-30 PROCEDURE — 99213 OFFICE O/P EST LOW 20 MIN: CPT | Performed by: INTERNAL MEDICINE

## 2020-04-30 PROCEDURE — 1036F TOBACCO NON-USER: CPT | Performed by: INTERNAL MEDICINE

## 2020-04-30 RX ORDER — BUPRENORPHINE HYDROCHLORIDE AND NALOXONE HYDROCHLORIDE DIHYDRATE 8; 2 MG/1; MG/1
TABLET SUBLINGUAL
Qty: 15 TABLET | Refills: 0 | Status: SHIPPED | OUTPATIENT
Start: 2020-04-30 | End: 2020-05-27 | Stop reason: SDUPTHER

## 2020-05-04 LAB
BUPRENORPHINE UR CFM-MCNC: 62 NG/ML
BUPRENORPHINE UR QL CFM: NORMAL NG/ML
BUPRENORPHINE UR QL CFM: POSITIVE
BUPRENORPHINE+NOR UR QL: POSITIVE
NORBUPRENORPHINE UR CFM-MCNC: 208 NG/ML
NORBUPRENORPHINE UR QL CFM: POSITIVE

## 2020-05-15 DIAGNOSIS — G47.00 INSOMNIA, UNSPECIFIED TYPE: ICD-10-CM

## 2020-05-15 DIAGNOSIS — I10 ESSENTIAL HYPERTENSION: ICD-10-CM

## 2020-05-18 RX ORDER — CLONIDINE HYDROCHLORIDE 0.1 MG/1
0.1 TABLET ORAL 3 TIMES DAILY
Qty: 90 TABLET | Refills: 0 | Status: SHIPPED | OUTPATIENT
Start: 2020-05-18 | End: 2020-06-29 | Stop reason: SDUPTHER

## 2020-05-18 RX ORDER — ZOLPIDEM TARTRATE 10 MG/1
10 TABLET ORAL
Qty: 30 TABLET | Refills: 0 | Status: SHIPPED | OUTPATIENT
Start: 2020-05-18 | End: 2020-07-02 | Stop reason: SDUPTHER

## 2020-05-27 ENCOUNTER — OFFICE VISIT (OUTPATIENT)
Dept: INTERNAL MEDICINE CLINIC | Facility: CLINIC | Age: 25
End: 2020-05-27
Payer: COMMERCIAL

## 2020-05-27 VITALS
WEIGHT: 145.2 LBS | TEMPERATURE: 97.8 F | BODY MASS INDEX: 22.79 KG/M2 | DIASTOLIC BLOOD PRESSURE: 60 MMHG | SYSTOLIC BLOOD PRESSURE: 120 MMHG | HEART RATE: 74 BPM | OXYGEN SATURATION: 98 % | HEIGHT: 67 IN

## 2020-05-27 DIAGNOSIS — Z79.899 DRUG THERAPY CONTINUED: ICD-10-CM

## 2020-05-27 DIAGNOSIS — Z51.81 ENCOUNTER FOR MONITORING SUBOXONE MAINTENANCE THERAPY: ICD-10-CM

## 2020-05-27 DIAGNOSIS — Z79.899 ENCOUNTER FOR MONITORING SUBOXONE MAINTENANCE THERAPY: ICD-10-CM

## 2020-05-27 DIAGNOSIS — F11.10 HEROIN ABUSE (HCC): ICD-10-CM

## 2020-05-27 DIAGNOSIS — F19.20 DRUG DEPENDENCE (HCC): Primary | ICD-10-CM

## 2020-05-27 DIAGNOSIS — F19.10 IV DRUG ABUSE (HCC): ICD-10-CM

## 2020-05-27 PROCEDURE — 1036F TOBACCO NON-USER: CPT | Performed by: INTERNAL MEDICINE

## 2020-05-27 PROCEDURE — 99213 OFFICE O/P EST LOW 20 MIN: CPT | Performed by: INTERNAL MEDICINE

## 2020-05-27 PROCEDURE — 3078F DIAST BP <80 MM HG: CPT | Performed by: INTERNAL MEDICINE

## 2020-05-27 PROCEDURE — 3074F SYST BP LT 130 MM HG: CPT | Performed by: INTERNAL MEDICINE

## 2020-05-27 PROCEDURE — 3008F BODY MASS INDEX DOCD: CPT | Performed by: INTERNAL MEDICINE

## 2020-05-27 RX ORDER — BUPRENORPHINE HYDROCHLORIDE AND NALOXONE HYDROCHLORIDE DIHYDRATE 8; 2 MG/1; MG/1
TABLET SUBLINGUAL
Qty: 15 TABLET | Refills: 0 | Status: SHIPPED | OUTPATIENT
Start: 2020-05-27 | End: 2020-06-24 | Stop reason: SDUPTHER

## 2020-06-24 ENCOUNTER — OFFICE VISIT (OUTPATIENT)
Dept: INTERNAL MEDICINE CLINIC | Facility: CLINIC | Age: 25
End: 2020-06-24
Payer: COMMERCIAL

## 2020-06-24 VITALS
DIASTOLIC BLOOD PRESSURE: 80 MMHG | OXYGEN SATURATION: 98 % | BODY MASS INDEX: 22.76 KG/M2 | SYSTOLIC BLOOD PRESSURE: 116 MMHG | HEART RATE: 77 BPM | WEIGHT: 145 LBS | TEMPERATURE: 97.6 F | HEIGHT: 67 IN

## 2020-06-24 DIAGNOSIS — F19.20 DRUG DEPENDENCE (HCC): ICD-10-CM

## 2020-06-24 DIAGNOSIS — F11.10 HEROIN ABUSE (HCC): ICD-10-CM

## 2020-06-24 DIAGNOSIS — Z79.899 DRUG THERAPY CONTINUED: ICD-10-CM

## 2020-06-24 DIAGNOSIS — Z51.81 ENCOUNTER FOR MONITORING SUBOXONE MAINTENANCE THERAPY: Primary | ICD-10-CM

## 2020-06-24 DIAGNOSIS — Z79.899 ENCOUNTER FOR MONITORING SUBOXONE MAINTENANCE THERAPY: Primary | ICD-10-CM

## 2020-06-24 DIAGNOSIS — F19.10 IV DRUG ABUSE (HCC): ICD-10-CM

## 2020-06-24 PROCEDURE — 3079F DIAST BP 80-89 MM HG: CPT | Performed by: INTERNAL MEDICINE

## 2020-06-24 PROCEDURE — 1036F TOBACCO NON-USER: CPT | Performed by: INTERNAL MEDICINE

## 2020-06-24 PROCEDURE — 99213 OFFICE O/P EST LOW 20 MIN: CPT | Performed by: INTERNAL MEDICINE

## 2020-06-24 PROCEDURE — 3074F SYST BP LT 130 MM HG: CPT | Performed by: INTERNAL MEDICINE

## 2020-06-24 PROCEDURE — 3008F BODY MASS INDEX DOCD: CPT | Performed by: INTERNAL MEDICINE

## 2020-06-24 PROCEDURE — 80307 DRUG TEST PRSMV CHEM ANLYZR: CPT | Performed by: INTERNAL MEDICINE

## 2020-06-24 RX ORDER — BUPRENORPHINE HYDROCHLORIDE AND NALOXONE HYDROCHLORIDE DIHYDRATE 8; 2 MG/1; MG/1
TABLET SUBLINGUAL
Qty: 15 TABLET | Refills: 0 | Status: SHIPPED | OUTPATIENT
Start: 2020-06-24 | End: 2020-07-22 | Stop reason: SDUPTHER

## 2020-06-25 DIAGNOSIS — I10 ESSENTIAL HYPERTENSION: ICD-10-CM

## 2020-06-29 LAB
BUPRENORPHINE UR CFM-MCNC: 138 NG/ML
BUPRENORPHINE UR QL CFM: NORMAL NG/ML
BUPRENORPHINE UR QL CFM: POSITIVE
BUPRENORPHINE+NOR UR QL: POSITIVE
NORBUPRENORPHINE UR CFM-MCNC: 136 NG/ML
NORBUPRENORPHINE UR QL CFM: POSITIVE

## 2020-06-29 RX ORDER — CLONIDINE HYDROCHLORIDE 0.1 MG/1
0.1 TABLET ORAL 3 TIMES DAILY
Qty: 90 TABLET | Refills: 5 | Status: SHIPPED | OUTPATIENT
Start: 2020-06-29 | End: 2020-08-07 | Stop reason: SDUPTHER

## 2020-07-02 DIAGNOSIS — G47.00 INSOMNIA, UNSPECIFIED TYPE: ICD-10-CM

## 2020-07-02 RX ORDER — ZOLPIDEM TARTRATE 10 MG/1
10 TABLET ORAL
Qty: 30 TABLET | Refills: 0 | Status: SHIPPED | OUTPATIENT
Start: 2020-07-02 | End: 2020-08-07 | Stop reason: SDUPTHER

## 2020-07-22 ENCOUNTER — OFFICE VISIT (OUTPATIENT)
Dept: INTERNAL MEDICINE CLINIC | Facility: CLINIC | Age: 25
End: 2020-07-22
Payer: COMMERCIAL

## 2020-07-22 VITALS
SYSTOLIC BLOOD PRESSURE: 118 MMHG | BODY MASS INDEX: 23.46 KG/M2 | HEIGHT: 67 IN | TEMPERATURE: 98 F | OXYGEN SATURATION: 97 % | HEART RATE: 98 BPM | WEIGHT: 149.5 LBS | DIASTOLIC BLOOD PRESSURE: 68 MMHG

## 2020-07-22 DIAGNOSIS — Z51.81 ENCOUNTER FOR MONITORING SUBOXONE MAINTENANCE THERAPY: ICD-10-CM

## 2020-07-22 DIAGNOSIS — Z79.899 DRUG THERAPY CONTINUED: ICD-10-CM

## 2020-07-22 DIAGNOSIS — F19.10 IV DRUG ABUSE (HCC): ICD-10-CM

## 2020-07-22 DIAGNOSIS — Z79.899 ENCOUNTER FOR MONITORING SUBOXONE MAINTENANCE THERAPY: ICD-10-CM

## 2020-07-22 DIAGNOSIS — F19.20 DRUG DEPENDENCE (HCC): Primary | ICD-10-CM

## 2020-07-22 DIAGNOSIS — F11.10 HEROIN ABUSE (HCC): ICD-10-CM

## 2020-07-22 PROCEDURE — 3078F DIAST BP <80 MM HG: CPT | Performed by: INTERNAL MEDICINE

## 2020-07-22 PROCEDURE — 3008F BODY MASS INDEX DOCD: CPT | Performed by: INTERNAL MEDICINE

## 2020-07-22 PROCEDURE — 1036F TOBACCO NON-USER: CPT | Performed by: INTERNAL MEDICINE

## 2020-07-22 PROCEDURE — 3074F SYST BP LT 130 MM HG: CPT | Performed by: INTERNAL MEDICINE

## 2020-07-22 PROCEDURE — 99213 OFFICE O/P EST LOW 20 MIN: CPT | Performed by: INTERNAL MEDICINE

## 2020-07-22 RX ORDER — BUPRENORPHINE HYDROCHLORIDE AND NALOXONE HYDROCHLORIDE DIHYDRATE 8; 2 MG/1; MG/1
TABLET SUBLINGUAL
Qty: 15 TABLET | Refills: 0 | Status: SHIPPED | OUTPATIENT
Start: 2020-07-22 | End: 2020-08-20 | Stop reason: SDUPTHER

## 2020-07-22 NOTE — PATIENT INSTRUCTIONS
Buprenorphine/Naloxone (Into the mouth)   Buprenorphine (bue-pre-NOR-feen), Naloxone (nal-OX-one)  Treats narcotic dependence  Brand Name(s): Bunavail, Suboxone, Zubsolv   There may be other brand names for this medicine  When This Medicine Should Not Be Used: This medicine is not right for everyone  Do not use it if you had an allergic reaction to buprenorphine or naloxone  How to Use This Medicine: Thin Sheet, Tablet  · Take your medicine as directed  Your dose may need to be changed several times to find what works best for you  · You must let the medicine dissolve  Never swallow the film or tablet  Your body may not absorb enough of the medicine if you swallow it  · Your health caregiver should show you how to use the medicine  If you do not understand, ask for help  It is important to use the medicine correctly  · Do not talk while the medicine is in your mouth  · Buccal film: Rinse your mouth with water to moisten it  Place the film against the inside of your cheek  If your doctor told you to use more than 1 film, place the second film inside your other cheek  Do not place more than 2 films inside of 1 cheek at a time  Do not move or touch the film  Do not eat or drink anything until the film is completely dissolved  · Sublingual tablet: Place the tablet under your tongue  If your doctor told you to use more than 1 tablet, place all of the tablets in different places under your tongue at the same time  You can use 2 tablets at a time until you have taken all of the medicine, if that is easier for you  Let the tablets dissolve completely in your mouth  Do not eat or drink anything until the tablets are completely dissolved  · Sublingual film: Drink some water to help moisten your mouth  Place the film under your tongue  If your doctor told you to use more than 1 film, place the second film on the opposite side from the first one  Do not move the film after you place it under your tongue   If you are supposed to use more than 2 films, use them the same way, but do not start until the first 2 films are completely dissolved  · Do not break, crush, chew, or cut the film or tablet  · This medicine should come with a Medication Guide  Ask your pharmacist for a copy if you do not have one  · Missed dose: Take a dose as soon as you remember  If it is almost time for your next dose, wait until then and take a regular dose  Do not take extra medicine to make up for a missed dose  · Store the medicine in a closed container at room temperature, away from heat, moisture, and direct light  Ask your pharmacist about the best way to dispose of medicine you do not use  Drugs and Foods to Avoid:   Ask your doctor or pharmacist before using any other medicine, including over-the-counter medicines, vitamins, and herbal products  · Do not use this medicine if you are using or have used an MAO inhibitor within the past 14 days  · Some medicines can affect how buprenorphine/naloxone works  Tell your doctor if you are using the following:   ¨ Carbamazepine, erythromycin, ketoconazole, mirtazapine, phenobarbital, phenytoin, rifampin, tramadol, or trazodone  ¨ Medicine to treat depression  ¨ Medicine to treat HIV/AIDS (including atazanavir, delavirdine, efavirenz, etravirine, nevirapine, ritonavir)  ¨ Phenothiazine medicine  · Do not drink alcohol while you are using this medicine  · Tell your doctor if you use anything else that makes you sleepy  Some examples are allergy medicine, narcotic pain medicine, and alcohol  Tell your doctor if you are also using butorphanol, nalbuphine, pentazocine, or a muscle relaxer    Warnings While Using This Medicine:   · Tell your doctor if you are pregnant or breastfeeding, or if you have kidney disease, liver disease (including hepatitis), lung or breathing problems, adrenal gland problems, an enlarged prostate, trouble urinating, gallbladder problems, low thyroid levels, stomach problems, or a history of depression, brain tumor, head injury, alcohol or drug abuse  · This medicine may cause the following problems:  ¨ High risk of overdose, which can lead to death  ¨ Respiratory depression (serious breathing problem that can be life-threatening)  ¨ Liver problems  ¨ Serotonin syndrome, when used with certain medicines  · This medicine may make you dizzy or drowsy  Do not drive or do anything that could be dangerous until you know how this medicine affects you  Stand or sit up slowly if you feel lightheaded or dizzy  · Tell any doctor or dentist who treats you that you are using this medicine  · This medicine can be habit-forming  Do not use more than your prescribed dose  Call your doctor if you think your medicine is not working  · Do not stop using this medicine suddenly  Your doctor will need to slowly decrease your dose before you stop it completely  · This medicine could cause infertility  Talk with your doctor before using this medicine if you plan to have children  · Keep all medicine out of the reach of children  Never share your medicine with anyone    Possible Side Effects While Using This Medicine:   Call your doctor right away if you notice any of these side effects:  · Allergic reaction: Itching or hives, swelling in your face or hands, swelling or tingling in your mouth or throat, chest tightness, trouble breathing  · Blue lips, fingernails, or skin  · Dark urine or pale stools, nausea, vomiting, loss of appetite, stomach pain, yellow skin or eyes  · Extreme dizziness or weakness, shallow breathing, sweating, seizures, cold or clammy skin  · Severe confusion, lightheadedness, dizziness, or fainting  · Trouble breathing or slow breathing  If you notice these less serious side effects, talk with your doctor:   · Headache, trouble sleeping  · Constipation or upset stomach  · Shaking, feeling hot or cold, runny nose, watery eyes, diarrhea, vomiting, and muscle aches  If you notice other side effects that you think are caused by this medicine, tell your doctor  Call your doctor for medical advice about side effects  You may report side effects to FDA at 4-073-VGQ-0753  © 2017 [employer] Information is for End User's use only and may not be sold, redistributed or otherwise used for commercial purposes  The above information is an  only  It is not intended as medical advice for individual conditions or treatments  Talk to your doctor, nurse or pharmacist before following any medical regimen to see if it is safe and effective for you

## 2020-07-22 NOTE — PROGRESS NOTES
Assessment/Plan:  Problem List Items Addressed This Visit        Other    Drug dependence ([place] [address] ) - Primary    Relevant Medications    buprenorphine-naloxone (SUBOXONE) 8-2 mg per SL tablet    Encounter for monitoring Suboxone maintenance therapy    Relevant Medications    buprenorphine-naloxone (SUBOXONE) 8-2 mg per SL tablet    Drug therapy continued    Relevant Medications    buprenorphine-naloxone (SUBOXONE) 8-2 mg per SL tablet    Heroin abuse (HCC)    Relevant Medications    buprenorphine-naloxone (SUBOXONE) 8-2 mg per SL tablet    IV drug abuse ([place] [address] )    Relevant Medications    buprenorphine-naloxone (SUBOXONE) 8-2 mg per SL tablet           Diagnoses and all orders for this visit:    Drug dependence ([place] [address] )  -     buprenorphine-naloxone (SUBOXONE) 8-2 mg per SL tablet; Half a tab sl Q day  Drug therapy continued  -     buprenorphine-naloxone (SUBOXONE) 8-2 mg per SL tablet; Half a tab sl Q day  Encounter for monitoring Suboxone maintenance therapy  -     buprenorphine-naloxone (SUBOXONE) 8-2 mg per SL tablet; Half a tab sl Q day  Heroin abuse (HCC)  -     buprenorphine-naloxone (SUBOXONE) 8-2 mg per SL tablet; Half a tab sl Q day  IV drug abuse (HCC)  -     buprenorphine-naloxone (SUBOXONE) 8-2 mg per SL tablet; Half a tab sl Q day  No problem-specific Assessment & Plan notes found for this encounter  Scheduled Medication Review:  Pt's scheduled medication use was reviewed by myself/staff via the [employer] website  Pt's use has been found to be appropriate w/o any concerns for misuse by the patient  Pt's current conditions require continued scheduled medication use at this time  Future review for continued appropriate medication use and misuse will continue  A/P: Doing well and will continue 4mg tabs, dose 4/6 and continue with counseling  Reminded to keep meds safe and out of reach of children  RTC 4 weeks  Subjective: WM presents for f/u suboxone  Doing well and no c/o's   Not using and no withdraw  Does attend counseling  UDT obtained today  Tolerating the meds and no side effects  Patient ID: Beatriz Godinez is a 30 y o  male  HPI    The following portions of the patient's history were reviewed and updated as appropriate:   He has a past medical history of Bipolar 2 disorder ([facility] ), MAURICE (generalized anxiety disorder) (10/10/2018), Heroin abuse ([facility] 75 ) (10/10/2018), and Manic behavior ([facility] 75 )  ,  does not have any pertinent problems on file  ,   has a past surgical history that includes Mandible fracture surgery and [place] tooth extraction  ,  family history includes Hepatitis in his father; Multiple sclerosis in his mother; Seizures in his mother  ,   reports that he has quit smoking  He has a 3 00 pack-year smoking history  He has quit using smokeless tobacco   His smokeless tobacco use included chew  He reports that he has current or past drug history  Drugs: Amphetamines, Cocaine, Hydrocodone, Heroin, Marijuana, and Oxycodone  He reports that he does not drink alcohol ,  is allergic to topamax [topiramate]     Current Outpatient Medications   Medication Sig Dispense Refill    buprenorphine-naloxone (SUBOXONE) 8-2 mg per SL tablet Half a tab sl Q day  15 tablet 0    cloNIDine (CATAPRES) 0 1 mg tablet Take 1 tablet (0 1 mg total) by mouth 3 (three) times a day 90 tablet 5    NARCAN 4 MG/0 1ML LIQD instill 1 spray in 1 NOSTRIL if needed for opioid overdose may re     (REFER TO PRESCRIPTION NOTES)  0    zolpidem (AMBIEN) 10 mg tablet Take 1 tablet (10 mg total) by mouth daily at bedtime as needed for sleep 30 tablet 0     No current facility-administered medications for this visit  Review of Systems   Constitutional: Negative for activity change, chills, diaphoresis, fatigue and fever  Respiratory: Negative for cough, chest tightness, shortness of breath and wheezing  Cardiovascular: Negative for chest pain, palpitations and leg swelling     Gastrointestinal: Negative for abdominal pain, constipation, diarrhea, nausea and vomiting  Genitourinary: Negative for difficulty urinating, dysuria and frequency  Musculoskeletal: Negative for arthralgias, gait problem and myalgias  Neurological: Negative for dizziness, seizures, syncope, weakness, light-headedness and headaches  Psychiatric/Behavioral: Negative for confusion, dysphoric mood, self-injury, sleep disturbance and suicidal ideas  The patient is not nervous/anxious  PHQ-9 Depression Screening    PHQ-9:    Frequency of the following problems over the past two weeks:             Objective:  Vitals:    07/22/20 1521   BP: 118/68   Pulse: 98   Temp: 98 °F (36 7 °C)   SpO2: 97%   Weight: 67 8 kg (149 lb 8 oz)   Height: 5' 7" (1 702 m)     Body mass index is 23 42 kg/m²  Physical Exam   Constitutional: He is oriented to person, place, and time  He appears well-developed and well-nourished  No distress  HENT:   Head: Normocephalic and atraumatic  Mouth/Throat: Oropharynx is clear and moist    Eyes: Pupils are equal, round, and reactive to light  Conjunctivae and EOM are normal    Cardiovascular: Normal rate, regular rhythm and normal heart sounds  Pulmonary/Chest: Effort normal and breath sounds normal  No respiratory distress  He has no wheezes  He has no rales  Abdominal: Soft  Bowel sounds are normal  He exhibits no distension  There is no tenderness  Neurological: He is alert and oriented to person, place, and time  Psychiatric: He has a normal mood and affect  His behavior is normal  Judgment and thought content normal    Nursing note and vitals reviewed

## 2020-08-07 DIAGNOSIS — I10 ESSENTIAL HYPERTENSION: ICD-10-CM

## 2020-08-07 DIAGNOSIS — G47.00 INSOMNIA, UNSPECIFIED TYPE: ICD-10-CM

## 2020-08-10 RX ORDER — ZOLPIDEM TARTRATE 10 MG/1
10 TABLET ORAL
Qty: 30 TABLET | Refills: 0 | Status: SHIPPED | OUTPATIENT
Start: 2020-08-10 | End: 2020-09-15 | Stop reason: SDUPTHER

## 2020-08-10 RX ORDER — CLONIDINE HYDROCHLORIDE 0.1 MG/1
0.1 TABLET ORAL 3 TIMES DAILY
Qty: 90 TABLET | Refills: 5 | Status: SHIPPED | OUTPATIENT
Start: 2020-08-10 | End: 2020-10-17 | Stop reason: SDUPTHER

## 2020-08-20 ENCOUNTER — OFFICE VISIT (OUTPATIENT)
Dept: INTERNAL MEDICINE CLINIC | Facility: CLINIC | Age: 25
End: 2020-08-20
Payer: COMMERCIAL

## 2020-08-20 VITALS
SYSTOLIC BLOOD PRESSURE: 122 MMHG | OXYGEN SATURATION: 98 % | DIASTOLIC BLOOD PRESSURE: 64 MMHG | BODY MASS INDEX: 22.76 KG/M2 | HEIGHT: 67 IN | WEIGHT: 145 LBS | TEMPERATURE: 96.5 F | HEART RATE: 78 BPM

## 2020-08-20 DIAGNOSIS — Z79.899 ENCOUNTER FOR MONITORING SUBOXONE MAINTENANCE THERAPY: ICD-10-CM

## 2020-08-20 DIAGNOSIS — F19.20 DRUG DEPENDENCE (HCC): Primary | ICD-10-CM

## 2020-08-20 DIAGNOSIS — F19.10 IV DRUG ABUSE (HCC): ICD-10-CM

## 2020-08-20 DIAGNOSIS — Z51.81 ENCOUNTER FOR MONITORING SUBOXONE MAINTENANCE THERAPY: ICD-10-CM

## 2020-08-20 DIAGNOSIS — F11.10 HEROIN ABUSE (HCC): ICD-10-CM

## 2020-08-20 DIAGNOSIS — Z79.899 DRUG THERAPY CONTINUED: ICD-10-CM

## 2020-08-20 PROCEDURE — 3074F SYST BP LT 130 MM HG: CPT | Performed by: INTERNAL MEDICINE

## 2020-08-20 PROCEDURE — 3078F DIAST BP <80 MM HG: CPT | Performed by: INTERNAL MEDICINE

## 2020-08-20 PROCEDURE — 3008F BODY MASS INDEX DOCD: CPT | Performed by: INTERNAL MEDICINE

## 2020-08-20 PROCEDURE — 99213 OFFICE O/P EST LOW 20 MIN: CPT | Performed by: INTERNAL MEDICINE

## 2020-08-20 PROCEDURE — 80307 DRUG TEST PRSMV CHEM ANLYZR: CPT | Performed by: INTERNAL MEDICINE

## 2020-08-20 PROCEDURE — 1036F TOBACCO NON-USER: CPT | Performed by: INTERNAL MEDICINE

## 2020-08-20 RX ORDER — BUPRENORPHINE HYDROCHLORIDE AND NALOXONE HYDROCHLORIDE DIHYDRATE 8; 2 MG/1; MG/1
TABLET SUBLINGUAL
Qty: 15 TABLET | Refills: 0 | Status: SHIPPED | OUTPATIENT
Start: 2020-08-20 | End: 2020-09-17 | Stop reason: SDUPTHER

## 2020-08-20 NOTE — PATIENT INSTRUCTIONS
Buprenorphine/Naloxone (Into the mouth)   Buprenorphine (bue-pre-NOR-feen), Naloxone (nal-OX-one)  Treats narcotic dependence  Brand Name(s): Bunavail, Suboxone, Zubsolv   There may be other brand names for this medicine  When This Medicine Should Not Be Used: This medicine is not right for everyone  Do not use it if you had an allergic reaction to buprenorphine or naloxone  How to Use This Medicine: Thin Sheet, Tablet  · Take your medicine as directed  Your dose may need to be changed several times to find what works best for you  · You must let the medicine dissolve  Never swallow the film or tablet  Your body may not absorb enough of the medicine if you swallow it  · Your health caregiver should show you how to use the medicine  If you do not understand, ask for help  It is important to use the medicine correctly  · Do not talk while the medicine is in your mouth  · Buccal film: Rinse your mouth with water to moisten it  Place the film against the inside of your cheek  If your doctor told you to use more than 1 film, place the second film inside your other cheek  Do not place more than 2 films inside of 1 cheek at a time  Do not move or touch the film  Do not eat or drink anything until the film is completely dissolved  · Sublingual tablet: Place the tablet under your tongue  If your doctor told you to use more than 1 tablet, place all of the tablets in different places under your tongue at the same time  You can use 2 tablets at a time until you have taken all of the medicine, if that is easier for you  Let the tablets dissolve completely in your mouth  Do not eat or drink anything until the tablets are completely dissolved  · Sublingual film: Drink some water to help moisten your mouth  Place the film under your tongue  If your doctor told you to use more than 1 film, place the second film on the opposite side from the first one  Do not move the film after you place it under your tongue   If you are supposed to use more than 2 films, use them the same way, but do not start until the first 2 films are completely dissolved  · Do not break, crush, chew, or cut the film or tablet  · This medicine should come with a Medication Guide  Ask your pharmacist for a copy if you do not have one  · Missed dose: Take a dose as soon as you remember  If it is almost time for your next dose, wait until then and take a regular dose  Do not take extra medicine to make up for a missed dose  · Store the medicine in a closed container at room temperature, away from heat, moisture, and direct light  Ask your pharmacist about the best way to dispose of medicine you do not use  Drugs and Foods to Avoid:   Ask your doctor or pharmacist before using any other medicine, including over-the-counter medicines, vitamins, and herbal products  · Do not use this medicine if you are using or have used an MAO inhibitor within the past 14 days  · Some medicines can affect how buprenorphine/naloxone works  Tell your doctor if you are using the following:   ¨ Carbamazepine, erythromycin, ketoconazole, mirtazapine, phenobarbital, phenytoin, rifampin, tramadol, or trazodone  ¨ Medicine to treat depression  ¨ Medicine to treat HIV/AIDS (including atazanavir, delavirdine, efavirenz, etravirine, nevirapine, ritonavir)  ¨ Phenothiazine medicine  · Do not drink alcohol while you are using this medicine  · Tell your doctor if you use anything else that makes you sleepy  Some examples are allergy medicine, narcotic pain medicine, and alcohol  Tell your doctor if you are also using butorphanol, nalbuphine, pentazocine, or a muscle relaxer    Warnings While Using This Medicine:   · Tell your doctor if you are pregnant or breastfeeding, or if you have kidney disease, liver disease (including hepatitis), lung or breathing problems, adrenal gland problems, an enlarged prostate, trouble urinating, gallbladder problems, low thyroid levels, stomach problems, or a history of depression, brain tumor, head injury, alcohol or drug abuse  · This medicine may cause the following problems:  ¨ High risk of overdose, which can lead to death  ¨ Respiratory depression (serious breathing problem that can be life-threatening)  ¨ Liver problems  ¨ Serotonin syndrome, when used with certain medicines  · This medicine may make you dizzy or drowsy  Do not drive or do anything that could be dangerous until you know how this medicine affects you  Stand or sit up slowly if you feel lightheaded or dizzy  · Tell any doctor or dentist who treats you that you are using this medicine  · This medicine can be habit-forming  Do not use more than your prescribed dose  Call your doctor if you think your medicine is not working  · Do not stop using this medicine suddenly  Your doctor will need to slowly decrease your dose before you stop it completely  · This medicine could cause infertility  Talk with your doctor before using this medicine if you plan to have children  · Keep all medicine out of the reach of children  Never share your medicine with anyone    Possible Side Effects While Using This Medicine:   Call your doctor right away if you notice any of these side effects:  · Allergic reaction: Itching or hives, swelling in your face or hands, swelling or tingling in your mouth or throat, chest tightness, trouble breathing  · Blue lips, fingernails, or skin  · Dark urine or pale stools, nausea, vomiting, loss of appetite, stomach pain, yellow skin or eyes  · Extreme dizziness or weakness, shallow breathing, sweating, seizures, cold or clammy skin  · Severe confusion, lightheadedness, dizziness, or fainting  · Trouble breathing or slow breathing  If you notice these less serious side effects, talk with your doctor:   · Headache, trouble sleeping  · Constipation or upset stomach  · Shaking, feeling hot or cold, runny nose, watery eyes, diarrhea, vomiting, and muscle aches  If you notice other side effects that you think are caused by this medicine, tell your doctor  Call your doctor for medical advice about side effects  You may report side effects to FDA at 9-502-YGG-1739  © 2017 [address] [employer] Information is for End User's use only and may not be sold, redistributed or otherwise used for commercial purposes  The above information is an  only  It is not intended as medical advice for individual conditions or treatments  Talk to your doctor, nurse or pharmacist before following any medical regimen to see if it is safe and effective for you

## 2020-08-20 NOTE — PROGRESS NOTES
Assessment/Plan:  Problem List Items Addressed This Visit        Other    Drug dependence ([facility] 75 ) - Primary    Encounter for monitoring Suboxone maintenance therapy    Relevant Orders    Suboxone    Toxicology screen, urine    Drug therapy continued    Heroin abuse ([facility] 75 )    IV drug abuse ([facility] 75 )           Diagnoses and all orders for this visit:    Drug dependence [facility])    Drug therapy continued    Encounter for monitoring Suboxone maintenance therapy  -     Suboxone  -     Toxicology screen, urine    Heroin abuse ([facility] 75 )    IV drug abuse ([facility] 75 )        No problem-specific Assessment & Plan notes found for this encounter  Scheduled Medication Review:  Pt's scheduled medication use was reviewed by myself/staff via the [employer] website  Pt's use has been found to be appropriate w/o any concerns for misuse by the patient  Pt's current conditions require continued scheduled medication use at this time  Future review for continued appropriate medication use and misuse will continue  A/P: Doing well and will continue 4mg tabs, dose 5/6 and continue with counseling  Reminded to keep meds safe and out of reach of children  RTC 4 weeks  Subjective: WM presents for f/u suboxone  Doing well and no c/o's  Not using and no withdraw  Does attend counseling  UDT obtained today  Tolerating the meds and no side effects  Patient ID: Cathay Crigler is a 30 y o  male  HPI    The following portions of the patient's history were reviewed and updated as appropriate:   He has a past medical history of Bipolar 2 disorder ([facility] 75 ), MAURICE (generalized anxiety disorder) (10/10/2018), Heroin abuse ([facility] 75 ) (10/10/2018), and Manic behavior ([facility] 75 )  ,  does not have any pertinent problems on file  ,   has a past surgical history that includes Mandible fracture surgery and [place] tooth extraction  ,  family history includes Hepatitis in his father; Multiple sclerosis in his mother; Seizures in his mother  ,   reports that he has quit smoking  He has a 3 00 pack-year smoking history  He has quit using smokeless tobacco   His smokeless tobacco use included chew  He reports current drug use  Drugs: Amphetamines, Cocaine, Hydrocodone, Heroin, Marijuana, and Oxycodone  He reports that he does not drink alcohol ,  is allergic to topamax [topiramate]     Current Outpatient Medications   Medication Sig Dispense Refill    buprenorphine-naloxone (SUBOXONE) 8-2 mg per SL tablet Half a tab sl Q day  15 tablet 0    cloNIDine (CATAPRES) 0 1 mg tablet Take 1 tablet (0 1 mg total) by mouth 3 (three) times a day 90 tablet 5    NARCAN 4 MG/0 1ML LIQD instill 1 spray in 1 NOSTRIL if needed for opioid overdose may re     (REFER TO PRESCRIPTION NOTES)  0    zolpidem (AMBIEN) 10 mg tablet Take 1 tablet (10 mg total) by mouth daily at bedtime as needed for sleep 30 tablet 0     No current facility-administered medications for this visit  Review of Systems   Constitutional: Negative for activity change, chills, diaphoresis, fatigue and fever  Respiratory: Negative for cough, chest tightness, shortness of breath and wheezing  Cardiovascular: Negative for chest pain, palpitations and leg swelling  Gastrointestinal: Negative for abdominal pain, constipation, diarrhea, nausea and vomiting  Genitourinary: Negative for difficulty urinating, dysuria and frequency  Musculoskeletal: Negative for arthralgias, gait problem and myalgias  Neurological: Negative for dizziness, seizures, syncope, weakness, light-headedness and headaches  Psychiatric/Behavioral: Negative for confusion, dysphoric mood, self-injury, sleep disturbance and suicidal ideas  The patient is not nervous/anxious          PHQ-9 Depression Screening    PHQ-9:    Frequency of the following problems over the past two weeks:             Objective:  Vitals:    08/20/20 1322   BP: 122/64   Pulse: 78   Temp: (!) 96 5 °F (35 8 °C)   SpO2: 98%   Weight: 65 8 kg (145 lb)   Height: 5' 7" (1 702 m) Body mass index is 22 71 kg/m²  Physical Exam  Vitals signs and nursing note reviewed  Constitutional:       General: He is not in acute distress  Appearance: Normal appearance  He is normal weight  HENT:      Head: Normocephalic and atraumatic  Mouth/Throat:      Mouth: Mucous membranes are moist    Eyes:      Extraocular Movements: Extraocular movements intact  Conjunctiva/sclera: Conjunctivae normal       Pupils: Pupils are equal, round, and reactive to light  Cardiovascular:      Rate and Rhythm: Normal rate and regular rhythm  Pulmonary:      Effort: No respiratory distress  Breath sounds: Normal breath sounds  No wheezing or rales  Abdominal:      General: Bowel sounds are normal  There is no distension  Palpations: Abdomen is soft  Tenderness: There is no abdominal tenderness  Neurological:      General: No focal deficit present  Mental Status: He is alert and oriented to person, place, and time  Mental status is at baseline  Psychiatric:         Mood and Affect: Mood normal          Behavior: Behavior normal          Thought Content:  Thought content normal          Judgment: Judgment normal

## 2020-08-24 LAB
BUPRENORPHINE UR CFM-MCNC: 66 NG/ML
BUPRENORPHINE UR QL CFM: NORMAL NG/ML
BUPRENORPHINE UR QL CFM: POSITIVE
BUPRENORPHINE+NOR UR QL: POSITIVE
NORBUPRENORPHINE UR CFM-MCNC: 18 NG/ML
NORBUPRENORPHINE UR QL CFM: POSITIVE

## 2020-09-15 DIAGNOSIS — G47.00 INSOMNIA, UNSPECIFIED TYPE: ICD-10-CM

## 2020-09-15 RX ORDER — ZOLPIDEM TARTRATE 10 MG/1
10 TABLET ORAL
Qty: 30 TABLET | Refills: 0 | Status: SHIPPED | OUTPATIENT
Start: 2020-09-15 | End: 2020-10-17 | Stop reason: SDUPTHER

## 2020-09-17 ENCOUNTER — OFFICE VISIT (OUTPATIENT)
Dept: INTERNAL MEDICINE CLINIC | Facility: CLINIC | Age: 25
End: 2020-09-17
Payer: COMMERCIAL

## 2020-09-17 VITALS
BODY MASS INDEX: 23.23 KG/M2 | WEIGHT: 148 LBS | HEIGHT: 67 IN | DIASTOLIC BLOOD PRESSURE: 60 MMHG | TEMPERATURE: 98.8 F | SYSTOLIC BLOOD PRESSURE: 116 MMHG | OXYGEN SATURATION: 97 % | HEART RATE: 86 BPM

## 2020-09-17 DIAGNOSIS — Z51.81 ENCOUNTER FOR MONITORING SUBOXONE MAINTENANCE THERAPY: ICD-10-CM

## 2020-09-17 DIAGNOSIS — Z79.899 ENCOUNTER FOR MONITORING SUBOXONE MAINTENANCE THERAPY: ICD-10-CM

## 2020-09-17 DIAGNOSIS — F19.10 IV DRUG ABUSE (HCC): ICD-10-CM

## 2020-09-17 DIAGNOSIS — F11.10 HEROIN ABUSE (HCC): ICD-10-CM

## 2020-09-17 DIAGNOSIS — F19.20 DRUG DEPENDENCE (HCC): Primary | ICD-10-CM

## 2020-09-17 DIAGNOSIS — Z79.899 DRUG THERAPY CONTINUED: ICD-10-CM

## 2020-09-17 PROCEDURE — 99213 OFFICE O/P EST LOW 20 MIN: CPT | Performed by: INTERNAL MEDICINE

## 2020-09-17 RX ORDER — BUPRENORPHINE HYDROCHLORIDE AND NALOXONE HYDROCHLORIDE DIHYDRATE 8; 2 MG/1; MG/1
TABLET SUBLINGUAL
Qty: 15 TABLET | Refills: 0 | Status: SHIPPED | OUTPATIENT
Start: 2020-09-17 | End: 2020-10-15

## 2020-09-17 NOTE — PROGRESS NOTES
Assessment/Plan:  Problem List Items Addressed This Visit        Other    Drug dependence ([place] [address] ) - Primary    Relevant Medications    buprenorphine-naloxone (SUBOXONE) 8-2 mg per SL tablet    Encounter for monitoring Suboxone maintenance therapy    Relevant Medications    buprenorphine-naloxone (SUBOXONE) 8-2 mg per SL tablet    Drug therapy continued    Relevant Medications    buprenorphine-naloxone (SUBOXONE) 8-2 mg per SL tablet    Heroin abuse (HCC)    Relevant Medications    buprenorphine-naloxone (SUBOXONE) 8-2 mg per SL tablet    IV drug abuse ([facility] 75 )    Relevant Medications    buprenorphine-naloxone (SUBOXONE) 8-2 mg per SL tablet           Diagnoses and all orders for this visit:    Drug dependence ([place] [address] )  -     buprenorphine-naloxone (SUBOXONE) 8-2 mg per SL tablet; Half a tab sl Q day  Drug therapy continued  -     buprenorphine-naloxone (SUBOXONE) 8-2 mg per SL tablet; Half a tab sl Q day  Encounter for monitoring Suboxone maintenance therapy  -     buprenorphine-naloxone (SUBOXONE) 8-2 mg per SL tablet; Half a tab sl Q day  Heroin abuse (HCC)  -     buprenorphine-naloxone (SUBOXONE) 8-2 mg per SL tablet; Half a tab sl Q day  IV drug abuse (HCC)  -     buprenorphine-naloxone (SUBOXONE) 8-2 mg per SL tablet; Half a tab sl Q day  No problem-specific Assessment & Plan notes found for this encounter  Scheduled Medication Review:  Pt's scheduled medication use was reviewed by myself/staff via the [employer] website  Pt's use has been found to be appropriate w/o any concerns for misuse by the patient  Pt's current conditions require continued scheduled medication use at this time  Future review for continued appropriate medication use and misuse will continue  A/P: Doing well and will continue 4mg tabs, dose 6/6 and consider weaning next visit  Continue with counseling  Reminded to keep meds safe and out of reach of children  RTC 4 weeks  Subjective: WM presents for f/u suboxone  Doing well and no c/o's  Not using and no withdraw  Does attend counseling  UDT not due today  Tolerating the meds and no side effects  Patient ID: Rebeca Hilliard is a 30 y o  male  HPI    The following portions of the patient's history were reviewed and updated as appropriate:   He has a past medical history of Bipolar 2 disorder ([facility] 75 ), MAURICE (generalized anxiety disorder) (10/10/2018), Heroin abuse ([place] [address] ) (10/10/2018), and Manic behavior ([place] [address] )  ,  does not have any pertinent problems on file  ,   has a past surgical history that includes Mandible fracture surgery and [place] tooth extraction  ,  family history includes Hepatitis in his father; Multiple sclerosis in his mother; Seizures in his mother  ,   reports that he has quit smoking  He has a 3 00 pack-year smoking history  He has quit using smokeless tobacco   His smokeless tobacco use included chew  He reports current drug use  Drugs: Amphetamines, Cocaine, Hydrocodone, Heroin, Marijuana, and Oxycodone  He reports that he does not drink alcohol ,  is allergic to topamax [topiramate]     Current Outpatient Medications   Medication Sig Dispense Refill    buprenorphine-naloxone (SUBOXONE) 8-2 mg per SL tablet Half a tab sl Q day  15 tablet 0    cloNIDine (CATAPRES) 0 1 mg tablet Take 1 tablet (0 1 mg total) by mouth 3 (three) times a day 90 tablet 5    NARCAN 4 MG/0 1ML LIQD instill 1 spray in 1 NOSTRIL if needed for opioid overdose may re     (REFER TO PRESCRIPTION NOTES)  0    zolpidem (AMBIEN) 10 mg tablet Take 1 tablet (10 mg total) by mouth daily at bedtime as needed for sleep 30 tablet 0     No current facility-administered medications for this visit  Review of Systems   Constitutional: Negative for activity change, chills, diaphoresis, fatigue and fever  Respiratory: Negative for cough, chest tightness, shortness of breath and wheezing  Cardiovascular: Negative for chest pain, palpitations and leg swelling     Gastrointestinal: Negative for abdominal pain, constipation, diarrhea, nausea and vomiting  Genitourinary: Negative for difficulty urinating, dysuria and frequency  Musculoskeletal: Negative for arthralgias, gait problem and myalgias  Neurological: Negative for dizziness, seizures, syncope, weakness, light-headedness and headaches  Psychiatric/Behavioral: Negative for confusion, dysphoric mood, self-injury, sleep disturbance and suicidal ideas  The patient is not nervous/anxious  PHQ-9 Depression Screening    PHQ-9:    Frequency of the following problems over the past two weeks:             Objective:  Vitals:    09/17/20 1148   BP: 116/60   Pulse: 86   Temp: 98 8 °F (37 1 °C)   SpO2: 97%   Weight: 67 1 kg (148 lb)   Height: 5' 7" (1 702 m)     Body mass index is 23 18 kg/m²  Physical Exam  Vitals signs and nursing note reviewed  Constitutional:       General: He is not in acute distress  Appearance: Normal appearance  HENT:      Head: Normocephalic and atraumatic  Mouth/Throat:      Mouth: Mucous membranes are moist    Eyes:      Extraocular Movements: Extraocular movements intact  Conjunctiva/sclera: Conjunctivae normal       Pupils: Pupils are equal, round, and reactive to light  Cardiovascular:      Rate and Rhythm: Normal rate and regular rhythm  Heart sounds: Normal heart sounds  Pulmonary:      Effort: Pulmonary effort is normal  No respiratory distress  Breath sounds: Normal breath sounds  No wheezing or rales  Abdominal:      General: Bowel sounds are normal  There is no distension  Palpations: Abdomen is soft  Tenderness: There is no abdominal tenderness  Neurological:      General: No focal deficit present  Mental Status: He is alert and oriented to person, place, and time  Mental status is at baseline  Psychiatric:         Mood and Affect: Mood normal          Behavior: Behavior normal          Thought Content:  Thought content normal  Judgment: Judgment normal

## 2020-09-17 NOTE — PATIENT INSTRUCTIONS
Buprenorphine/Naloxone (Into the mouth)   Buprenorphine (bue-pre-NOR-feen), Naloxone (nal-OX-one)  Treats narcotic dependence  Brand Name(s): Bunavail, Suboxone, Zubsolv   There may be other brand names for this medicine  When This Medicine Should Not Be Used: This medicine is not right for everyone  Do not use it if you had an allergic reaction to buprenorphine or naloxone  How to Use This Medicine: Thin Sheet, Tablet  · Take your medicine as directed  Your dose may need to be changed several times to find what works best for you  · You must let the medicine dissolve  Never swallow the film or tablet  Your body may not absorb enough of the medicine if you swallow it  · Your health caregiver should show you how to use the medicine  If you do not understand, ask for help  It is important to use the medicine correctly  · Do not talk while the medicine is in your mouth  · Buccal film: Rinse your mouth with water to moisten it  Place the film against the inside of your cheek  If your doctor told you to use more than 1 film, place the second film inside your other cheek  Do not place more than 2 films inside of 1 cheek at a time  Do not move or touch the film  Do not eat or drink anything until the film is completely dissolved  · Sublingual tablet: Place the tablet under your tongue  If your doctor told you to use more than 1 tablet, place all of the tablets in different places under your tongue at the same time  You can use 2 tablets at a time until you have taken all of the medicine, if that is easier for you  Let the tablets dissolve completely in your mouth  Do not eat or drink anything until the tablets are completely dissolved  · Sublingual film: Drink some water to help moisten your mouth  Place the film under your tongue  If your doctor told you to use more than 1 film, place the second film on the opposite side from the first one  Do not move the film after you place it under your tongue   If you are supposed to use more than 2 films, use them the same way, but do not start until the first 2 films are completely dissolved  · Do not break, crush, chew, or cut the film or tablet  · This medicine should come with a Medication Guide  Ask your pharmacist for a copy if you do not have one  · Missed dose: Take a dose as soon as you remember  If it is almost time for your next dose, wait until then and take a regular dose  Do not take extra medicine to make up for a missed dose  · Store the medicine in a closed container at room temperature, away from heat, moisture, and direct light  Ask your pharmacist about the best way to dispose of medicine you do not use  Drugs and Foods to Avoid:   Ask your doctor or pharmacist before using any other medicine, including over-the-counter medicines, vitamins, and herbal products  · Do not use this medicine if you are using or have used an MAO inhibitor within the past 14 days  · Some medicines can affect how buprenorphine/naloxone works  Tell your doctor if you are using the following:   ¨ Carbamazepine, erythromycin, ketoconazole, mirtazapine, phenobarbital, phenytoin, rifampin, tramadol, or trazodone  ¨ Medicine to treat depression  ¨ Medicine to treat HIV/AIDS (including atazanavir, delavirdine, efavirenz, etravirine, nevirapine, ritonavir)  ¨ Phenothiazine medicine  · Do not drink alcohol while you are using this medicine  · Tell your doctor if you use anything else that makes you sleepy  Some examples are allergy medicine, narcotic pain medicine, and alcohol  Tell your doctor if you are also using butorphanol, nalbuphine, pentazocine, or a muscle relaxer    Warnings While Using This Medicine:   · Tell your doctor if you are pregnant or breastfeeding, or if you have kidney disease, liver disease (including hepatitis), lung or breathing problems, adrenal gland problems, an enlarged prostate, trouble urinating, gallbladder problems, low thyroid levels, stomach problems, or a history of depression, brain tumor, head injury, alcohol or drug abuse  · This medicine may cause the following problems:  ¨ High risk of overdose, which can lead to death  ¨ Respiratory depression (serious breathing problem that can be life-threatening)  ¨ Liver problems  ¨ Serotonin syndrome, when used with certain medicines  · This medicine may make you dizzy or drowsy  Do not drive or do anything that could be dangerous until you know how this medicine affects you  Stand or sit up slowly if you feel lightheaded or dizzy  · Tell any doctor or dentist who treats you that you are using this medicine  · This medicine can be habit-forming  Do not use more than your prescribed dose  Call your doctor if you think your medicine is not working  · Do not stop using this medicine suddenly  Your doctor will need to slowly decrease your dose before you stop it completely  · This medicine could cause infertility  Talk with your doctor before using this medicine if you plan to have children  · Keep all medicine out of the reach of children  Never share your medicine with anyone    Possible Side Effects While Using This Medicine:   Call your doctor right away if you notice any of these side effects:  · Allergic reaction: Itching or hives, swelling in your face or hands, swelling or tingling in your mouth or throat, chest tightness, trouble breathing  · Blue lips, fingernails, or skin  · Dark urine or pale stools, nausea, vomiting, loss of appetite, stomach pain, yellow skin or eyes  · Extreme dizziness or weakness, shallow breathing, sweating, seizures, cold or clammy skin  · Severe confusion, lightheadedness, dizziness, or fainting  · Trouble breathing or slow breathing  If you notice these less serious side effects, talk with your doctor:   · Headache, trouble sleeping  · Constipation or upset stomach  · Shaking, feeling hot or cold, runny nose, watery eyes, diarrhea, vomiting, and muscle aches  If you notice other side effects that you think are caused by this medicine, tell your doctor  Call your doctor for medical advice about side effects  You may report side effects to FDA at 9-916-HTB-0513  © 2017 [address] Information is for End User's use only and may not be sold, redistributed or otherwise used for commercial purposes  The above information is an  only  It is not intended as medical advice for individual conditions or treatments  Talk to your doctor, nurse or pharmacist before following any medical regimen to see if it is safe and effective for you

## 2020-10-15 ENCOUNTER — OFFICE VISIT (OUTPATIENT)
Dept: INTERNAL MEDICINE CLINIC | Facility: CLINIC | Age: 25
End: 2020-10-15
Payer: COMMERCIAL

## 2020-10-15 VITALS
BODY MASS INDEX: 23.15 KG/M2 | OXYGEN SATURATION: 98 % | WEIGHT: 147.5 LBS | HEART RATE: 72 BPM | TEMPERATURE: 97.8 F | DIASTOLIC BLOOD PRESSURE: 80 MMHG | SYSTOLIC BLOOD PRESSURE: 140 MMHG | HEIGHT: 67 IN

## 2020-10-15 DIAGNOSIS — F19.20 DRUG DEPENDENCE (HCC): Primary | ICD-10-CM

## 2020-10-15 DIAGNOSIS — F11.10 HEROIN ABUSE (HCC): ICD-10-CM

## 2020-10-15 DIAGNOSIS — Z79.899 ENCOUNTER FOR MONITORING SUBOXONE MAINTENANCE THERAPY: ICD-10-CM

## 2020-10-15 DIAGNOSIS — Z51.81 ENCOUNTER FOR MONITORING SUBOXONE MAINTENANCE THERAPY: ICD-10-CM

## 2020-10-15 DIAGNOSIS — Z79.899 DRUG THERAPY CONTINUED: ICD-10-CM

## 2020-10-15 PROCEDURE — 3079F DIAST BP 80-89 MM HG: CPT | Performed by: INTERNAL MEDICINE

## 2020-10-15 PROCEDURE — 99213 OFFICE O/P EST LOW 20 MIN: CPT | Performed by: INTERNAL MEDICINE

## 2020-10-15 PROCEDURE — 1036F TOBACCO NON-USER: CPT | Performed by: INTERNAL MEDICINE

## 2020-10-15 RX ORDER — BUPRENORPHINE HYDROCHLORIDE AND NALOXONE HYDROCHLORIDE DIHYDRATE 2; .5 MG/1; MG/1
1 TABLET SUBLINGUAL DAILY
Qty: 30 TABLET | Refills: 0 | Status: SHIPPED | OUTPATIENT
Start: 2020-10-15 | End: 2020-11-13 | Stop reason: SDUPTHER

## 2020-10-16 DIAGNOSIS — G47.00 INSOMNIA, UNSPECIFIED TYPE: ICD-10-CM

## 2020-10-16 DIAGNOSIS — I10 ESSENTIAL HYPERTENSION: ICD-10-CM

## 2020-10-17 RX ORDER — CLONIDINE HYDROCHLORIDE 0.1 MG/1
0.1 TABLET ORAL 3 TIMES DAILY
Qty: 90 TABLET | Refills: 5 | Status: SHIPPED | OUTPATIENT
Start: 2020-10-17 | End: 2020-12-15 | Stop reason: SDUPTHER

## 2020-10-17 RX ORDER — ZOLPIDEM TARTRATE 10 MG/1
10 TABLET ORAL
Qty: 30 TABLET | Refills: 0 | Status: SHIPPED | OUTPATIENT
Start: 2020-10-17 | End: 2020-11-17 | Stop reason: SDUPTHER

## 2020-11-13 ENCOUNTER — OFFICE VISIT (OUTPATIENT)
Dept: INTERNAL MEDICINE CLINIC | Facility: CLINIC | Age: 25
End: 2020-11-13
Payer: COMMERCIAL

## 2020-11-13 VITALS
BODY MASS INDEX: 23.39 KG/M2 | OXYGEN SATURATION: 97 % | DIASTOLIC BLOOD PRESSURE: 68 MMHG | SYSTOLIC BLOOD PRESSURE: 138 MMHG | HEIGHT: 67 IN | WEIGHT: 149 LBS | TEMPERATURE: 98 F | HEART RATE: 80 BPM

## 2020-11-13 DIAGNOSIS — Z23 ENCOUNTER FOR VACCINATION: ICD-10-CM

## 2020-11-13 DIAGNOSIS — Z51.81 ENCOUNTER FOR MONITORING SUBOXONE MAINTENANCE THERAPY: ICD-10-CM

## 2020-11-13 DIAGNOSIS — Z79.899 ENCOUNTER FOR MONITORING SUBOXONE MAINTENANCE THERAPY: ICD-10-CM

## 2020-11-13 DIAGNOSIS — F11.10 HEROIN ABUSE (HCC): ICD-10-CM

## 2020-11-13 DIAGNOSIS — Z79.899 DRUG THERAPY CONTINUED: ICD-10-CM

## 2020-11-13 DIAGNOSIS — F19.20 DRUG DEPENDENCE (HCC): Primary | ICD-10-CM

## 2020-11-13 PROCEDURE — 3078F DIAST BP <80 MM HG: CPT | Performed by: INTERNAL MEDICINE

## 2020-11-13 PROCEDURE — 80307 DRUG TEST PRSMV CHEM ANLYZR: CPT | Performed by: INTERNAL MEDICINE

## 2020-11-13 PROCEDURE — 90471 IMMUNIZATION ADMIN: CPT | Performed by: INTERNAL MEDICINE

## 2020-11-13 PROCEDURE — 90686 IIV4 VACC NO PRSV 0.5 ML IM: CPT | Performed by: INTERNAL MEDICINE

## 2020-11-13 PROCEDURE — 1036F TOBACCO NON-USER: CPT | Performed by: INTERNAL MEDICINE

## 2020-11-13 PROCEDURE — 3008F BODY MASS INDEX DOCD: CPT | Performed by: INTERNAL MEDICINE

## 2020-11-13 PROCEDURE — 99213 OFFICE O/P EST LOW 20 MIN: CPT | Performed by: INTERNAL MEDICINE

## 2020-11-13 PROCEDURE — 3075F SYST BP GE 130 - 139MM HG: CPT | Performed by: INTERNAL MEDICINE

## 2020-11-13 RX ORDER — BUPRENORPHINE HYDROCHLORIDE AND NALOXONE HYDROCHLORIDE DIHYDRATE 2; .5 MG/1; MG/1
1 TABLET SUBLINGUAL DAILY
Qty: 30 TABLET | Refills: 0 | Status: SHIPPED | OUTPATIENT
Start: 2020-11-13 | End: 2020-12-11 | Stop reason: SDUPTHER

## 2020-11-17 DIAGNOSIS — G47.00 INSOMNIA, UNSPECIFIED TYPE: ICD-10-CM

## 2020-11-17 RX ORDER — ZOLPIDEM TARTRATE 10 MG/1
10 TABLET ORAL
Qty: 30 TABLET | Refills: 0 | Status: SHIPPED | OUTPATIENT
Start: 2020-11-17 | End: 2020-12-15 | Stop reason: SDUPTHER

## 2020-11-19 LAB
BUPRENORPHINE UR CFM-MCNC: 24 NG/ML
BUPRENORPHINE UR QL CFM: NORMAL NG/ML
BUPRENORPHINE UR QL CFM: POSITIVE
BUPRENORPHINE+NOR UR QL: POSITIVE
NORBUPRENORPHINE UR CFM-MCNC: 16 NG/ML
NORBUPRENORPHINE UR QL CFM: POSITIVE

## 2020-12-11 ENCOUNTER — OFFICE VISIT (OUTPATIENT)
Dept: INTERNAL MEDICINE CLINIC | Facility: CLINIC | Age: 25
End: 2020-12-11
Payer: COMMERCIAL

## 2020-12-11 VITALS
HEART RATE: 73 BPM | HEIGHT: 67 IN | WEIGHT: 149.38 LBS | TEMPERATURE: 97.3 F | OXYGEN SATURATION: 98 % | DIASTOLIC BLOOD PRESSURE: 74 MMHG | SYSTOLIC BLOOD PRESSURE: 118 MMHG | BODY MASS INDEX: 23.45 KG/M2

## 2020-12-11 DIAGNOSIS — Z79.899 DRUG THERAPY CONTINUED: ICD-10-CM

## 2020-12-11 DIAGNOSIS — Z51.81 ENCOUNTER FOR MONITORING SUBOXONE MAINTENANCE THERAPY: ICD-10-CM

## 2020-12-11 DIAGNOSIS — F19.20 DRUG DEPENDENCE (HCC): Primary | ICD-10-CM

## 2020-12-11 DIAGNOSIS — Z79.899 ENCOUNTER FOR MONITORING SUBOXONE MAINTENANCE THERAPY: ICD-10-CM

## 2020-12-11 DIAGNOSIS — F11.10 HEROIN ABUSE (HCC): ICD-10-CM

## 2020-12-11 PROCEDURE — 99213 OFFICE O/P EST LOW 20 MIN: CPT | Performed by: INTERNAL MEDICINE

## 2020-12-11 PROCEDURE — 1036F TOBACCO NON-USER: CPT | Performed by: INTERNAL MEDICINE

## 2020-12-11 PROCEDURE — 3008F BODY MASS INDEX DOCD: CPT | Performed by: INTERNAL MEDICINE

## 2020-12-11 PROCEDURE — 3078F DIAST BP <80 MM HG: CPT | Performed by: INTERNAL MEDICINE

## 2020-12-11 PROCEDURE — 3074F SYST BP LT 130 MM HG: CPT | Performed by: INTERNAL MEDICINE

## 2020-12-11 RX ORDER — BUPRENORPHINE HYDROCHLORIDE AND NALOXONE HYDROCHLORIDE DIHYDRATE 2; .5 MG/1; MG/1
1 TABLET SUBLINGUAL DAILY
Qty: 30 TABLET | Refills: 0 | Status: SHIPPED | OUTPATIENT
Start: 2020-12-11 | End: 2021-01-07 | Stop reason: SDUPTHER

## 2020-12-15 DIAGNOSIS — I10 ESSENTIAL HYPERTENSION: ICD-10-CM

## 2020-12-15 DIAGNOSIS — G47.00 INSOMNIA, UNSPECIFIED TYPE: ICD-10-CM

## 2020-12-16 RX ORDER — CLONIDINE HYDROCHLORIDE 0.1 MG/1
0.1 TABLET ORAL 3 TIMES DAILY
Qty: 90 TABLET | Refills: 5 | Status: SHIPPED | OUTPATIENT
Start: 2020-12-16 | End: 2021-04-21 | Stop reason: SDUPTHER

## 2020-12-16 RX ORDER — ZOLPIDEM TARTRATE 10 MG/1
10 TABLET ORAL
Qty: 30 TABLET | Refills: 0 | Status: SHIPPED | OUTPATIENT
Start: 2020-12-16 | End: 2021-01-15 | Stop reason: SDUPTHER

## 2021-01-07 ENCOUNTER — OFFICE VISIT (OUTPATIENT)
Dept: INTERNAL MEDICINE CLINIC | Facility: CLINIC | Age: 26
End: 2021-01-07
Payer: COMMERCIAL

## 2021-01-07 VITALS
WEIGHT: 148.5 LBS | OXYGEN SATURATION: 97 % | HEART RATE: 79 BPM | BODY MASS INDEX: 23.31 KG/M2 | SYSTOLIC BLOOD PRESSURE: 112 MMHG | TEMPERATURE: 97.5 F | DIASTOLIC BLOOD PRESSURE: 76 MMHG | HEIGHT: 67 IN

## 2021-01-07 DIAGNOSIS — Z79.899 DRUG THERAPY CONTINUED: ICD-10-CM

## 2021-01-07 DIAGNOSIS — F19.20 DRUG DEPENDENCE (HCC): Primary | ICD-10-CM

## 2021-01-07 DIAGNOSIS — Z79.899 ENCOUNTER FOR MONITORING SUBOXONE MAINTENANCE THERAPY: ICD-10-CM

## 2021-01-07 DIAGNOSIS — Z51.81 ENCOUNTER FOR MONITORING SUBOXONE MAINTENANCE THERAPY: ICD-10-CM

## 2021-01-07 DIAGNOSIS — F11.10 HEROIN ABUSE (HCC): ICD-10-CM

## 2021-01-07 PROCEDURE — 99213 OFFICE O/P EST LOW 20 MIN: CPT | Performed by: INTERNAL MEDICINE

## 2021-01-07 PROCEDURE — 1036F TOBACCO NON-USER: CPT | Performed by: INTERNAL MEDICINE

## 2021-01-07 PROCEDURE — 3008F BODY MASS INDEX DOCD: CPT | Performed by: INTERNAL MEDICINE

## 2021-01-07 PROCEDURE — 3078F DIAST BP <80 MM HG: CPT | Performed by: INTERNAL MEDICINE

## 2021-01-07 PROCEDURE — 80307 DRUG TEST PRSMV CHEM ANLYZR: CPT | Performed by: INTERNAL MEDICINE

## 2021-01-07 PROCEDURE — 3074F SYST BP LT 130 MM HG: CPT | Performed by: INTERNAL MEDICINE

## 2021-01-07 RX ORDER — BUPRENORPHINE HYDROCHLORIDE AND NALOXONE HYDROCHLORIDE DIHYDRATE 2; .5 MG/1; MG/1
1 TABLET SUBLINGUAL DAILY
Qty: 30 TABLET | Refills: 0 | Status: SHIPPED | OUTPATIENT
Start: 2021-01-07 | End: 2021-02-04 | Stop reason: SDUPTHER

## 2021-01-07 NOTE — PROGRESS NOTES
Assessment/Plan:  Problem List Items Addressed This Visit        Other    Drug dependence ([facility] ) - Primary    Drug therapy continued    Encounter for monitoring Suboxone maintenance therapy           Diagnoses and all orders for this visit:    Drug dependence ([facility] )    Encounter for monitoring Suboxone maintenance therapy    Drug therapy continued        No problem-specific Assessment & Plan notes found for this encounter  Scheduled Medication Review:  Pt's scheduled medication use was reviewed by myself/staff via the [employer] website  Pt's use has been found to be appropriate w/o any concerns for misuse by the patient  Pt's current conditions require continued scheduled medication use at this time  Future review for continued appropriate medication use and misuse will continue  A/P: Doing well and will continue 2mg tabs, dose 5/6 and continue with counseling  Reminded to keep meds safe and out of reach of children  RTC 4 weeks  Subjective: WM presents for f/u suboxone  Doing well and no c/o's  Not using and no withdraw  Does attend counseling  UDT obtained today  Tolerating the meds and no side effects  Patient ID: Nichol Sewell is a 30 y o  male  HPI    The following portions of the patient's history were reviewed and updated as appropriate:   He has a past medical history of Bipolar 2 disorder ([facility] ), MAURICE (generalized anxiety disorder) (10/10/2018), Heroin abuse ([facility] ) (10/10/2018), and Manic behavior ([facility] )  ,  does not have any pertinent problems on file  ,   has a past surgical history that includes Mandible fracture surgery and [place] tooth extraction  ,  family history includes Hepatitis in his father; Multiple sclerosis in his mother; Seizures in his mother  ,   reports that he has quit smoking  He has a 3 00 pack-year smoking history  He has quit using smokeless tobacco   His smokeless tobacco use included chew  He reports current drug use   Drugs: Amphetamines, Cocaine, Hydrocodone, Heroin, Marijuana, and Oxycodone  He reports that he does not drink alcohol ,  is allergic to topamax [topiramate]     Current Outpatient Medications   Medication Sig Dispense Refill    buprenorphine-naloxone (SUBOXONE) 2-0 5 mg per SL tablet Place 1 tablet under the tongue daily 30 tablet 0    cloNIDine (CATAPRES) 0 1 mg tablet Take 1 tablet (0 1 mg total) by mouth 3 (three) times a day 90 tablet 5    NARCAN 4 MG/0 1ML LIQD instill 1 spray in 1 NOSTRIL if needed for opioid overdose may re     (REFER TO PRESCRIPTION NOTES)  0    zolpidem (AMBIEN) 10 mg tablet Take 1 tablet (10 mg total) by mouth daily at bedtime as needed for sleep 30 tablet 0     No current facility-administered medications for this visit  Review of Systems   Constitutional: Negative for activity change, chills, diaphoresis, fatigue and fever  Respiratory: Negative for cough, chest tightness, shortness of breath and wheezing  Cardiovascular: Negative for chest pain, palpitations and leg swelling  Gastrointestinal: Negative for abdominal pain, constipation, diarrhea, nausea and vomiting  Genitourinary: Negative for difficulty urinating, dysuria and frequency  Musculoskeletal: Negative for arthralgias, gait problem and myalgias  Neurological: Negative for dizziness, seizures, syncope, weakness, light-headedness and headaches  Psychiatric/Behavioral: Negative for confusion, dysphoric mood, self-injury, sleep disturbance and suicidal ideas  The patient is not nervous/anxious  PHQ-9 Depression Screening    PHQ-9:   Frequency of the following problems over the past two weeks:            Objective:  Vitals:    01/07/21 1325   BP: 112/76   Pulse: 79   Temp: 97 5 °F (36 4 °C)   SpO2: 97%   Weight: 67 4 kg (148 lb 8 oz)   Height: 5' 7" (1 702 m)     Body mass index is 23 26 kg/m²  Physical Exam  Vitals signs and nursing note reviewed  Constitutional:       General: He is not in acute distress       Appearance: Normal appearance  He is not ill-appearing  HENT:      Head: Normocephalic and atraumatic  Mouth/Throat:      Mouth: Mucous membranes are moist    Eyes:      Conjunctiva/sclera: Conjunctivae normal       Pupils: Pupils are equal, round, and reactive to light  Cardiovascular:      Rate and Rhythm: Normal rate and regular rhythm  Heart sounds: Normal heart sounds  Pulmonary:      Effort: Pulmonary effort is normal  No respiratory distress  Breath sounds: Normal breath sounds  No wheezing or rales  Abdominal:      General: Bowel sounds are normal  There is no distension  Palpations: Abdomen is soft  Tenderness: There is no abdominal tenderness  Neurological:      General: No focal deficit present  Mental Status: He is alert and oriented to person, place, and time  Mental status is at baseline  Psychiatric:         Mood and Affect: Mood normal          Behavior: Behavior normal          Thought Content:  Thought content normal          Judgment: Judgment normal

## 2021-01-07 NOTE — PATIENT INSTRUCTIONS
Buprenorphine/Naloxone (Into the mouth)   Buprenorphine (bue-pre-NOR-feen), Naloxone (nal-OX-one)  Treats narcotic dependence  Brand Name(s): Suboxone, Zubsolv   There may be other brand names for this medicine  When This Medicine Should Not Be Used: This medicine is not right for everyone  Do not use it if you had an allergic reaction to buprenorphine or naloxone  How to Use This Medicine: Thin Sheet, Tablet  · Take your medicine as directed  Your dose may need to be changed several times to find what works best for you  · You must let the medicine dissolve  Never swallow the film or tablet  Your body may not absorb enough of the medicine if you swallow it  · Your health caregiver should show you how to use the medicine  If you do not understand, ask for help  It is important to use the medicine correctly  · Do not talk while the medicine is inside your mouth  · Buccal film: Rinse your mouth with water to moisten it  Place the film against the inside of your cheek  If your doctor told you to use more than 1 film, place the second film inside your other cheek  Do not place more than 2 films inside of 1 cheek at a time  Do not move or touch the film  Do not eat or drink anything until the film is completely dissolved  · Sublingual tablet: Place the tablet under your tongue  If your doctor told you to use more than 1 tablet, place all of the tablets in different places under your tongue at the same time  You can use 2 tablets at a time until you have taken all of the medicine, if that is easier for you  Let the tablets dissolve completely in your mouth  Do not eat or drink anything until the tablets are completely dissolved  · Sublingual film: Drink some water to help moisten your mouth  Place the film under your tongue  If your doctor told you to use more than 1 film, place the second film on the opposite side from the first one  Do not move the film after you placed it under your tongue   If you are supposed to use more than 2 films, use them the same way, but do not start until the first 2 films are completely dissolved  · Do not break, crush, chew, or cut the film or tablet  · This medicine should come with a Medication Guide  Ask your pharmacist for a copy if you do not have one  · Missed dose: Take a dose as soon as you remember  If it is almost time for your next dose, wait until then and take a regular dose  Do not take extra medicine to make up for a missed dose  · Store the medicine in a closed container at room temperature, away from heat, moisture, and direct light  Drop off any unused narcotic medicine at a drug take-back location right away  If you do not have a drug take-back location near you, flush any unused narcotic medicine down the toilet  Check your local drug store and clinics for take-back locations  You can also check the [employer] web site for locations  Here is the link to the [facility] safe disposal of medicines [employer] com ee  Drugs and Foods to Avoid:   Ask your doctor or pharmacist before using any other medicine, including over-the-counter medicines, vitamins, and herbal products  · Do not use this medicine if you are using or have used an MAO inhibitor within the past 14 days  · Some medicines can affect how buprenorphine/naloxone works  Tell your doctor if you are using the following:   ? Carbamazepine, cyclobenzaprine, erythromycin, ketoconazole, metaxalone, mirtazapine, phenobarbital, phenytoin, rifampin, tramadol, trazodone  ? Diuretic (water pill)  ? Medicine to treat depression, anxiety, and mental health illness  ? Medicine to treat HIV/AIDS (including atazanavir, delavirdine, efavirenz, etravirine, nevirapine, ritonavir)  ? Phenothiazine medicine  ?  Triptan medicine to treat migraine headaches  · Do not drink alcohol while you are using this medicine  · Tell your doctor if you use anything else that makes you sleepy  Some examples are allergy medicine, narcotic pain medicine, and alcohol  Tell your doctor if you are also using butorphanol, nalbuphine, pentazocine, or a muscle relaxer  Warnings While Using This Medicine:   · Tell your doctor if you are pregnant or breastfeeding, or if you have kidney disease, liver disease (including hepatitis), lung or breathing problems (including sleep apnea), adrenal gland problems, an enlarged prostate, trouble urinating, gallbladder problems, thyroid problems, stomach problems, or a history of depression, brain tumor, head injury, alcohol or drug abuse  · This medicine may cause the following problems:  ? High risk of overdose, which can lead to death  ? Respiratory depression (serious breathing problem that can be life-threatening)  ? Sleep-related breathing problems (including sleep apnea, sleep-related hypoxemia)  ? Liver problems  ? Serotonin syndrome, when used with certain medicines  · This medicine may make you dizzy or drowsy  Do not drive or do anything else that could be dangerous until you know how this medicine affects you  Stand or sit up slowly if you feel lightheaded or dizzy  · Tell any doctor or dentist who treats you that you are using this medicine  · This medicine can be habit-forming  Do not use more than your prescribed dose  Call your doctor if you think your medicine is not working  · This medicine may cause constipation, especially with long-term use  Ask your doctor if you should use a laxative to prevent and treat constipation  · Do not stop using this medicine suddenly  Your doctor will need to slowly decrease your dose before you stop it completely  · This medicine could cause infertility  Talk with your doctor before using this medicine if you plan to have children  · Your doctor will do lab tests at regular visits to check on the effects of this medicine   Keep all appointments  · Keep all medicine out of the reach of children  Never share your medicine with anyone  Possible Side Effects While Using This Medicine:   Call your doctor right away if you notice any of these side effects:  · Allergic reaction: Itching or hives, swelling in your face or hands, swelling or tingling in your mouth or throat, chest tightness, trouble breathing  · Blue lips, fingernails, or skin  · Changes in skin color, dark freckles  · Cold feeling, weakness or tiredness, weight loss  · Dark urine or pale stools, nausea, vomiting, loss of appetite, stomach pain, yellow skin or eyes  · Extreme dizziness or weakness, shallow breathing, sweating, seizures, cold or clammy skin  · Severe confusion, lightheadedness, dizziness, or fainting  · Trouble breathing or slow breathing  If you notice these less serious side effects, talk with your doctor:   · Constipation or upset stomach  · Headache, trouble sleeping  · Shaking, runny nose, watery eyes, diarrhea, muscle aches  If you notice other side effects that you think are caused by this medicine, tell your doctor  Call your doctor for medical advice about side effects  You may report side effects to FDA at 8-334-FDA-3587  © Copyright [address] Information is for End User's use only and may not be sold, redistributed or otherwise used for commercial purposes  The above information is an  only  It is not intended as medical advice for individual conditions or treatments  Talk to your doctor, nurse or pharmacist before following any medical regimen to see if it is safe and effective for you

## 2021-01-11 LAB
BUPRENORPHINE UR CFM-MCNC: 242 NG/ML
BUPRENORPHINE UR QL CFM: NORMAL NG/ML
BUPRENORPHINE UR QL CFM: POSITIVE
BUPRENORPHINE+NOR UR QL: POSITIVE
NORBUPRENORPHINE UR CFM-MCNC: 108 NG/ML
NORBUPRENORPHINE UR QL CFM: POSITIVE

## 2021-01-15 DIAGNOSIS — G47.00 INSOMNIA, UNSPECIFIED TYPE: ICD-10-CM

## 2021-01-15 RX ORDER — ZOLPIDEM TARTRATE 10 MG/1
10 TABLET ORAL
Qty: 30 TABLET | Refills: 0 | Status: SHIPPED | OUTPATIENT
Start: 2021-01-15 | End: 2021-02-12 | Stop reason: SDUPTHER

## 2021-02-04 ENCOUNTER — OFFICE VISIT (OUTPATIENT)
Dept: INTERNAL MEDICINE CLINIC | Facility: CLINIC | Age: 26
End: 2021-02-04
Payer: COMMERCIAL

## 2021-02-04 VITALS
HEART RATE: 74 BPM | WEIGHT: 148.5 LBS | BODY MASS INDEX: 23.31 KG/M2 | TEMPERATURE: 98.9 F | OXYGEN SATURATION: 98 % | HEIGHT: 67 IN | SYSTOLIC BLOOD PRESSURE: 118 MMHG | DIASTOLIC BLOOD PRESSURE: 76 MMHG

## 2021-02-04 DIAGNOSIS — Z79.899 DRUG THERAPY CONTINUED: ICD-10-CM

## 2021-02-04 DIAGNOSIS — Z51.81 ENCOUNTER FOR MONITORING SUBOXONE MAINTENANCE THERAPY: ICD-10-CM

## 2021-02-04 DIAGNOSIS — Z79.899 ENCOUNTER FOR MONITORING SUBOXONE MAINTENANCE THERAPY: ICD-10-CM

## 2021-02-04 DIAGNOSIS — F11.10 HEROIN ABUSE (HCC): ICD-10-CM

## 2021-02-04 DIAGNOSIS — F19.20 DRUG DEPENDENCE (HCC): Primary | ICD-10-CM

## 2021-02-04 PROCEDURE — 99213 OFFICE O/P EST LOW 20 MIN: CPT | Performed by: INTERNAL MEDICINE

## 2021-02-04 PROCEDURE — 3725F SCREEN DEPRESSION PERFORMED: CPT | Performed by: INTERNAL MEDICINE

## 2021-02-04 RX ORDER — BUPRENORPHINE HYDROCHLORIDE AND NALOXONE HYDROCHLORIDE DIHYDRATE 2; .5 MG/1; MG/1
1 TABLET SUBLINGUAL DAILY
Qty: 30 TABLET | Refills: 0 | Status: SHIPPED | OUTPATIENT
Start: 2021-02-04 | End: 2021-03-04 | Stop reason: SDUPTHER

## 2021-02-04 NOTE — PATIENT INSTRUCTIONS
Buprenorphine/Naloxone (Into the mouth)   Buprenorphine (bue-pre-NOR-feen), Naloxone (nal-OX-one)  Treats narcotic dependence  Brand Name(s): Suboxone, Zubsolv   There may be other brand names for this medicine  When This Medicine Should Not Be Used: This medicine is not right for everyone  Do not use it if you had an allergic reaction to buprenorphine or naloxone  How to Use This Medicine: Thin Sheet, Tablet  · Take your medicine as directed  Your dose may need to be changed several times to find what works best for you  · You must let the medicine dissolve  Never swallow the film or tablet  Your body may not absorb enough of the medicine if you swallow it  · Your health caregiver should show you how to use the medicine  If you do not understand, ask for help  It is important to use the medicine correctly  · Do not talk while the medicine is inside your mouth  · Buccal film: Rinse your mouth with water to moisten it  Place the film against the inside of your cheek  If your doctor told you to use more than 1 film, place the second film inside your other cheek  Do not place more than 2 films inside of 1 cheek at a time  Do not move or touch the film  Do not eat or drink anything until the film is completely dissolved  · Sublingual tablet: Place the tablet under your tongue  If your doctor told you to use more than 1 tablet, place all of the tablets in different places under your tongue at the same time  You can use 2 tablets at a time until you have taken all of the medicine, if that is easier for you  Let the tablets dissolve completely in your mouth  Do not eat or drink anything until the tablets are completely dissolved  · Sublingual film: Drink some water to help moisten your mouth  Place the film under your tongue  If your doctor told you to use more than 1 film, place the second film on the opposite side from the first one  Do not move the film after you placed it under your tongue   If you are supposed to use more than 2 films, use them the same way, but do not start until the first 2 films are completely dissolved  · Do not break, crush, chew, or cut the film or tablet  · This medicine should come with a Medication Guide  Ask your pharmacist for a copy if you do not have one  · Missed dose: Take a dose as soon as you remember  If it is almost time for your next dose, wait until then and take a regular dose  Do not take extra medicine to make up for a missed dose  · Store the medicine in a closed container at room temperature, away from heat, moisture, and direct light  Drop off any unused narcotic medicine at a drug take-back location right away  If you do not have a drug take-back location near you, flush any unused narcotic medicine down the toilet  Check your local drug store and clinics for take-back locations  You can also check the [employer] web site for locations  Here is the link to the [facility] safe disposal of medicines [facility] com ee  Drugs and Foods to Avoid:   Ask your doctor or pharmacist before using any other medicine, including over-the-counter medicines, vitamins, and herbal products  · Do not use this medicine if you are using or have used an MAO inhibitor within the past 14 days  · Some medicines can affect how buprenorphine/naloxone works  Tell your doctor if you are using the following:   ? Carbamazepine, cyclobenzaprine, erythromycin, ketoconazole, metaxalone, mirtazapine, phenobarbital, phenytoin, rifampin, tramadol, trazodone  ? Diuretic (water pill)  ? Medicine to treat depression, anxiety, and mental health illness  ? Medicine to treat HIV/AIDS (including atazanavir, delavirdine, efavirenz, etravirine, nevirapine, ritonavir)  ? Phenothiazine medicine  ?  Triptan medicine to treat migraine headaches  · Do not drink alcohol while you are using this medicine  · Tell your doctor if you use anything else that makes you sleepy  Some examples are allergy medicine, narcotic pain medicine, and alcohol  Tell your doctor if you are also using butorphanol, nalbuphine, pentazocine, or a muscle relaxer  Warnings While Using This Medicine:   · Tell your doctor if you are pregnant or breastfeeding, or if you have kidney disease, liver disease (including hepatitis), lung or breathing problems (including sleep apnea), adrenal gland problems, an enlarged prostate, trouble urinating, gallbladder problems, thyroid problems, stomach problems, or a history of depression, brain tumor, head injury, alcohol or drug abuse  · This medicine may cause the following problems:  ? High risk of overdose, which can lead to death  ? Respiratory depression (serious breathing problem that can be life-threatening)  ? Sleep-related breathing problems (including sleep apnea, sleep-related hypoxemia)  ? Liver problems  ? Serotonin syndrome, when used with certain medicines  · This medicine may make you dizzy or drowsy  Do not drive or do anything else that could be dangerous until you know how this medicine affects you  Stand or sit up slowly if you feel lightheaded or dizzy  · Tell any doctor or dentist who treats you that you are using this medicine  · This medicine can be habit-forming  Do not use more than your prescribed dose  Call your doctor if you think your medicine is not working  · This medicine may cause constipation, especially with long-term use  Ask your doctor if you should use a laxative to prevent and treat constipation  · Do not stop using this medicine suddenly  Your doctor will need to slowly decrease your dose before you stop it completely  · This medicine could cause infertility  Talk with your doctor before using this medicine if you plan to have children  · Your doctor will do lab tests at regular visits to check on the effects of this medicine   Keep all appointments  · Keep all medicine out of the reach of children  Never share your medicine with anyone  Possible Side Effects While Using This Medicine:   Call your doctor right away if you notice any of these side effects:  · Allergic reaction: Itching or hives, swelling in your face or hands, swelling or tingling in your mouth or throat, chest tightness, trouble breathing  · Blue lips, fingernails, or skin  · Changes in skin color, dark freckles  · Cold feeling, weakness or tiredness, weight loss  · Dark urine or pale stools, nausea, vomiting, loss of appetite, stomach pain, yellow skin or eyes  · Extreme dizziness or weakness, shallow breathing, sweating, seizures, cold or clammy skin  · Severe confusion, lightheadedness, dizziness, or fainting  · Trouble breathing or slow breathing  If you notice these less serious side effects, talk with your doctor:   · Constipation or upset stomach  · Headache, trouble sleeping  · Shaking, runny nose, watery eyes, diarrhea, muscle aches  If you notice other side effects that you think are caused by this medicine, tell your doctor  Call your doctor for medical advice about side effects  You may report side effects to FDA at 5-627-FDA-1936  © Copyright [address] Information is for End User's use only and may not be sold, redistributed or otherwise used for commercial purposes  The above information is an  only  It is not intended as medical advice for individual conditions or treatments  Talk to your doctor, nurse or pharmacist before following any medical regimen to see if it is safe and effective for you

## 2021-02-04 NOTE — PROGRESS NOTES
Assessment/Plan:  Problem List Items Addressed This Visit        Other    Drug dependence ([facility] ) - Primary    Drug therapy continued    Encounter for monitoring Suboxone maintenance therapy    Heroin abuse ([facility] )           Diagnoses and all orders for this visit:    Drug dependence ([facility] )    Encounter for monitoring Suboxone maintenance therapy    Drug therapy continued    Heroin abuse ([facility] )        No problem-specific Assessment & Plan notes found for this encounter  Scheduled Medication Review:  Pt's scheduled medication use was reviewed by myself/staff via the [employer] website  Pt's use has been found to be appropriate w/o any concerns for misuse by the patient  Pt's current conditions require continued scheduled medication use at this time  Future review for continued appropriate medication use and misuse will continue  A/P: Doing well and will continue 2mg tabs, dose 6/6 and consider weaning next visits  Continue with  counseling  Reminded to keep meds safe and out of reach of children  RTC 4 weeks  Subjective: WM presents for f/u suboxone  Doing well and no c/o's  Not using and no withdraw  Does attend counseling  UDT not due today  Tolerating the meds and no side effects  Patient ID: Medina Paul is a 30 y o  male  HPI    The following portions of the patient's history were reviewed and updated as appropriate:   He has a past medical history of Bipolar 2 disorder ([facility] ), MAURICE (generalized anxiety disorder) (10/10/2018), Heroin abuse ([facility] ) (10/10/2018), and Manic behavior ([facility] )  ,  does not have any pertinent problems on file  ,   has a past surgical history that includes Mandible fracture surgery and [place] tooth extraction  ,  family history includes Hepatitis in his father; Multiple sclerosis in his mother; Seizures in his mother  ,   reports that he has quit smoking  He has a 3 00 pack-year smoking history  He has quit using smokeless tobacco   His smokeless tobacco use included chew  He reports current drug use  Drugs: Amphetamines, Cocaine, Hydrocodone, Heroin, Marijuana, and Oxycodone  He reports that he does not drink alcohol ,  is allergic to topamax [topiramate]     Current Outpatient Medications   Medication Sig Dispense Refill    buprenorphine-naloxone (SUBOXONE) 2-0 5 mg per SL tablet Place 1 tablet under the tongue daily 30 tablet 0    cloNIDine (CATAPRES) 0 1 mg tablet Take 1 tablet (0 1 mg total) by mouth 3 (three) times a day 90 tablet 5    zolpidem (AMBIEN) 10 mg tablet Take 1 tablet (10 mg total) by mouth daily at bedtime as needed for sleep 30 tablet 0    NARCAN 4 MG/0 1ML LIQD instill 1 spray in 1 NOSTRIL if needed for opioid overdose may re     (REFER TO PRESCRIPTION NOTES)  0     No current facility-administered medications for this visit  Review of Systems   Constitutional: Negative for activity change, chills, diaphoresis, fatigue and fever  Respiratory: Negative for cough, chest tightness, shortness of breath and wheezing  Cardiovascular: Negative for chest pain, palpitations and leg swelling  Gastrointestinal: Negative for abdominal pain, constipation, diarrhea, nausea and vomiting  Genitourinary: Negative for difficulty urinating, dysuria and frequency  Musculoskeletal: Negative for arthralgias, gait problem and myalgias  Neurological: Negative for dizziness, seizures, syncope, weakness, light-headedness and headaches  Psychiatric/Behavioral: Negative for confusion, dysphoric mood, self-injury, sleep disturbance and suicidal ideas  The patient is not nervous/anxious  PHQ-9 Depression Screening    PHQ-9:   Frequency of the following problems over the past two weeks:            Objective:  Vitals:    02/04/21 1548   BP: 118/76   Pulse: 74   Temp: 98 9 °F (37 2 °C)   SpO2: 98%   Weight: 67 4 kg (148 lb 8 oz)   Height: 5' 7" (1 702 m)     Body mass index is 23 26 kg/m²  Physical Exam  Vitals signs and nursing note reviewed  Constitutional:       General: He is not in acute distress  Appearance: Normal appearance  He is not ill-appearing  HENT:      Head: Normocephalic and atraumatic  Mouth/Throat:      Mouth: Mucous membranes are moist    Eyes:      Extraocular Movements: Extraocular movements intact  Conjunctiva/sclera: Conjunctivae normal       Pupils: Pupils are equal, round, and reactive to light  Cardiovascular:      Rate and Rhythm: Normal rate and regular rhythm  Heart sounds: Normal heart sounds  Pulmonary:      Effort: Pulmonary effort is normal  No respiratory distress  Breath sounds: Normal breath sounds  No wheezing or rales  Abdominal:      General: Bowel sounds are normal  There is no distension  Palpations: Abdomen is soft  Tenderness: There is no abdominal tenderness  Neurological:      General: No focal deficit present  Mental Status: He is alert and oriented to person, place, and time  Mental status is at baseline  Psychiatric:         Mood and Affect: Mood normal          Behavior: Behavior normal          Thought Content:  Thought content normal          Judgment: Judgment normal

## 2021-02-12 DIAGNOSIS — G47.00 INSOMNIA, UNSPECIFIED TYPE: ICD-10-CM

## 2021-02-12 RX ORDER — ZOLPIDEM TARTRATE 10 MG/1
10 TABLET ORAL
Qty: 30 TABLET | Refills: 0 | Status: SHIPPED | OUTPATIENT
Start: 2021-02-12 | End: 2021-03-15 | Stop reason: SDUPTHER

## 2021-03-04 ENCOUNTER — OFFICE VISIT (OUTPATIENT)
Dept: INTERNAL MEDICINE CLINIC | Facility: CLINIC | Age: 26
End: 2021-03-04
Payer: COMMERCIAL

## 2021-03-04 VITALS
WEIGHT: 151.38 LBS | DIASTOLIC BLOOD PRESSURE: 80 MMHG | TEMPERATURE: 97.7 F | BODY MASS INDEX: 23.76 KG/M2 | HEART RATE: 91 BPM | OXYGEN SATURATION: 97 % | HEIGHT: 67 IN | SYSTOLIC BLOOD PRESSURE: 124 MMHG

## 2021-03-04 DIAGNOSIS — F19.20 DRUG DEPENDENCE (HCC): Primary | ICD-10-CM

## 2021-03-04 DIAGNOSIS — Z79.899 DRUG THERAPY CONTINUED: ICD-10-CM

## 2021-03-04 DIAGNOSIS — Z51.81 ENCOUNTER FOR MONITORING SUBOXONE MAINTENANCE THERAPY: ICD-10-CM

## 2021-03-04 DIAGNOSIS — Z79.899 ENCOUNTER FOR MONITORING SUBOXONE MAINTENANCE THERAPY: ICD-10-CM

## 2021-03-04 DIAGNOSIS — F11.10 HEROIN ABUSE (HCC): ICD-10-CM

## 2021-03-04 PROCEDURE — 1036F TOBACCO NON-USER: CPT | Performed by: INTERNAL MEDICINE

## 2021-03-04 PROCEDURE — 99213 OFFICE O/P EST LOW 20 MIN: CPT | Performed by: INTERNAL MEDICINE

## 2021-03-04 PROCEDURE — 3008F BODY MASS INDEX DOCD: CPT | Performed by: INTERNAL MEDICINE

## 2021-03-04 PROCEDURE — 80307 DRUG TEST PRSMV CHEM ANLYZR: CPT | Performed by: INTERNAL MEDICINE

## 2021-03-04 RX ORDER — BUPRENORPHINE HYDROCHLORIDE AND NALOXONE HYDROCHLORIDE DIHYDRATE 2; .5 MG/1; MG/1
1 TABLET SUBLINGUAL DAILY
Qty: 30 TABLET | Refills: 0 | Status: SHIPPED | OUTPATIENT
Start: 2021-03-04 | End: 2021-04-02

## 2021-03-04 NOTE — PATIENT INSTRUCTIONS
Buprenorphine/Naloxone (Into the mouth)   Buprenorphine (bue-pre-NOR-feen), Naloxone (nal-OX-one)  Treats narcotic dependence  Brand Name(s): Suboxone, Zubsolv   There may be other brand names for this medicine  When This Medicine Should Not Be Used: This medicine is not right for everyone  Do not use it if you had an allergic reaction to buprenorphine or naloxone  How to Use This Medicine: Thin Sheet, Tablet  · Take your medicine as directed  Your dose may need to be changed several times to find what works best for you  · You must let the medicine dissolve  Never swallow the film or tablet  Your body may not absorb enough of the medicine if you swallow it  · Your health caregiver should show you how to use the medicine  If you do not understand, ask for help  It is important to use the medicine correctly  · Do not talk while the medicine is inside your mouth  · Buccal film: Rinse your mouth with water to moisten it  Place the film against the inside of your cheek  If your doctor told you to use more than 1 film, place the second film inside your other cheek  Do not place more than 2 films inside of 1 cheek at a time  Do not move or touch the film  Do not eat or drink anything until the film is completely dissolved  · Sublingual tablet: Place the tablet under your tongue  If your doctor told you to use more than 1 tablet, place all of the tablets in different places under your tongue at the same time  You can use 2 tablets at a time until you have taken all of the medicine, if that is easier for you  Let the tablets dissolve completely in your mouth  Do not eat or drink anything until the tablets are completely dissolved  · Sublingual film: Drink some water to help moisten your mouth  Place the film under your tongue  If your doctor told you to use more than 1 film, place the second film on the opposite side from the first one  Do not move the film after you placed it under your tongue   If you are supposed to use more than 2 films, use them the same way, but do not start until the first 2 films are completely dissolved  · Do not break, crush, chew, or cut the film or tablet  · This medicine should come with a Medication Guide  Ask your pharmacist for a copy if you do not have one  · Missed dose: Take a dose as soon as you remember  If it is almost time for your next dose, wait until then and take a regular dose  Do not take extra medicine to make up for a missed dose  · Store the medicine in a closed container at room temperature, away from heat, moisture, and direct light  Drop off any unused narcotic medicine at a drug take-back location right away  If you do not have a drug take-back location near you, flush any unused narcotic medicine down the toilet  Check your local drug store and clinics for take-back locations  You can also check the [employer] web site for locations  Here is the link to the [facility] safe disposal of medicines [employer] com ee  Drugs and Foods to Avoid:   Ask your doctor or pharmacist before using any other medicine, including over-the-counter medicines, vitamins, and herbal products  · Do not use this medicine if you are using or have used an MAO inhibitor within the past 14 days  · Some medicines can affect how buprenorphine/naloxone works  Tell your doctor if you are using the following:   ? Carbamazepine, cyclobenzaprine, erythromycin, ketoconazole, metaxalone, mirtazapine, phenobarbital, phenytoin, rifampin, tramadol, trazodone  ? Diuretic (water pill)  ? Medicine to treat depression, anxiety, and mental health illness  ? Medicine to treat HIV/AIDS (including atazanavir, delavirdine, efavirenz, etravirine, nevirapine, ritonavir)  ? Phenothiazine medicine  ?  Triptan medicine to treat migraine headaches  · Do not drink alcohol while you are using this medicine  · Tell your doctor if you use anything else that makes you sleepy  Some examples are allergy medicine, narcotic pain medicine, and alcohol  Tell your doctor if you are also using butorphanol, nalbuphine, pentazocine, or a muscle relaxer  Warnings While Using This Medicine:   · Tell your doctor if you are pregnant or breastfeeding, or if you have kidney disease, liver disease (including hepatitis), lung or breathing problems (including sleep apnea), adrenal gland problems, an enlarged prostate, trouble urinating, gallbladder problems, thyroid problems, stomach problems, or a history of depression, brain tumor, head injury, alcohol or drug abuse  · This medicine may cause the following problems:  ? High risk of overdose, which can lead to death  ? Respiratory depression (serious breathing problem that can be life-threatening)  ? Sleep-related breathing problems (including sleep apnea, sleep-related hypoxemia)  ? Liver problems  ? Serotonin syndrome, when used with certain medicines  · This medicine may make you dizzy or drowsy  Do not drive or do anything else that could be dangerous until you know how this medicine affects you  Stand or sit up slowly if you feel lightheaded or dizzy  · Tell any doctor or dentist who treats you that you are using this medicine  · This medicine can be habit-forming  Do not use more than your prescribed dose  Call your doctor if you think your medicine is not working  · This medicine may cause constipation, especially with long-term use  Ask your doctor if you should use a laxative to prevent and treat constipation  · Do not stop using this medicine suddenly  Your doctor will need to slowly decrease your dose before you stop it completely  · This medicine could cause infertility  Talk with your doctor before using this medicine if you plan to have children  · Your doctor will do lab tests at regular visits to check on the effects of this medicine   Keep all appointments  · Keep all medicine out of the reach of children  Never share your medicine with anyone  Possible Side Effects While Using This Medicine:   Call your doctor right away if you notice any of these side effects:  · Allergic reaction: Itching or hives, swelling in your face or hands, swelling or tingling in your mouth or throat, chest tightness, trouble breathing  · Blue lips, fingernails, or skin  · Changes in skin color, dark freckles  · Cold feeling, weakness or tiredness, weight loss  · Dark urine or pale stools, nausea, vomiting, loss of appetite, stomach pain, yellow skin or eyes  · Extreme dizziness or weakness, shallow breathing, sweating, seizures, cold or clammy skin  · Severe confusion, lightheadedness, dizziness, or fainting  · Trouble breathing or slow breathing  If you notice these less serious side effects, talk with your doctor:   · Constipation or upset stomach  · Headache, trouble sleeping  · Shaking, runny nose, watery eyes, diarrhea, muscle aches  If you notice other side effects that you think are caused by this medicine, tell your doctor  Call your doctor for medical advice about side effects  You may report side effects to FDA at 1-868-FDA-9053  © Copyright [address] Information is for End User's use only and may not be sold, redistributed or otherwise used for commercial purposes  The above information is an  only  It is not intended as medical advice for individual conditions or treatments  Talk to your doctor, nurse or pharmacist before following any medical regimen to see if it is safe and effective for you

## 2021-03-04 NOTE — PROGRESS NOTES
Assessment/Plan:  Problem List Items Addressed This Visit        Other    Drug dependence ([place] [address] ) - Primary    Relevant Medications    buprenorphine-naloxone (SUBOXONE) 2-0 5 mg per SL tablet    Other Relevant Orders    Suboxone    Toxicology screen, urine    Drug therapy continued    Relevant Medications    buprenorphine-naloxone (SUBOXONE) 2-0 5 mg per SL tablet    Encounter for monitoring Suboxone maintenance therapy    Relevant Medications    buprenorphine-naloxone (SUBOXONE) 2-0 5 mg per SL tablet    Other Relevant Orders    Suboxone    Toxicology screen, urine    Heroin abuse ([place] [address] )    Relevant Medications    buprenorphine-naloxone (SUBOXONE) 2-0 5 mg per SL tablet           Diagnoses and all orders for this visit:    Drug dependence ([place] [address] )  -     Suboxone  -     Toxicology screen, urine  -     buprenorphine-naloxone (SUBOXONE) 2-0 5 mg per SL tablet; Place 1 tablet under the tongue daily    Encounter for monitoring Suboxone maintenance therapy  -     Suboxone  -     Toxicology screen, urine  -     buprenorphine-naloxone (SUBOXONE) 2-0 5 mg per SL tablet; Place 1 tablet under the tongue daily    Drug therapy continued  -     buprenorphine-naloxone (SUBOXONE) 2-0 5 mg per SL tablet; Place 1 tablet under the tongue daily    Heroin abuse ([place] [address] )  -     buprenorphine-naloxone (SUBOXONE) 2-0 5 mg per SL tablet; Place 1 tablet under the tongue daily        No problem-specific Assessment & Plan notes found for this encounter  Scheduled Medication Review:  Pt's scheduled medication use was reviewed by myself/staff via the [employer] website  Pt's use has been found to be appropriate w/o any concerns for misuse by the patient  Pt's current conditions require continued scheduled medication use at this time  Future review for continued appropriate medication use and misuse will continue  A/P: Doing well and will continue 2mg tabs, dose 7/6, but told we will try to wean next visit  Continue with counseling   Reminded to keep meds safe and out of reach of children  RTC 4 weeks  Subjective: WM presents for f/u suboxone  Doing well and no c/o's  Not using and no withdraw  Does attend counseling  UDT obtained today  Tolerating the meds and no side effects  Patient ID: Argelia Loza is a 30 y o  male  HPI    The following portions of the patient's history were reviewed and updated as appropriate:   He has a past medical history of Bipolar 2 disorder ([facility] ), MAURICE (generalized anxiety disorder) (10/10/2018), Heroin abuse ([facility] ) (10/10/2018), and Manic behavior ([facility] )  ,  does not have any pertinent problems on file  ,   has a past surgical history that includes Mandible fracture surgery and [place] tooth extraction  ,  family history includes Hepatitis in his father; Multiple sclerosis in his mother; Seizures in his mother  ,   reports that he has quit smoking  He has a 3 00 pack-year smoking history  He has quit using smokeless tobacco   His smokeless tobacco use included chew  He reports current drug use  Drugs: Amphetamines, Cocaine, Hydrocodone, Heroin, Marijuana, and Oxycodone  He reports that he does not drink alcohol ,  is allergic to topamax [topiramate]     Current Outpatient Medications   Medication Sig Dispense Refill    buprenorphine-naloxone (SUBOXONE) 2-0 5 mg per SL tablet Place 1 tablet under the tongue daily 30 tablet 0    cloNIDine (CATAPRES) 0 1 mg tablet Take 1 tablet (0 1 mg total) by mouth 3 (three) times a day 90 tablet 5    zolpidem (AMBIEN) 10 mg tablet Take 1 tablet (10 mg total) by mouth daily at bedtime as needed for sleep 30 tablet 0    NARCAN 4 MG/0 1ML LIQD instill 1 spray in 1 NOSTRIL if needed for opioid overdose may re     (REFER TO PRESCRIPTION NOTES)  0     No current facility-administered medications for this visit  Review of Systems   Constitutional: Negative for activity change, chills, diaphoresis, fatigue and fever     Respiratory: Negative for cough, chest tightness, shortness of breath and wheezing  Cardiovascular: Negative for chest pain, palpitations and leg swelling  Gastrointestinal: Negative for abdominal pain, constipation, diarrhea, nausea and vomiting  Genitourinary: Negative for difficulty urinating, dysuria and frequency  Musculoskeletal: Negative for arthralgias, gait problem and myalgias  Neurological: Negative for dizziness, seizures, syncope, weakness, light-headedness and headaches  Psychiatric/Behavioral: Negative for confusion, dysphoric mood and sleep disturbance  The patient is not nervous/anxious  PHQ-9 Depression Screening    PHQ-9:   Frequency of the following problems over the past two weeks:            Objective:  Vitals:    03/04/21 1527   BP: 124/80   Pulse: 91   Temp: 97 7 °F (36 5 °C)   SpO2: 97%   Weight: 68 7 kg (151 lb 6 oz)   Height: 5' 7" (1 702 m)     Body mass index is 23 71 kg/m²  Physical Exam  Vitals signs and nursing note reviewed  Constitutional:       General: He is not in acute distress  Appearance: Normal appearance  He is not ill-appearing  HENT:      Head: Normocephalic and atraumatic  Mouth/Throat:      Mouth: Mucous membranes are moist    Eyes:      Extraocular Movements: Extraocular movements intact  Conjunctiva/sclera: Conjunctivae normal       Pupils: Pupils are equal, round, and reactive to light  Cardiovascular:      Rate and Rhythm: Normal rate and regular rhythm  Heart sounds: Normal heart sounds  Pulmonary:      Effort: Pulmonary effort is normal  No respiratory distress  Breath sounds: Normal breath sounds  No wheezing or rales  Abdominal:      General: Bowel sounds are normal  There is no distension  Palpations: Abdomen is soft  Tenderness: There is no abdominal tenderness  Neurological:      General: No focal deficit present  Mental Status: He is alert and oriented to person, place, and time  Mental status is at baseline     Psychiatric: Mood and Affect: Mood normal          Behavior: Behavior normal          Thought Content:  Thought content normal          Judgment: Judgment normal

## 2021-03-10 LAB
BUPRENORPHINE UR CFM-MCNC: 28 NG/ML
BUPRENORPHINE UR QL CFM: NORMAL NG/ML
BUPRENORPHINE UR QL CFM: POSITIVE
BUPRENORPHINE+NOR UR QL: POSITIVE
NORBUPRENORPHINE UR CFM-MCNC: 40 NG/ML
NORBUPRENORPHINE UR QL CFM: POSITIVE

## 2021-03-15 DIAGNOSIS — G47.00 INSOMNIA, UNSPECIFIED TYPE: ICD-10-CM

## 2021-03-15 RX ORDER — ZOLPIDEM TARTRATE 10 MG/1
10 TABLET ORAL
Qty: 30 TABLET | Refills: 0 | Status: SHIPPED | OUTPATIENT
Start: 2021-03-15 | End: 2021-04-21 | Stop reason: SDUPTHER

## 2021-03-15 NOTE — TELEPHONE ENCOUNTER
Pt needs zolpidem (AMBIEN) 10 mg tablet refilled, send to [facility] on [facility]

## 2021-04-02 ENCOUNTER — OFFICE VISIT (OUTPATIENT)
Dept: INTERNAL MEDICINE CLINIC | Facility: CLINIC | Age: 26
End: 2021-04-02
Payer: COMMERCIAL

## 2021-04-02 VITALS
TEMPERATURE: 97.9 F | HEART RATE: 65 BPM | DIASTOLIC BLOOD PRESSURE: 80 MMHG | OXYGEN SATURATION: 98 % | WEIGHT: 147 LBS | BODY MASS INDEX: 23.07 KG/M2 | HEIGHT: 67 IN | SYSTOLIC BLOOD PRESSURE: 122 MMHG

## 2021-04-02 DIAGNOSIS — Z79.899 DRUG THERAPY CONTINUED: ICD-10-CM

## 2021-04-02 DIAGNOSIS — Z79.899 ENCOUNTER FOR MONITORING SUBOXONE MAINTENANCE THERAPY: ICD-10-CM

## 2021-04-02 DIAGNOSIS — F19.20 DRUG DEPENDENCE (HCC): Primary | ICD-10-CM

## 2021-04-02 DIAGNOSIS — Z51.81 ENCOUNTER FOR MONITORING SUBOXONE MAINTENANCE THERAPY: ICD-10-CM

## 2021-04-02 PROCEDURE — 1036F TOBACCO NON-USER: CPT | Performed by: INTERNAL MEDICINE

## 2021-04-02 PROCEDURE — 3008F BODY MASS INDEX DOCD: CPT | Performed by: INTERNAL MEDICINE

## 2021-04-02 PROCEDURE — 99213 OFFICE O/P EST LOW 20 MIN: CPT | Performed by: INTERNAL MEDICINE

## 2021-04-02 RX ORDER — BUPRENORPHINE AND NALOXONE 2; .5 MG/1; MG/1
FILM, SOLUBLE BUCCAL; SUBLINGUAL
Qty: 15 FILM | Refills: 0 | Status: SHIPPED | OUTPATIENT
Start: 2021-04-02 | End: 2022-04-18 | Stop reason: ALTCHOICE

## 2021-04-02 NOTE — PROGRESS NOTES
Assessment/Plan:  Problem List Items Addressed This Visit        Other    Encounter for monitoring Suboxone maintenance therapy    Drug therapy continued    Drug dependence ([place] [address] ) - Primary           Diagnoses and all orders for this visit:    Drug dependence ([facility] )    Encounter for monitoring Suboxone maintenance therapy    Drug therapy continued        No problem-specific Assessment & Plan notes found for this encounter  Scheduled Medication Review:  Pt's scheduled medication use was reviewed by myself/staff via the [employer] website  Pt's use has been found to be appropriate w/o any concerns for misuse by the patient  Pt's current conditions require continued scheduled medication use at this time  Future review for continued appropriate medication use and misuse will continue  A/P: Doing well and will wean to 1mg films, dose 1/6 and continue with  counseling  Reminded to keep meds safe and out of reach of children  RTC on3 weeks  Subjective: WM presents for f/u suboxone  Doing well and no c/o's  Not using and no withdraw  Does attend counseling  UDT not due today  Tolerating the meds and no side effects  Patient ID: Enrico Raymundo is a 30 y o  male  HPI    The following portions of the patient's history were reviewed and updated as appropriate:   He has a past medical history of Bipolar 2 disorder ([place] [address] ), MAURICE (generalized anxiety disorder) (10/10/2018), Heroin abuse ([place] [address] ) (10/10/2018), and Manic behavior ([place] [address] )  ,  does not have any pertinent problems on file  ,   has a past surgical history that includes Mandible fracture surgery and [place] tooth extraction  ,  family history includes Hepatitis in his father; Multiple sclerosis in his mother; Seizures in his mother  ,   reports that he has quit smoking  He has a 3 00 pack-year smoking history  He has quit using smokeless tobacco   His smokeless tobacco use included chew  He reports current drug use   Drugs: Amphetamines, Cocaine, Hydrocodone, Heroin, Marijuana, and Oxycodone  He reports that he does not drink alcohol ,  is allergic to topamax [topiramate]     Current Outpatient Medications   Medication Sig Dispense Refill    cloNIDine (CATAPRES) 0 1 mg tablet Take 1 tablet (0 1 mg total) by mouth 3 (three) times a day 90 tablet 5    NARCAN 4 MG/0 1ML LIQD instill 1 spray in 1 NOSTRIL if needed for opioid overdose may re     (REFER TO PRESCRIPTION NOTES)  0    zolpidem (AMBIEN) 10 mg tablet Take 1 tablet (10 mg total) by mouth daily at bedtime as needed for sleep 30 tablet 0     No current facility-administered medications for this visit  Review of Systems   Constitutional: Negative for activity change, chills, diaphoresis, fatigue and fever  Respiratory: Negative for cough, chest tightness, shortness of breath and wheezing  Cardiovascular: Negative for chest pain, palpitations and leg swelling  Gastrointestinal: Negative for abdominal pain, constipation, diarrhea, nausea and vomiting  Genitourinary: Negative for difficulty urinating, dysuria and frequency  Musculoskeletal: Negative for arthralgias, gait problem and myalgias  Neurological: Negative for dizziness, seizures, syncope, weakness, light-headedness and headaches  Psychiatric/Behavioral: Negative for confusion, dysphoric mood, self-injury, sleep disturbance and suicidal ideas  The patient is not nervous/anxious  PHQ-9 Depression Screening    PHQ-9:   Frequency of the following problems over the past two weeks:            Objective:  Vitals:    04/02/21 1351   BP: 122/80   Pulse: 65   Temp: 97 9 °F (36 6 °C)   SpO2: 98%   Weight: 66 7 kg (147 lb)   Height: 5' 7" (1 702 m)     Body mass index is 23 02 kg/m²  Physical Exam  Vitals signs and nursing note reviewed  Constitutional:       General: He is not in acute distress  Appearance: Normal appearance  He is not ill-appearing  HENT:      Head: Normocephalic and atraumatic        Mouth/Throat:      Mouth: Mucous membranes are moist    Eyes:      Extraocular Movements: Extraocular movements intact  Conjunctiva/sclera: Conjunctivae normal       Pupils: Pupils are equal, round, and reactive to light  Cardiovascular:      Rate and Rhythm: Normal rate and regular rhythm  Heart sounds: Normal heart sounds  Pulmonary:      Effort: No respiratory distress  Breath sounds: Normal breath sounds  No wheezing or rales  Abdominal:      General: Bowel sounds are normal  There is no distension  Palpations: Abdomen is soft  Tenderness: There is no abdominal tenderness  Neurological:      General: No focal deficit present  Mental Status: He is alert and oriented to person, place, and time  Mental status is at baseline  Psychiatric:         Mood and Affect: Mood normal          Behavior: Behavior normal          Thought Content:  Thought content normal          Judgment: Judgment normal

## 2021-04-02 NOTE — PATIENT INSTRUCTIONS
Buprenorphine/Naloxone (Into the mouth)   Buprenorphine (bue-pre-NOR-feen), Naloxone (nal-OX-one)  Treats narcotic dependence  Brand Name(s): Suboxone, Zubsolv   There may be other brand names for this medicine  When This Medicine Should Not Be Used: This medicine is not right for everyone  Do not use it if you had an allergic reaction to buprenorphine or naloxone  How to Use This Medicine: Thin Sheet, Tablet  · Take your medicine as directed  Your dose may need to be changed several times to find what works best for you  · You must let the medicine dissolve  Never swallow the film or tablet  Your body may not absorb enough of the medicine if you swallow it  · Your health caregiver should show you how to use the medicine  If you do not understand, ask for help  It is important to use the medicine correctly  · Do not talk while the medicine is inside your mouth  · Buccal film: Rinse your mouth with water to moisten it  Place the film against the inside of your cheek  If your doctor told you to use more than 1 film, place the second film inside your other cheek  Do not place more than 2 films inside of 1 cheek at a time  Do not move or touch the film  Do not eat or drink anything until the film is completely dissolved  · Sublingual tablet: Place the tablet under your tongue  If your doctor told you to use more than 1 tablet, place all of the tablets in different places under your tongue at the same time  You can use 2 tablets at a time until you have taken all of the medicine, if that is easier for you  Let the tablets dissolve completely in your mouth  Do not eat or drink anything until the tablets are completely dissolved  · Sublingual film: Drink some water to help moisten your mouth  Place the film under your tongue  If your doctor told you to use more than 1 film, place the second film on the opposite side from the first one  Do not move the film after you placed it under your tongue   If you are supposed to use more than 2 films, use them the same way, but do not start until the first 2 films are completely dissolved  · Do not break, crush, chew, or cut the film or tablet  · This medicine should come with a Medication Guide  Ask your pharmacist for a copy if you do not have one  · Missed dose: Take a dose as soon as you remember  If it is almost time for your next dose, wait until then and take a regular dose  Do not take extra medicine to make up for a missed dose  · Store the medicine in a closed container at room temperature, away from heat, moisture, and direct light  Drop off any unused narcotic medicine at a drug take-back location right away  If you do not have a drug take-back location near you, flush any unused narcotic medicine down the toilet  Check your local drug store and clinics for take-back locations  You can also check the [employer] web site for locations  Here is the link to the [facility] safe disposal of medicines [employer] com ee  Drugs and Foods to Avoid:   Ask your doctor or pharmacist before using any other medicine, including over-the-counter medicines, vitamins, and herbal products  · Do not use this medicine if you are using or have used an MAO inhibitor within the past 14 days  · Some medicines can affect how buprenorphine/naloxone works  Tell your doctor if you are using the following:   ? Carbamazepine, cyclobenzaprine, erythromycin, ketoconazole, metaxalone, mirtazapine, phenobarbital, phenytoin, rifampin, tramadol, trazodone  ? Diuretic (water pill)  ? Medicine to treat depression, anxiety, and mental health illness  ? Medicine to treat HIV/AIDS (including atazanavir, delavirdine, efavirenz, etravirine, nevirapine, ritonavir)  ? Phenothiazine medicine  ?  Triptan medicine to treat migraine headaches  · Do not drink alcohol while you are using this medicine  · Tell your doctor if you use anything else that makes you sleepy  Some examples are allergy medicine, narcotic pain medicine, and alcohol  Tell your doctor if you are also using butorphanol, nalbuphine, pentazocine, or a muscle relaxer  Warnings While Using This Medicine:   · Tell your doctor if you are pregnant or breastfeeding, or if you have kidney disease, liver disease (including hepatitis), lung or breathing problems (including sleep apnea), adrenal gland problems, an enlarged prostate, trouble urinating, gallbladder problems, thyroid problems, stomach problems, or a history of depression, brain tumor, head injury, alcohol or drug abuse  · This medicine may cause the following problems:  ? High risk of overdose, which can lead to death  ? Respiratory depression (serious breathing problem that can be life-threatening)  ? Sleep-related breathing problems (including sleep apnea, sleep-related hypoxemia)  ? Liver problems  ? Serotonin syndrome, when used with certain medicines  · This medicine may make you dizzy or drowsy  Do not drive or do anything else that could be dangerous until you know how this medicine affects you  Stand or sit up slowly if you feel lightheaded or dizzy  · Tell any doctor or dentist who treats you that you are using this medicine  · This medicine can be habit-forming  Do not use more than your prescribed dose  Call your doctor if you think your medicine is not working  · This medicine may cause constipation, especially with long-term use  Ask your doctor if you should use a laxative to prevent and treat constipation  · Do not stop using this medicine suddenly  Your doctor will need to slowly decrease your dose before you stop it completely  · This medicine could cause infertility  Talk with your doctor before using this medicine if you plan to have children  · Your doctor will do lab tests at regular visits to check on the effects of this medicine   Keep all appointments  · Keep all medicine out of the reach of children  Never share your medicine with anyone  Possible Side Effects While Using This Medicine:   Call your doctor right away if you notice any of these side effects:  · Allergic reaction: Itching or hives, swelling in your face or hands, swelling or tingling in your mouth or throat, chest tightness, trouble breathing  · Blue lips, fingernails, or skin  · Changes in skin color, dark freckles  · Cold feeling, weakness or tiredness, weight loss  · Dark urine or pale stools, nausea, vomiting, loss of appetite, stomach pain, yellow skin or eyes  · Extreme dizziness or weakness, shallow breathing, sweating, seizures, cold or clammy skin  · Severe confusion, lightheadedness, dizziness, or fainting  · Trouble breathing or slow breathing  If you notice these less serious side effects, talk with your doctor:   · Constipation or upset stomach  · Headache, trouble sleeping  · Shaking, runny nose, watery eyes, diarrhea, muscle aches  If you notice other side effects that you think are caused by this medicine, tell your doctor  Call your doctor for medical advice about side effects  You may report side effects to FDA at 7-762-FDA-0831  © Copyright [address] Information is for End User's use only and may not be sold, redistributed or otherwise used for commercial purposes  The above information is an  only  It is not intended as medical advice for individual conditions or treatments  Talk to your doctor, nurse or pharmacist before following any medical regimen to see if it is safe and effective for you

## 2021-04-21 DIAGNOSIS — I10 ESSENTIAL HYPERTENSION: ICD-10-CM

## 2021-04-21 DIAGNOSIS — G47.00 INSOMNIA, UNSPECIFIED TYPE: ICD-10-CM

## 2021-04-21 RX ORDER — CLONIDINE HYDROCHLORIDE 0.1 MG/1
0.1 TABLET ORAL 3 TIMES DAILY
Qty: 90 TABLET | Refills: 5 | Status: SHIPPED | OUTPATIENT
Start: 2021-04-21

## 2021-04-21 RX ORDER — ZOLPIDEM TARTRATE 10 MG/1
10 TABLET ORAL
Qty: 30 TABLET | Refills: 0 | Status: SHIPPED | OUTPATIENT
Start: 2021-04-21 | End: 2022-04-18 | Stop reason: ALTCHOICE

## 2021-12-16 ENCOUNTER — OFFICE VISIT (OUTPATIENT)
Dept: URGENT CARE | Facility: CLINIC | Age: 26
End: 2021-12-16
Payer: COMMERCIAL

## 2021-12-16 VITALS
HEIGHT: 67 IN | OXYGEN SATURATION: 100 % | WEIGHT: 145 LBS | TEMPERATURE: 97.9 F | HEART RATE: 112 BPM | RESPIRATION RATE: 18 BRPM | BODY MASS INDEX: 22.76 KG/M2

## 2021-12-16 DIAGNOSIS — Z11.59 SPECIAL SCREENING EXAMINATION FOR VIRAL DISEASE: Primary | ICD-10-CM

## 2021-12-16 PROCEDURE — 87636 SARSCOV2 & INF A&B AMP PRB: CPT | Performed by: NURSE PRACTITIONER

## 2021-12-16 PROCEDURE — G0382 LEV 3 HOSP TYPE B ED VISIT: HCPCS | Performed by: NURSE PRACTITIONER

## 2021-12-18 LAB
FLUAV RNA RESP QL NAA+PROBE: NEGATIVE
FLUBV RNA RESP QL NAA+PROBE: NEGATIVE
SARS-COV-2 RNA RESP QL NAA+PROBE: NEGATIVE

## 2022-01-02 ENCOUNTER — NURSE TRIAGE (OUTPATIENT)
Dept: OTHER | Facility: OTHER | Age: 27
End: 2022-01-02

## 2022-01-02 DIAGNOSIS — Z20.828 SARS-ASSOCIATED CORONAVIRUS EXPOSURE: Primary | ICD-10-CM

## 2022-01-02 NOTE — TELEPHONE ENCOUNTER
Regarding: Exposure to covid and now having symptoms chills and sore throat   ----- Message from Elliott Casas sent at 1/1/2022 11:15 PM EST -----  '' I was expose to covid and now having symptoms sore throat, body ache, chills and headache ''

## 2022-01-02 NOTE — TELEPHONE ENCOUNTER
Reason for Disposition   [1] COVID-19 infection suspected by caller or triager AND [2] mild symptoms (cough, fever, or others) AND [3] has not gotten tested yet    Answer Assessment - Initial Assessment Questions  1  Were you within 6 feet or less, for up to 15 minutes or more with a person that has a confirmed COVID-19 test?   Yes     2  What was the date of your exposure? 1 week ago     3  Are you experiencing any symptoms attributed to the virus?  (Assess for SOB, cough, fever, difficulty breathing)   Body aches, fever, headache, sore throat  4  HIGH RISK: Do you have any history heart or lung conditions, weakened immune system, diabetes, Asthma, CHF, HIV, COPD, Chemo, renal failure, sickle cell, etc?   No     5   VACCINE: "Have you gotten the COVID-19 vaccine?" If Yes ask: "Which one, how many shots, when did you get it?"   Unvaccinated    Protocols used: CORONAVIRUS (COVID-19) DIAGNOSED OR SUSPECTED-ADULT-AH

## 2022-01-03 PROCEDURE — 87636 SARSCOV2 & INF A&B AMP PRB: CPT | Performed by: INTERNAL MEDICINE

## 2022-01-14 ENCOUNTER — TELEMEDICINE (OUTPATIENT)
Dept: INTERNAL MEDICINE CLINIC | Facility: CLINIC | Age: 27
End: 2022-01-14
Payer: COMMERCIAL

## 2022-01-14 DIAGNOSIS — Z72.0 TOBACCO ABUSE: ICD-10-CM

## 2022-01-14 DIAGNOSIS — Z71.89 EDUCATED ABOUT COVID-19 VIRUS INFECTION: ICD-10-CM

## 2022-01-14 DIAGNOSIS — U07.1 LAB TEST POSITIVE FOR DETECTION OF COVID-19 VIRUS: Primary | ICD-10-CM

## 2022-01-14 PROCEDURE — 3725F SCREEN DEPRESSION PERFORMED: CPT | Performed by: INTERNAL MEDICINE

## 2022-01-14 PROCEDURE — 99213 OFFICE O/P EST LOW 20 MIN: CPT | Performed by: INTERNAL MEDICINE

## 2022-01-14 RX ORDER — AZITHROMYCIN 250 MG/1
TABLET, FILM COATED ORAL
Qty: 6 TABLET | Refills: 0 | Status: SHIPPED | OUTPATIENT
Start: 2022-01-14 | End: 2022-01-19

## 2022-01-14 RX ORDER — BENZONATATE 200 MG/1
200 CAPSULE ORAL 3 TIMES DAILY PRN
Qty: 20 CAPSULE | Refills: 0 | Status: SHIPPED | OUTPATIENT
Start: 2022-01-14 | End: 2022-04-18 | Stop reason: ALTCHOICE

## 2022-01-14 RX ORDER — GUAIFENESIN 600 MG
600 TABLET, EXTENDED RELEASE 12 HR ORAL EVERY 12 HOURS SCHEDULED
Qty: 20 TABLET | Refills: 0 | Status: SHIPPED | OUTPATIENT
Start: 2022-01-14 | End: 2022-04-18

## 2022-01-14 RX ORDER — FLUTICASONE PROPIONATE 50 MCG
1 SPRAY, SUSPENSION (ML) NASAL DAILY
Qty: 16 G | Refills: 0 | Status: SHIPPED | OUTPATIENT
Start: 2022-01-14 | End: 2022-04-18 | Stop reason: ALTCHOICE

## 2022-01-14 NOTE — PROGRESS NOTES
COVID-19 Outpatient Progress Note    Assessment/Plan:    Problem List Items Addressed This Visit        Other    Tobacco abuse    Relevant Medications    azithromycin (ZITHROMAX) 250 mg tablet    fluticasone (FLONASE) 50 mcg/act nasal spray    guaiFENesin (Mucinex) 600 mg 12 hr tablet    benzonatate (TESSALON) 200 MG capsule      Other Visit Diagnoses     Lab test positive for detection of COVID-19 virus    -  Primary    Relevant Medications    azithromycin (ZITHROMAX) 250 mg tablet    fluticasone (FLONASE) 50 mcg/act nasal spray    guaiFENesin (Mucinex) 600 mg 12 hr tablet    benzonatate (TESSALON) 200 MG capsule    Educated about COVID-19 virus infection        Relevant Medications    azithromycin (ZITHROMAX) 250 mg tablet    fluticasone (FLONASE) 50 mcg/act nasal spray    guaiFENesin (Mucinex) 600 mg 12 hr tablet    benzonatate (TESSALON) 200 MG capsule         Disposition:     I recommended continued isolation until at least 24 hours have passed since recovery defined as resolution of fever without the use of fever-reducing medications AND improvement in COVID symptoms AND 10 days have passed since onset of symptoms (or 10 days have passed since date of first positive viral diagnostic test for asymptomatic patients)  A/P: Day # 10 since onset of COVID s/s  Covid test was positive  Doing about the same with fatigue, nasal congestion, nausea, and cough and mild SOB  No conversational dyspnea  Stop smoking  Will send in an abx, mucinex, and INS since he continues with s/s so long    Continue isolation/quarantine, increase fluids, PRN motrin/tylenol, and breathing exercises  Unable to lift restrictions due to ongoing s/s not improving  RTC three days for f/u  I have spent 20 minutes directly with the patient   Greater than 50% of this time was spent in counseling/coordination of care regarding: diagnostic results, prognosis, risks and benefits of treatment options, instructions for management, patient and family education, importance of treatment compliance, risk factor reductions and impressions  Encounter provider Nicolas Combs DO    Provider located at [address] [place] [id number]    Recent Visits  No visits were found meeting these conditions  Showing recent visits within past 7 days and meeting all other requirements  Today's Visits  Date Type Provider Dept   01/14/22 Telemedicine Nicolas Combs DO [facility] today's visits and meeting all other requirements  Future Appointments  No visits were found meeting these conditions  Showing future appointments within next 150 days and meeting all other requirements     This virtual check-in was done via [employer] and patient was informed that this is a secure, HIPAA-compliant platform  He agrees to proceed  Patient agrees to participate in a virtual check in via telephone or video visit instead of presenting to the office to address urgent/immediate medical needs  Patient is aware this is a billable service  After connecting through [facility], the patient was identified by name and date of birth  Helen Cooks was informed that this was a telemedicine visit and that the exam was being conducted confidentially over secure lines  My office door was closed  No one else was in the room  Helen Cooks acknowledged consent and understanding of privacy and security of the telemedicine visit  I informed the patient that I have reviewed his record in Epic and presented the opportunity for him to ask any questions regarding the visit today  The patient agreed to participate  Verification of patient location:  Patient is located in the following state in which I hold an active license: [place]    Subjective:   Helen Cooks is a 27 y o  male who has been screened for COVID-19  Symptom change since last report: unchanged   Patient's symptoms include nasal congestion, rhinorrhea, sore throat, anosmia, loss of taste, cough, shortness of breath and nausea  Patient denies fever, chills, fatigue, chest tightness, abdominal pain, vomiting, diarrhea, myalgias and headaches  - Date of symptom onset: 1/4/2022  - Date of positive COVID-19 test: 1/3/2022  Type of test: PCR  COVID-19 vaccination status: Not vaccinated    Sandrita Nelson has been staying home and has isolated themselves in his home  He is taking care to not share personal items and is cleaning all surfaces that are touched often, like counters, tabletops, and doorknobs using household cleaning sprays or wipes  He is wearing a mask when he leaves his room  Lab Results   Component Value Date    SARSCOV2 Positive (A) 01/03/2022    [facility] [address] Not Detected 12/14/2021     Past Medical History:   Diagnosis Date    Bipolar 2 disorder ([place] [address] )     MAURICE (generalized anxiety disorder) 10/10/2018    Heroin abuse ([place] [address] ) 10/10/2018    Manic behavior ([place] [address] )      Past Surgical History:   Procedure Laterality Date    MANDIBLE FRACTURE SURGERY      WISDOM TOOTH EXTRACTION       Current Outpatient Medications   Medication Sig Dispense Refill    azithromycin (ZITHROMAX) 250 mg tablet Take 2 tablets today then 1 tablet daily x 4 days 6 tablet 0    benzonatate (TESSALON) 200 MG capsule Take 1 capsule (200 mg total) by mouth 3 (three) times a day as needed for cough 20 capsule 0    buprenorphine-naloxone (Suboxone) 2-0 5 mg Half a strip sl q day  (Patient not taking: Reported on 12/16/2021 ) 15 Film 0    cloNIDine (CATAPRES) 0 1 mg tablet Take 1 tablet (0 1 mg total) by mouth 3 (three) times a day 90 tablet 5    fluticasone (FLONASE) 50 mcg/act nasal spray 1 spray into each nostril daily 16 g 0    guaiFENesin (Mucinex) 600 mg 12 hr tablet Take 1 tablet (600 mg total) by mouth every 12 (twelve) hours 20 tablet 0    NARCAN 4 MG/0 1ML LIQD instill 1 spray in 1 NOSTRIL if needed for opioid overdose may re     (REFER TO PRESCRIPTION NOTES)  0    zolpidem (AMBIEN) 10 mg tablet Take 1 tablet (10 mg total) by mouth daily at bedtime as needed for sleep (Patient not taking: Reported on 12/16/2021 ) 30 tablet 0     No current facility-administered medications for this visit  Allergies   Allergen Reactions    Topamax [Topiramate] Angioedema       Review of Systems   Constitutional: Positive for activity change  Negative for chills, diaphoresis, fatigue and fever  HENT: Positive for congestion, postnasal drip, rhinorrhea and sore throat  Negative for ear discharge, ear pain, facial swelling, sinus pressure and sinus pain  loss of taste/smell  Respiratory: Positive for cough and shortness of breath  Negative for chest tightness and wheezing  Cardiovascular: Negative for chest pain, palpitations and leg swelling  Gastrointestinal: Positive for nausea  Negative for abdominal pain, constipation, diarrhea and vomiting  Genitourinary: Negative for difficulty urinating, dysuria and frequency  Musculoskeletal: Negative for arthralgias, gait problem and myalgias  Neurological: Negative for light-headedness and headaches  Psychiatric/Behavioral: Negative for confusion  The patient is not nervous/anxious  Objective:    Vitals:       Physical Exam  Vitals and nursing note reviewed  Constitutional:       General: He is not in acute distress  Appearance: Normal appearance  He is not ill-appearing  HENT:      Head: Normocephalic and atraumatic  Mouth/Throat:      Mouth: Mucous membranes are moist    Eyes:      Extraocular Movements: Extraocular movements intact  Conjunctiva/sclera: Conjunctivae normal       Pupils: Pupils are equal, round, and reactive to light  Pulmonary:      Effort: Pulmonary effort is normal  No respiratory distress  Neurological:      General: No focal deficit present  Mental Status: He is alert and oriented to person, place, and time  Mental status is at baseline  Psychiatric:         Mood and Affect: Mood normal          Behavior: Behavior normal          Thought Content: Thought content normal          Judgment: Judgment normal          VIRTUAL VISIT DISCLAIMER    Miguel Hu verbally agrees to participate in [employer]  Pt is aware that [employer] could be limited without vital signs or the ability to perform a full hands-on physical Lemmie Claadolfo understands he or the provider may request at any time to terminate the video visit and request the patient to seek care or treatment in person

## 2022-01-14 NOTE — PATIENT INSTRUCTIONS
How to Recover from COVID-19 at [facility]:   COVID-19 can cause a range of symptoms, from mild to severe  If you do not need to be treated in a hospital, you will be given instructions to use at home  You will need to watch for worsening symptoms and seek immediate care if needed  You will also need to stay physically apart from others so you do not spread the virus to anyone  Information about COVID-19 is still being learned  It is not known if a person can be infected with the virus again after recovering from COVID-19  It is also not known if or for how long the virus can continue to be passed to others  DISCHARGE INSTRUCTIONS:   If you think someone in your home may be infected,  do the following to protect others:  · If emergency care is needed,  tell the  about the possible infection, or call ahead and tell the emergency department  · Call a healthcare provider  for instructions if symptoms are mild  Anyone who may be infected should not  arrive without calling first  The provider will need to protect staff members and other patients  · The person who may be infected needs to wear a face covering  while getting medical care  This will help lower the risk of infecting others  Coverings are not used for anyone who is younger than 2 years, has breathing problems, or cannot remove it  The provider can give you instructions for anyone who cannot wear a covering  Call your local emergency number (911 in the [address] [place] [address],[address]) or an emergency department if:   · You have trouble breathing or shortness of breath at rest     · You have chest pain or pressure that lasts longer than 5 minutes  · You become confused or hard to wake  · Your lips or face are blue  · You have a fever of 104°F (40°C) or higher  Call your doctor if:   · You have new, returning, or worsening symptoms      · Someone in your home does not  have symptoms of COVID-19 but had close physical contact within 14 days with you     · You have questions or concerns about your condition or care  Medicines:   · NSAIDs , such as ibuprofen, help decrease swelling, pain, and fever  NSAIDs can cause stomach bleeding or kidney problems in certain people  If you take blood thinner medicine, always ask your healthcare provider if NSAIDs are safe for you  Always read the medicine label and follow directions  · Acetaminophen  decreases pain and fever  It is available without a doctor's order  Ask how much to take and how often to take it  Follow directions  Read the labels of all other medicines you are using to see if they also contain acetaminophen, or ask your doctor or pharmacist  Acetaminophen can cause liver damage if not taken correctly  Do not use more than 4 grams (4,000 milligrams) total of acetaminophen in one day  · Take your medicine as directed  Contact your healthcare provider if you think your medicine is not helping or if you have side effects  Tell him or her if you are allergic to any medicine  Keep a list of the medicines, vitamins, and herbs you take  Include the amounts, and when and why you take them  Bring the list or the pill bottles to follow-up visits  Carry your medicine list with you in case of an emergency  Self-care:  Mild symptoms may get better on their own  The following may be used to manage your symptoms:  · Decongestants  help reduce nasal congestion and help you breathe more easily  If you take decongestant pills, they may make you feel restless or cause problems with your sleep  Do not use decongestant sprays for more than a few days  · Cough suppressants  help reduce coughing  Ask your healthcare provider which type of cough medicine is best for you  · To soothe a sore throat,  gargle with warm salt water, or use throat lozenges or a throat spray  Your healthcare provider may recommend a cough medicine  Drink more liquids to thin and loosen mucus and to prevent dehydration   Use decongestants or saline drops as directed for nasal congestion  Keep others safe while you are recovering at home:  Healthcare providers will give you specific instructions to follow  The following are general guidelines to remind you how to keep others safe until you are well:     · Wash your hands often  Use soap and water as much as possible  You can use hand  that contains alcohol if soap and water are not available  Do not share towels with anyone  If you use paper towels, throw them away in a lined trash can kept in your room or area  Use a covered trash can, if possible  · Cover sneezes and coughs  Turn your face away and cover your mouth and nose with a tissue  Throw the tissue away  Use the bend of your arm if a tissue is not available  Then wash your hands well with soap and water or use hand   · Wear a face covering (mask) around anyone who does not live in your home  A covering will help prevent you from passing the virus to others  It is not known for sure if or for how long the virus can be passed after recovery  Do not  wear a plastic face shield instead of a covering  Use a cloth covering with at least 2 layers  You can also create layers by putting a cloth covering over a disposable non-medical mask  Cover your mouth and your nose  The covering should fit snugly against the bridge of your nose  Securely fasten it under your chin and on the sides of your face  A face covering is not a substitute for other safety measures  Continue social distancing and washing your hands often  · Do not go out of your home unless it is necessary  If possible, ask someone who is not infected to go out for groceries, medicines, and household items  Ask your healthcare provider for other ways to have appointments  Some providers offer phone, video, or other types of appointments   If you need to be seen in person, call ahead to make sure the office will be ready for you     · Do not let anyone into your home, room, or area unless it is necessary  If possible, stay in a separate area or room of your home if you live with others  No one should go into the area or room except to give you care  Only allow medical professionals or other necessary helpers in  Wear a face covering  Remind them to wear face coverings and to wash their hands  If possible, ask someone who is well to care for your baby  You can put breast milk in bottles for the person to use, if needed  Wear a clean face covering if you need to breastfeed or express or pump breast milk  Family members and friends should not visit you  You can visit with others by phone, video chat, e-mail, or similar systems  It is important to stay connected with others in your life while you recover  · Talk to your healthcare provider about your baby  Tell him or her if you have any questions or concerns about caring for or bonding with your baby  He or she will tell you when to bring your baby in for check-ups and vaccines  He or she will also tell you what to do if you think your baby was infected with the coronavirus  · Do not handle live animals unless it is necessary  Until more is known, it is best not to touch, play with, or handle live animals  Some animals, including pets, have been infected with the new coronavirus  Do not handle or care for animals until you are well  Care includes feeding, petting, and cuddling your pet  Do not let your pet lick you or share your food  Ask someone who is not infected to take care of your pet, if possible  If you must care for a pet, wear a face covering  Wash your hands before and after you give care  Talk to your healthcare provider about how to keep a service animal safe, if needed  · Follow directions from your healthcare provider for being around others after you recover  It is not known for sure if or for how long a recovered person can pass the virus to others   Your provider may give you instructions, such as continuing social distancing or wearing a face covering around others  The following are general guidelines for when you can be around others:    ? If you never developed any symptoms,  wait at least 10 days after your positive test  Your provider may want you to have 2 negative tests in a row at least 24 hours apart  This depends on how available testing is in your area  ? If you did have symptoms,  wait at least 10 days after the symptoms first appeared  Then you will need to have no fever for 24 hours without fever medicine  Most of your symptoms will also need to be gone  A loss of taste or smell may continue for several months  It is considered okay to be around others if this is your only symptom  ? If you were hospitalized for COVID-19 and needed oxygen,  your provider will tell you how long to wait  You may need to wait until 20 days after symptoms appeared  It may be less if you have 2 negative tests in a row at least 24 hours apart  This will depend on how available testing is in your area  Follow up with your doctor as directed:  Write down your questions so you remember to ask them during your visits  For more information:   · Centers for Disease Control and Prevention  [address] , [address]  Phone: 4- 147 - 934-1521  Web Address: [employer]    © [facility] 2021 Information is for End User's use only and may not be sold, redistributed or otherwise used for commercial purposes  All illustrations and images included in CareNotes® are the copyrighted property of A D A M , Inc  or [address] [place] [address] [employer]tty   The above information is an  only  It is not intended as medical advice for individual conditions or treatments  Talk to your doctor, nurse or pharmacist before following any medical regimen to see if it is safe and effective for you

## 2022-01-17 ENCOUNTER — TELEMEDICINE (OUTPATIENT)
Dept: INTERNAL MEDICINE CLINIC | Facility: CLINIC | Age: 27
End: 2022-01-17
Payer: COMMERCIAL

## 2022-01-17 DIAGNOSIS — Z71.89 EDUCATED ABOUT COVID-19 VIRUS INFECTION: ICD-10-CM

## 2022-01-17 DIAGNOSIS — U07.1 LAB TEST POSITIVE FOR DETECTION OF COVID-19 VIRUS: Primary | ICD-10-CM

## 2022-01-17 PROBLEM — Z87.891 FORMER SMOKER: Status: ACTIVE | Noted: 2018-10-10

## 2022-01-17 PROCEDURE — 1036F TOBACCO NON-USER: CPT | Performed by: INTERNAL MEDICINE

## 2022-01-17 PROCEDURE — 99213 OFFICE O/P EST LOW 20 MIN: CPT | Performed by: INTERNAL MEDICINE

## 2022-01-17 NOTE — PATIENT INSTRUCTIONS
How to Recover from COVID-19 at [facility]:   COVID-19 can cause a range of symptoms, from mild to severe  If you do not need to be treated in a hospital, you will be given instructions to use at home  You will need to watch for worsening symptoms and seek immediate care if needed  You will also need to stay physically apart from others so you do not spread the virus to anyone  Information about COVID-19 is still being learned  It is not known if a person can be infected with the virus again after recovering from COVID-19  It is also not known if or for how long the virus can continue to be passed to others  DISCHARGE INSTRUCTIONS:   If you think someone in your home may be infected,  do the following to protect others:  · If emergency care is needed,  tell the  about the possible infection, or call ahead and tell the emergency department  · Call a healthcare provider  for instructions if symptoms are mild  Anyone who may be infected should not  arrive without calling first  The provider will need to protect staff members and other patients  · The person who may be infected needs to wear a face covering  while getting medical care  This will help lower the risk of infecting others  Coverings are not used for anyone who is younger than 2 years, has breathing problems, or cannot remove it  The provider can give you instructions for anyone who cannot wear a covering  Call your local emergency number (911 in the [address] [place] [address],[address]) or an emergency department if:   · You have trouble breathing or shortness of breath at rest     · You have chest pain or pressure that lasts longer than 5 minutes  · You become confused or hard to wake  · Your lips or face are blue  · You have a fever of 104°F (40°C) or higher  Call your doctor if:   · You have new, returning, or worsening symptoms      · Someone in your home does not  have symptoms of COVID-19 but had close physical contact within 14 days with you     · You have questions or concerns about your condition or care  Medicines:   · NSAIDs , such as ibuprofen, help decrease swelling, pain, and fever  NSAIDs can cause stomach bleeding or kidney problems in certain people  If you take blood thinner medicine, always ask your healthcare provider if NSAIDs are safe for you  Always read the medicine label and follow directions  · Acetaminophen  decreases pain and fever  It is available without a doctor's order  Ask how much to take and how often to take it  Follow directions  Read the labels of all other medicines you are using to see if they also contain acetaminophen, or ask your doctor or pharmacist  Acetaminophen can cause liver damage if not taken correctly  Do not use more than 4 grams (4,000 milligrams) total of acetaminophen in one day  · Take your medicine as directed  Contact your healthcare provider if you think your medicine is not helping or if you have side effects  Tell him or her if you are allergic to any medicine  Keep a list of the medicines, vitamins, and herbs you take  Include the amounts, and when and why you take them  Bring the list or the pill bottles to follow-up visits  Carry your medicine list with you in case of an emergency  Self-care:  Mild symptoms may get better on their own  The following may be used to manage your symptoms:  · Decongestants  help reduce nasal congestion and help you breathe more easily  If you take decongestant pills, they may make you feel restless or cause problems with your sleep  Do not use decongestant sprays for more than a few days  · Cough suppressants  help reduce coughing  Ask your healthcare provider which type of cough medicine is best for you  · To soothe a sore throat,  gargle with warm salt water, or use throat lozenges or a throat spray  Your healthcare provider may recommend a cough medicine  Drink more liquids to thin and loosen mucus and to prevent dehydration   Use decongestants or saline drops as directed for nasal congestion  Keep others safe while you are recovering at home:  Healthcare providers will give you specific instructions to follow  The following are general guidelines to remind you how to keep others safe until you are well:     · Wash your hands often  Use soap and water as much as possible  You can use hand  that contains alcohol if soap and water are not available  Do not share towels with anyone  If you use paper towels, throw them away in a lined trash can kept in your room or area  Use a covered trash can, if possible  · Cover sneezes and coughs  Turn your face away and cover your mouth and nose with a tissue  Throw the tissue away  Use the bend of your arm if a tissue is not available  Then wash your hands well with soap and water or use hand   · Wear a face covering (mask) around anyone who does not live in your home  A covering will help prevent you from passing the virus to others  It is not known for sure if or for how long the virus can be passed after recovery  Do not  wear a plastic face shield instead of a covering  Use a cloth covering with at least 2 layers  You can also create layers by putting a cloth covering over a disposable non-medical mask  Cover your mouth and your nose  The covering should fit snugly against the bridge of your nose  Securely fasten it under your chin and on the sides of your face  A face covering is not a substitute for other safety measures  Continue social distancing and washing your hands often  · Do not go out of your home unless it is necessary  If possible, ask someone who is not infected to go out for groceries, medicines, and household items  Ask your healthcare provider for other ways to have appointments  Some providers offer phone, video, or other types of appointments   If you need to be seen in person, call ahead to make sure the office will be ready for you     · Do not let anyone into your home, room, or area unless it is necessary  If possible, stay in a separate area or room of your home if you live with others  No one should go into the area or room except to give you care  Only allow medical professionals or other necessary helpers in  Wear a face covering  Remind them to wear face coverings and to wash their hands  If possible, ask someone who is well to care for your baby  You can put breast milk in bottles for the person to use, if needed  Wear a clean face covering if you need to breastfeed or express or pump breast milk  Family members and friends should not visit you  You can visit with others by phone, video chat, e-mail, or similar systems  It is important to stay connected with others in your life while you recover  · Talk to your healthcare provider about your baby  Tell him or her if you have any questions or concerns about caring for or bonding with your baby  He or she will tell you when to bring your baby in for check-ups and vaccines  He or she will also tell you what to do if you think your baby was infected with the coronavirus  · Do not handle live animals unless it is necessary  Until more is known, it is best not to touch, play with, or handle live animals  Some animals, including pets, have been infected with the new coronavirus  Do not handle or care for animals until you are well  Care includes feeding, petting, and cuddling your pet  Do not let your pet lick you or share your food  Ask someone who is not infected to take care of your pet, if possible  If you must care for a pet, wear a face covering  Wash your hands before and after you give care  Talk to your healthcare provider about how to keep a service animal safe, if needed  · Follow directions from your healthcare provider for being around others after you recover  It is not known for sure if or for how long a recovered person can pass the virus to others   Your provider may give you instructions, such as continuing social distancing or wearing a face covering around others  The following are general guidelines for when you can be around others:    ? If you never developed any symptoms,  wait at least 10 days after your positive test  Your provider may want you to have 2 negative tests in a row at least 24 hours apart  This depends on how available testing is in your area  ? If you did have symptoms,  wait at least 10 days after the symptoms first appeared  Then you will need to have no fever for 24 hours without fever medicine  Most of your symptoms will also need to be gone  A loss of taste or smell may continue for several months  It is considered okay to be around others if this is your only symptom  ? If you were hospitalized for COVID-19 and needed oxygen,  your provider will tell you how long to wait  You may need to wait until 20 days after symptoms appeared  It may be less if you have 2 negative tests in a row at least 24 hours apart  This will depend on how available testing is in your area  Follow up with your doctor as directed:  Write down your questions so you remember to ask them during your visits  For more information:   · Centers for Disease Control and Prevention  [address] , [address]  Phone: 9- [phone] - [phone]  Web Address: [employer]    © [facility] 2021 Information is for End User's use only and may not be sold, redistributed or otherwise used for commercial purposes  All illustrations and images included in CareNotes® are the copyrighted property of A D A M , Inc  or [address] [place], [place] [address]rigo   The above information is an  only  It is not intended as medical advice for individual conditions or treatments  Talk to your doctor, nurse or pharmacist before following any medical regimen to see if it is safe and effective for you

## 2022-01-17 NOTE — PROGRESS NOTES
COVID-19 Outpatient Progress Note    Assessment/Plan:    Problem List Items Addressed This Visit     None      Visit Diagnoses     Lab test positive for detection of COVID-19 virus    -  Primary    Educated about COVID-19 virus infection             Disposition:     Patient has COVID-19 infection  Based off CDC guidelines, they were recommended to isolate for 5 days from the date of the positive test  If they remain asymptomatic, isolation may be ended followed by 5 days of wearing a mask when around othes to minimize risk of infecting others  If they have a fever, continue to stay home until fever resolves for at least 24 hours  I recommended continued isolation until at least 24 hours have passed since recovery defined as resolution of fever without the use of fever-reducing medications AND improvement in COVID symptoms AND 10 days have passed since onset of symptoms (or 10 days have passed since date of first positive viral diagnostic test for asymptomatic patients)  A/P: Day # 13 since onset of COVID s/s  Covid test was positive  Doing better now after adding abx, mucinex, etc  No fever or chills  Mainly nasal congestion and slight cough  Minimal BARRETT  Still with loss of taste/smell    Can lift  isolation/quarantine, but continue with increase fluids, PRN motrin/tylenol, and breathing exercises  Finish abx, mucinex, etc  RTC as scheduled for f/u  I have spent 15 minutes directly with the patient  Greater than 50% of this time was spent in counseling/coordination of care regarding: prognosis, risks and benefits of treatment options, instructions for management, patient and family education, importance of treatment compliance, risk factor reductions and impressions        Encounter provider Sangeeta Doss DO    Provider located at [address] [place] [phone]    Recent Visits  Date Type Provider Dept   01/14/[address], DO P[facility] AFFILIATED WITH [facility]   Showing recent visits within past 7 days and meeting all other requirements  Today's Visits  Date Type Provider Dept   01/17/22 Telemedicine DO Kobe Amado today's visits and meeting all other requirements  Future Appointments  No visits were found meeting these conditions  Showing future appointments within next 150 days and meeting all other requirements     This virtual check-in was done via [address] and patient was informed that this is a secure, HIPAA-compliant platform  He agrees to proceed  Patient agrees to participate in a virtual check in via telephone or video visit instead of presenting to the office to address urgent/immediate medical needs  Patient is aware this is a billable service  After connecting through [facility], the patient was identified by name and date of birth  Fred Mehta was informed that this was a telemedicine visit and that the exam was being conducted confidentially over secure lines  My office door was closed  Fred Mehta acknowledged consent and understanding of privacy and security of the telemedicine visit  I informed the patient that I have reviewed his record in Epic and presented the opportunity for him to ask any questions regarding the visit today  The patient agreed to participate  Verification of patient location:  Patient is located in the following state in which I hold an active license: [place]    Subjective:   Fred Mehta is a 27 y o  male who has been screened for COVID-19  Symptom change since last report: improving  Patient's symptoms include nasal congestion, rhinorrhea, anosmia, loss of taste, diarrhea and headache  Patient denies fever, chills, fatigue, sore throat, cough, shortness of breath, chest tightness, abdominal pain, nausea, vomiting and myalgias  - Date of symptom onset: 1/4/2022  - Date of positive COVID-19 test: 1/13/2022  Type of test: PCR       COVID-19 vaccination status: Not vaccinated    Xavier Soares has been staying home and has isolated themselves in his home  He is taking care to not share personal items and is cleaning all surfaces that are touched often, like counters, tabletops, and doorknobs using household cleaning sprays or wipes  He is wearing a mask when he leaves his room  Lab Results   Component Value Date    SARSCOV2 Positive (A) 01/03/2022    [facility] Not Detected 12/14/2021     Past Medical History:   Diagnosis Date    Bipolar 2 disorder ([facility] [address] )     MAURICE (generalized anxiety disorder) 10/10/2018    [facility] 10/10/2018    Manic behavior ([facility] )      Past Surgical History:   Procedure Laterality Date    MANDIBLE FRACTURE SURGERY      WISDOM TOOTH EXTRACTION       Current Outpatient Medications   Medication Sig Dispense Refill    azithromycin (ZITHROMAX) 250 mg tablet Take 2 tablets today then 1 tablet daily x 4 days 6 tablet 0    benzonatate (TESSALON) 200 MG capsule Take 1 capsule (200 mg total) by mouth 3 (three) times a day as needed for cough 20 capsule 0    fluticasone (FLONASE) 50 mcg/act nasal spray 1 spray into each nostril daily 16 g 0    guaiFENesin (Mucinex) 600 mg 12 hr tablet Take 1 tablet (600 mg total) by mouth every 12 (twelve) hours 20 tablet 0    buprenorphine-naloxone (Suboxone) 2-0 5 mg Half a strip sl q day  (Patient not taking: Reported on 12/16/2021 ) 15 Film 0    cloNIDine (CATAPRES) 0 1 mg tablet Take 1 tablet (0 1 mg total) by mouth 3 (three) times a day 90 tablet 5    NARCAN 4 MG/0 1ML LIQD instill 1 spray in 1 NOSTRIL if needed for opioid overdose may re     (REFER TO PRESCRIPTION NOTES)  0    zolpidem (AMBIEN) 10 mg tablet Take 1 tablet (10 mg total) by mouth daily at bedtime as needed for sleep (Patient not taking: Reported on 12/16/2021 ) 30 tablet 0     No current facility-administered medications for this visit       Allergies   Allergen Reactions    Topamax [Topiramate] Angioedema Review of Systems   Constitutional: Positive for activity change  Negative for chills, diaphoresis, fatigue and fever  HENT: Positive for congestion, postnasal drip and rhinorrhea  Negative for ear discharge, ear pain, sinus pressure, sinus pain and sore throat           loss of taste/smell  Respiratory: Negative for cough, chest tightness, shortness of breath and wheezing  Cardiovascular: Negative for chest pain, palpitations and leg swelling  Gastrointestinal: Positive for diarrhea  Negative for abdominal pain, constipation, nausea and vomiting  Genitourinary: Negative for difficulty urinating, dysuria and frequency  Musculoskeletal: Negative for arthralgias, gait problem and myalgias  Neurological: Positive for headaches  Negative for light-headedness  Psychiatric/Behavioral: Negative for confusion  The patient is not nervous/anxious  Objective:    Vitals:       Physical Exam  Vitals and nursing note reviewed  Constitutional:       General: He is not in acute distress  Appearance: Normal appearance  He is not ill-appearing  HENT:      Head: Normocephalic and atraumatic  Mouth/Throat:      Mouth: Mucous membranes are moist    Eyes:      Extraocular Movements: Extraocular movements intact  Conjunctiva/sclera: Conjunctivae normal       Pupils: Pupils are equal, round, and reactive to light  Pulmonary:      Effort: Pulmonary effort is normal  No respiratory distress  Neurological:      General: No focal deficit present  Mental Status: He is alert and oriented to person, place, and time  Mental status is at baseline  Psychiatric:         Mood and Affect: Mood normal          Behavior: Behavior normal          Thought Content: Thought content normal          Judgment: Judgment normal          VIRTUAL VISIT DISCLAIMER    Bonnie Espinoza verbally agrees to participate in [employer]   Pt is aware that Virtual Care Services could be limited without vital signs or the ability to perform a full hands-on physical Tatiana Cape understands he or the provider may request at any time to terminate the video visit and request the patient to seek care or treatment in person

## 2022-04-18 ENCOUNTER — OFFICE VISIT (OUTPATIENT)
Dept: FAMILY MEDICINE CLINIC | Facility: CLINIC | Age: 27
End: 2022-04-18
Payer: COMMERCIAL

## 2022-04-18 VITALS
TEMPERATURE: 97.2 F | WEIGHT: 133.2 LBS | HEART RATE: 100 BPM | DIASTOLIC BLOOD PRESSURE: 82 MMHG | BODY MASS INDEX: 21.41 KG/M2 | SYSTOLIC BLOOD PRESSURE: 142 MMHG | OXYGEN SATURATION: 100 % | HEIGHT: 66 IN

## 2022-04-18 DIAGNOSIS — Z11.4 SCREENING FOR HIV (HUMAN IMMUNODEFICIENCY VIRUS): ICD-10-CM

## 2022-04-18 DIAGNOSIS — Z23 ENCOUNTER FOR IMMUNIZATION: ICD-10-CM

## 2022-04-18 DIAGNOSIS — Z11.59 ENCOUNTER FOR HEPATITIS C VIRUS SCREENING TEST FOR HIGH RISK PATIENT: ICD-10-CM

## 2022-04-18 DIAGNOSIS — F51.01 PRIMARY INSOMNIA: ICD-10-CM

## 2022-04-18 DIAGNOSIS — Z76.89 ENCOUNTER TO ESTABLISH CARE WITH NEW DOCTOR: Primary | ICD-10-CM

## 2022-04-18 DIAGNOSIS — Z91.89 ENCOUNTER FOR HEPATITIS C VIRUS SCREENING TEST FOR HIGH RISK PATIENT: ICD-10-CM

## 2022-04-18 DIAGNOSIS — Z13.29 SCREENING FOR THYROID DISORDER: ICD-10-CM

## 2022-04-18 DIAGNOSIS — F41.1 GAD (GENERALIZED ANXIETY DISORDER): ICD-10-CM

## 2022-04-18 DIAGNOSIS — Z13.220 SCREENING FOR LIPID DISORDERS: ICD-10-CM

## 2022-04-18 DIAGNOSIS — F19.10 IV DRUG ABUSE (HCC): ICD-10-CM

## 2022-04-18 PROCEDURE — 3725F SCREEN DEPRESSION PERFORMED: CPT | Performed by: FAMILY MEDICINE

## 2022-04-18 PROCEDURE — 90471 IMMUNIZATION ADMIN: CPT

## 2022-04-18 PROCEDURE — 99204 OFFICE O/P NEW MOD 45 MIN: CPT | Performed by: FAMILY MEDICINE

## 2022-04-18 PROCEDURE — 90472 IMMUNIZATION ADMIN EACH ADD: CPT

## 2022-04-18 PROCEDURE — 1036F TOBACCO NON-USER: CPT | Performed by: FAMILY MEDICINE

## 2022-04-18 PROCEDURE — 3008F BODY MASS INDEX DOCD: CPT | Performed by: FAMILY MEDICINE

## 2022-04-18 PROCEDURE — 90746 HEPB VACCINE 3 DOSE ADULT IM: CPT

## 2022-04-18 PROCEDURE — 90632 HEPA VACCINE ADULT IM: CPT

## 2022-04-18 RX ORDER — MIRTAZAPINE 7.5 MG/1
7.5 TABLET, FILM COATED ORAL
Qty: 30 TABLET | Refills: 5 | Status: SHIPPED | OUTPATIENT
Start: 2022-04-18 | End: 2022-05-16 | Stop reason: ALTCHOICE

## 2022-04-18 NOTE — PROGRESS NOTES
Assessment/Plan:    No problem-specific Assessment & Plan notes found for this encounter  Diagnoses and all orders for this visit:    Encounter to establish care with new doctor  -     CBC and differential; Future  -     Comprehensive metabolic panel; Future    MAURICE (generalized anxiety disorder)    IV drug abuse ([facility] )  Comments: In remission, in active recovery    Primary insomnia  Comments:  Failed multiple agents, will trial Remeron  Orders:  -     mirtazapine (REMERON) 7 5 MG tablet; Take 1 tablet (7 5 mg total) by mouth daily at bedtime    Screening for HIV (human immunodeficiency virus)  -     HIV 1/2 Antigen/Antibody (4th Generation) w Reflex [facility]; Future    Screening for lipid disorders  -     Lipid panel; Future    Screening for thyroid disorder  -     TSH, 3rd generation with Free T4 reflex; Future    Encounter for hepatitis C virus screening test for high risk patient  -     Hepatitis C antibody; Future    Encounter for immunization  -     HEPATITIS A VACCINE ADULT IM  -     HEPATITIS B VACCINE ADULT IM          PHQ-2/9 Depression Screening    Little interest or pleasure in doing things: 0 - not at all  Feeling down, depressed, or hopeless: 0 - not at all  PHQ-2 Score: 0  PHQ-2 Interpretation: Negative depression screen            Subjective:      Patient ID: Kristal Balbuena is a 27 y o  male  Patient presents for NP establishment, he has a hx of IVDA, he has 4 years clean while he has been on suboxone maintenance therapy  He does go to , he has a sponsor  Stopped suboxone 0 25 for 10 days and stopped  He has anxiety and not sleeping since off of it  He was on [place] for some time and feels it is the only thing that did help  He has a hx of bipolar disorder  He was on several different agents without much success  He did not tolerate trazodone, seroquel, lunesta, restoril, melatonin, valerian root  He has dealt with insomnia most of his life  He has not been depressed or manic    The clonidine helps him fall asleep but he cannot stay asleep  The following portions of the patient's history were reviewed and updated as appropriate: allergies, current medications, past family history, past medical history, past social history, past surgical history and problem list     Review of Systems   Constitutional: Negative  Negative for chills, fatigue and fever  HENT: Negative  Eyes: Negative  Negative for visual disturbance  Respiratory: Negative for cough, chest tightness, shortness of breath and wheezing  Cardiovascular: Negative for chest pain and palpitations  Gastrointestinal: Negative for abdominal pain, blood in stool, constipation, diarrhea, nausea and vomiting  Endocrine: Negative  Genitourinary: Negative for difficulty urinating, dysuria, frequency, hematuria and urgency  Musculoskeletal: Negative for arthralgias and myalgias  Skin: Negative  Allergic/Immunologic: Negative  Neurological: Negative for dizziness, seizures, syncope, light-headedness and headaches  Hematological: Negative for adenopathy  Psychiatric/Behavioral: Positive for sleep disturbance  Negative for dysphoric mood  The patient is not nervous/anxious  Objective:    /82 (BP Location: Left arm, Patient Position: Sitting)   Pulse 100   Temp (!) 97 2 °F (36 2 °C) (Tympanic)   Ht 5' 5 5" (1 664 m)   Wt 60 4 kg (133 lb 3 2 oz)   SpO2 100%   BMI 21 83 kg/m²      Physical Exam  Vitals and nursing note reviewed  Constitutional:       General: He is not in acute distress  Appearance: Normal appearance  He is well-developed  He is not ill-appearing, toxic-appearing or diaphoretic  HENT:      Head: Normocephalic and atraumatic  Right Ear: Tympanic membrane, ear canal and external ear normal  There is no impacted cerumen  Left Ear: Tympanic membrane, ear canal and external ear normal  There is no impacted cerumen  Nose: Nose normal  No congestion or rhinorrhea  Mouth/Throat:      Mouth: Mucous membranes are moist       Pharynx: Oropharynx is clear  No oropharyngeal exudate or posterior oropharyngeal erythema  Eyes:      General: No scleral icterus  Right eye: No discharge  Left eye: No discharge  Extraocular Movements: Extraocular movements intact  Conjunctiva/sclera: Conjunctivae normal       Pupils: Pupils are equal, round, and reactive to light  Cardiovascular:      Rate and Rhythm: Normal rate and regular rhythm  Pulses: Normal pulses  Heart sounds: Normal heart sounds  No murmur heard  No friction rub  No gallop  Pulmonary:      Effort: Pulmonary effort is normal  No respiratory distress  Breath sounds: Normal breath sounds  No wheezing, rhonchi or rales  Abdominal:      General: Bowel sounds are normal  There is no distension  Palpations: Abdomen is soft  There is no mass  Tenderness: There is no abdominal tenderness  There is no guarding or rebound  Musculoskeletal:         General: No swelling or deformity  Normal range of motion  Cervical back: Normal range of motion and neck supple  No rigidity  Right lower leg: No edema  Left lower leg: No edema  Lymphadenopathy:      Cervical: No cervical adenopathy  Skin:     General: Skin is warm and dry  Findings: No rash  Neurological:      General: No focal deficit present  Mental Status: He is alert and oriented to person, place, and time  Sensory: No sensory deficit  Motor: No weakness  Coordination: Coordination normal       Gait: Gait normal       Deep Tendon Reflexes: Reflexes are normal and symmetric  Reflexes normal    Psychiatric:         Mood and Affect: Mood normal          Behavior: Behavior normal          Thought Content:  Thought content normal          Judgment: Judgment normal

## 2022-05-10 ENCOUNTER — APPOINTMENT (OUTPATIENT)
Dept: LAB | Facility: CLINIC | Age: 27
End: 2022-05-10
Payer: COMMERCIAL

## 2022-05-10 DIAGNOSIS — Z11.59 ENCOUNTER FOR HEPATITIS C VIRUS SCREENING TEST FOR HIGH RISK PATIENT: ICD-10-CM

## 2022-05-10 DIAGNOSIS — Z76.89 ENCOUNTER TO ESTABLISH CARE WITH NEW DOCTOR: ICD-10-CM

## 2022-05-10 DIAGNOSIS — Z13.220 SCREENING FOR LIPID DISORDERS: ICD-10-CM

## 2022-05-10 DIAGNOSIS — Z11.4 SCREENING FOR HIV (HUMAN IMMUNODEFICIENCY VIRUS): ICD-10-CM

## 2022-05-10 DIAGNOSIS — Z91.89 ENCOUNTER FOR HEPATITIS C VIRUS SCREENING TEST FOR HIGH RISK PATIENT: ICD-10-CM

## 2022-05-10 DIAGNOSIS — Z13.29 SCREENING FOR THYROID DISORDER: ICD-10-CM

## 2022-05-10 LAB
ALBUMIN SERPL BCP-MCNC: 4.1 G/DL (ref 3.5–5)
ALP SERPL-CCNC: 53 U/L (ref 46–116)
ALT SERPL W P-5'-P-CCNC: 30 U/L (ref 12–78)
ANION GAP SERPL CALCULATED.3IONS-SCNC: 2 MMOL/L (ref 4–13)
AST SERPL W P-5'-P-CCNC: 21 U/L (ref 5–45)
BASOPHILS # BLD AUTO: 0.03 THOUSANDS/ΜL (ref 0–0.1)
BASOPHILS NFR BLD AUTO: 1 % (ref 0–1)
BILIRUB SERPL-MCNC: 0.67 MG/DL (ref 0.2–1)
BUN SERPL-MCNC: 14 MG/DL (ref 5–25)
CALCIUM SERPL-MCNC: 9.4 MG/DL (ref 8.3–10.1)
CHLORIDE SERPL-SCNC: 105 MMOL/L (ref 100–108)
CHOLEST SERPL-MCNC: 138 MG/DL
CO2 SERPL-SCNC: 31 MMOL/L (ref 21–32)
CREAT SERPL-MCNC: 0.88 MG/DL (ref 0.6–1.3)
EOSINOPHIL # BLD AUTO: 0.09 THOUSAND/ΜL (ref 0–0.61)
EOSINOPHIL NFR BLD AUTO: 3 % (ref 0–6)
ERYTHROCYTE [DISTWIDTH] IN BLOOD BY AUTOMATED COUNT: 12.2 % (ref 11.6–15.1)
GFR SERPL CREATININE-BSD FRML MDRD: 116 ML/MIN/1.73SQ M
GLUCOSE P FAST SERPL-MCNC: 96 MG/DL (ref 65–99)
HCT VFR BLD AUTO: 39 % (ref 36.5–49.3)
HDLC SERPL-MCNC: 56 MG/DL
HGB BLD-MCNC: 13.5 G/DL (ref 12–17)
IMM GRANULOCYTES # BLD AUTO: 0.01 THOUSAND/UL (ref 0–0.2)
IMM GRANULOCYTES NFR BLD AUTO: 0 % (ref 0–2)
LDLC SERPL CALC-MCNC: 71 MG/DL (ref 0–100)
LYMPHOCYTES # BLD AUTO: 1.44 THOUSANDS/ΜL (ref 0.6–4.47)
LYMPHOCYTES NFR BLD AUTO: 41 % (ref 14–44)
MCH RBC QN AUTO: 29 PG (ref 26.8–34.3)
MCHC RBC AUTO-ENTMCNC: 34.6 G/DL (ref 31.4–37.4)
MCV RBC AUTO: 84 FL (ref 82–98)
MONOCYTES # BLD AUTO: 0.45 THOUSAND/ΜL (ref 0.17–1.22)
MONOCYTES NFR BLD AUTO: 13 % (ref 4–12)
NEUTROPHILS # BLD AUTO: 1.46 THOUSANDS/ΜL (ref 1.85–7.62)
NEUTS SEG NFR BLD AUTO: 42 % (ref 43–75)
NONHDLC SERPL-MCNC: 82 MG/DL
NRBC BLD AUTO-RTO: 0 /100 WBCS
PLATELET # BLD AUTO: 299 THOUSANDS/UL (ref 149–390)
PMV BLD AUTO: 10.3 FL (ref 8.9–12.7)
POTASSIUM SERPL-SCNC: 3.6 MMOL/L (ref 3.5–5.3)
PROT SERPL-MCNC: 7.6 G/DL (ref 6.4–8.2)
RBC # BLD AUTO: 4.65 MILLION/UL (ref 3.88–5.62)
SODIUM SERPL-SCNC: 138 MMOL/L (ref 136–145)
TRIGL SERPL-MCNC: 54 MG/DL
TSH SERPL DL<=0.05 MIU/L-ACNC: 1.04 UIU/ML (ref 0.45–4.5)
WBC # BLD AUTO: 3.48 THOUSAND/UL (ref 4.31–10.16)

## 2022-05-10 PROCEDURE — 36415 COLL VENOUS BLD VENIPUNCTURE: CPT

## 2022-05-10 PROCEDURE — 80061 LIPID PANEL: CPT

## 2022-05-10 PROCEDURE — 85025 COMPLETE CBC W/AUTO DIFF WBC: CPT

## 2022-05-10 PROCEDURE — 80053 COMPREHEN METABOLIC PANEL: CPT

## 2022-05-10 PROCEDURE — 86803 HEPATITIS C AB TEST: CPT

## 2022-05-10 PROCEDURE — 87389 HIV-1 AG W/HIV-1&-2 AB AG IA: CPT

## 2022-05-10 PROCEDURE — 84443 ASSAY THYROID STIM HORMONE: CPT

## 2022-05-11 LAB
HCV AB SER QL: ABNORMAL
HIV 1+2 AB+HIV1 P24 AG SERPL QL IA: NORMAL

## 2022-05-16 ENCOUNTER — OFFICE VISIT (OUTPATIENT)
Dept: FAMILY MEDICINE CLINIC | Facility: CLINIC | Age: 27
End: 2022-05-16
Payer: COMMERCIAL

## 2022-05-16 VITALS
HEART RATE: 111 BPM | SYSTOLIC BLOOD PRESSURE: 152 MMHG | BODY MASS INDEX: 20.83 KG/M2 | OXYGEN SATURATION: 97 % | WEIGHT: 129.6 LBS | TEMPERATURE: 97.9 F | DIASTOLIC BLOOD PRESSURE: 72 MMHG | HEIGHT: 66 IN

## 2022-05-16 DIAGNOSIS — F51.01 PRIMARY INSOMNIA: ICD-10-CM

## 2022-05-16 DIAGNOSIS — Z00.00 ANNUAL PHYSICAL EXAM: Primary | ICD-10-CM

## 2022-05-16 DIAGNOSIS — F19.11 DRUG ABUSE IN REMISSION (HCC): ICD-10-CM

## 2022-05-16 DIAGNOSIS — B18.2 CHRONIC HEPATITIS C WITHOUT HEPATIC COMA (HCC): ICD-10-CM

## 2022-05-16 PROCEDURE — 99395 PREV VISIT EST AGE 18-39: CPT | Performed by: FAMILY MEDICINE

## 2022-05-16 PROCEDURE — 99214 OFFICE O/P EST MOD 30 MIN: CPT | Performed by: FAMILY MEDICINE

## 2022-05-16 PROCEDURE — 3008F BODY MASS INDEX DOCD: CPT | Performed by: FAMILY MEDICINE

## 2022-05-16 RX ORDER — BUPRENORPHINE HYDROCHLORIDE 8 MG/1
TABLET SUBLINGUAL
COMMUNITY
Start: 2022-05-05

## 2022-05-16 NOTE — PROGRESS NOTES
Assessment/Plan:    No problem-specific Assessment & Plan notes found for this encounter  Diagnoses and all orders for this visit:    Annual physical exam    Primary insomnia  Comments:  D/C remeron, refer sleep medicine  Orders:  -     Ambulatory Referral to Sleep Medicine; Future    Chronic hepatitis C without hepatic coma (HCC)  -     Hepatitis C RNA, quantitative, PCR; Future  -     Ambulatory Referral to Gastroenterology; Future    Drug abuse in remission [facility])  Comments:  Continue subtex, 12 step NA meetings, sponsorship    Other orders  -     buprenorphine (SUBUTEX) 8 mg; dissolve 1 tablet under the tongue three times a day DO NOT chew or swallow          PHQ-2/9 Depression Screening    Little interest or pleasure in doing things: 0 - not at all  Feeling down, depressed, or hopeless: 0 - not at all  PHQ-2 Score: 0  PHQ-2 Interpretation: Negative depression screen            Subjective:      Patient ID: Gabino Borges is a 32 y o  male  Patient presents for follow-up for a physical and his chronic conditions, he reported having essentially restless legs while taking the Remeron, he did not do well on the trazodone in the past as well, he is inquiring whether he should slightly a sleep specialist given the number of medications for sleep that he has failed  Patient has a 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 in remission, he had been off Suboxone for some time but recently started Subutex  He continues to remain abstinent from all drugs  I did review his labs with him, his CBC shows a slightly decreased white blood cell count likely transient, CMP is normal including liver enzymes, TSH is normal, HIV is negative, hepatitis-C is highly reactive  Total cholesterol 138, triglycerides 54, HDL 56, LDL 71    He denies ritu-colored stools, no nausea no vomiting no diarrhea no abdominal pain      The following portions of the patient's history were reviewed and updated as appropriate: allergies, current medications, past family history, past medical history, past social history, past surgical history and problem list     Review of Systems   Constitutional: Negative  Negative for chills, fatigue and fever  HENT: Negative  Eyes: Negative  Negative for visual disturbance  Respiratory: Negative for cough, chest tightness, shortness of breath and wheezing  Cardiovascular: Negative for chest pain and palpitations  Gastrointestinal: Negative for abdominal pain, blood in stool, constipation, diarrhea, nausea and vomiting  Endocrine: Negative  Genitourinary: Negative for difficulty urinating, dysuria, frequency, hematuria and urgency  Musculoskeletal: Negative for arthralgias and myalgias  Skin: Negative  Allergic/Immunologic: Negative  Neurological: Negative for dizziness, seizures, syncope, weakness, light-headedness and headaches  Hematological: Negative for adenopathy  Psychiatric/Behavioral: Positive for sleep disturbance  Negative for dysphoric mood  The patient is not nervous/anxious  Objective:    /72 (BP Location: Left arm, Patient Position: Sitting)   Pulse (!) 111   Temp 97 9 °F (36 6 °C) (Tympanic)   Ht 5' 5 5" (1 664 m)   Wt 58 8 kg (129 lb 9 6 oz)   SpO2 97%   BMI 21 24 kg/m²      Physical Exam  Vitals and nursing note reviewed  Constitutional:       General: He is not in acute distress  Appearance: Normal appearance  He is well-developed  He is not ill-appearing, toxic-appearing or diaphoretic  HENT:      Head: Normocephalic and atraumatic  Right Ear: Tympanic membrane, ear canal and external ear normal  There is no impacted cerumen  Left Ear: Tympanic membrane, ear canal and external ear normal  There is no impacted cerumen  Nose: Nose normal  No congestion or rhinorrhea  Mouth/Throat:      Mouth: Mucous membranes are moist       Pharynx: Oropharynx is clear   No oropharyngeal exudate or posterior oropharyngeal erythema  Eyes:      General: No scleral icterus  Right eye: No discharge  Left eye: No discharge  Extraocular Movements: Extraocular movements intact  Conjunctiva/sclera: Conjunctivae normal       Pupils: Pupils are equal, round, and reactive to light  Cardiovascular:      Rate and Rhythm: Normal rate and regular rhythm  Pulses: Normal pulses  Heart sounds: Normal heart sounds  No murmur heard  No friction rub  No gallop  Pulmonary:      Effort: Pulmonary effort is normal  No respiratory distress  Breath sounds: Normal breath sounds  No wheezing, rhonchi or rales  Abdominal:      General: Bowel sounds are normal  There is no distension  Palpations: Abdomen is soft  There is no mass  Tenderness: There is no abdominal tenderness  There is no guarding or rebound  Musculoskeletal:         General: No swelling or deformity  Normal range of motion  Cervical back: Normal range of motion and neck supple  No rigidity  Right lower leg: No edema  Left lower leg: No edema  Lymphadenopathy:      Cervical: No cervical adenopathy  Skin:     General: Skin is warm and dry  Findings: No rash  Neurological:      General: No focal deficit present  Mental Status: He is alert and oriented to person, place, and time  Sensory: No sensory deficit  Motor: No weakness  Coordination: Coordination normal       Gait: Gait normal       Deep Tendon Reflexes: Reflexes are normal and symmetric  Reflexes normal    Psychiatric:         Mood and Affect: Mood normal          Behavior: Behavior normal          Thought Content:  Thought content normal          Judgment: Judgment normal

## 2022-05-16 NOTE — PATIENT INSTRUCTIONS
Recheck 6m - Hep C +  Wellness Visit for Adults   AMBULATORY CARE:   A wellness visit  is when you see your healthcare provider to get screened for health problems  Your healthcare provider will also give you advice on how to stay healthy  Write down your questions so you remember to ask them  Ask your healthcare provider how often you should have a wellness visit  What happens at a wellness visit:  Your healthcare provider will ask about your health, and your family history of health problems  This includes high blood pressure, heart disease, and cancer  He or she will ask if you have symptoms that concern you, if you smoke, and about your mood  You may also be asked about your intake of medicines, supplements, food, and alcohol  Any of the following may be done:  · Your weight  will be checked  Your height may also be checked so your body mass index (BMI) can be calculated  Your BMI shows if you are at a healthy weight  · Your blood pressure  and heart rate will be checked  Your temperature may also be checked  · Blood and urine tests  may be done  Blood tests may be done to check your cholesterol levels  Abnormal cholesterol levels increase your risk for heart disease and stroke  You may also need a blood or urine test to check for diabetes if you are at increased risk  Urine tests may be done to look for signs of an infection or kidney disease  · A physical exam  includes checking your heartbeat and lungs with a stethoscope  Your healthcare provider may also check your skin to look for sun damage  · Screening tests  may be recommended  A screening test is done to check for diseases that may not cause symptoms  The screening tests you may need depend on your age, gender, family history, and lifestyle habits  For example, colorectal screening may be recommended if you are 48years old or older  Screening tests you need if you are a woman:   · A Pap smear  is used to screen for cervical cancer   Pap smears are usually done every 3 to 5 years depending on your age  You may need them more often if you have had abnormal Pap smear test results in the past  Ask your healthcare provider how often you should have a Pap smear  · A mammogram  is an x-ray of your breasts to screen for breast cancer  Experts recommend mammograms every 2 years starting at age 48 years  You may need a mammogram at age 52 years or younger if you have an increased risk for breast cancer  Talk to your healthcare provider about when you should start having mammograms and how often you need them  Vaccines you may need:   · Get an influenza vaccine  every year  The influenza vaccine protects you from the flu  Several types of viruses cause the flu  The viruses change over time, so new vaccines are made each year  · Get a tetanus-diphtheria (Td) booster vaccine  every 10 years  This vaccine protects you against tetanus and diphtheria  Tetanus is a severe infection that may cause painful muscle spasms and lockjaw  Diphtheria is a severe bacterial infection that causes a thick covering in the back of your mouth and throat  · Get a human papillomavirus (HPV) vaccine  if you are female and aged 23 to 32 or male 23 to 24 and never received it  This vaccine protects you from HPV infection  HPV is the most common infection spread by sexual contact  HPV may also cause vaginal, penile, and anal cancers  · Get a pneumococcal vaccine  if you are aged 72 years or older  The pneumococcal vaccine is an injection given to protect you from pneumococcal disease  Pneumococcal disease is an infection caused by pneumococcal bacteria  The infection may cause pneumonia, meningitis, or an ear infection  · Get a shingles vaccine  if you are 60 or older, even if you have had shingles before  The shingles vaccine is an injection to protect you from the varicella-zoster virus  This is the same virus that causes chickenpox   Shingles is a painful rash that develops in people who had chickenpox or have been exposed to the virus  How to eat healthy:  My Plate is a model for planning healthy meals  It shows the types and amounts of foods that should go on your plate  Fruits and vegetables make up about half of your plate, and grains and protein make up the other half  A serving of dairy is included on the side of your plate  The amount of calories and serving sizes you need depends on your age, gender, weight, and height  Examples of healthy foods are listed below:  · Eat a variety of vegetables  such as dark green, red, and orange vegetables  You can also include canned vegetables low in sodium (salt) and frozen vegetables without added butter or sauces  · Eat a variety of fresh fruits , canned fruit in 100% juice, frozen fruit, and dried fruit  · Include whole grains  At least half of the grains you eat should be whole grains  Examples include whole-wheat bread, wheat pasta, brown rice, and whole-grain cereals such as oatmeal     · Eat a variety of protein foods such as seafood (fish and shellfish), lean meat, and poultry without skin (turkey and chicken)  Examples of lean meats include pork leg, shoulder, or tenderloin, and beef round, sirloin, tenderloin, and extra lean ground beef  Other protein foods include eggs and egg substitutes, beans, peas, soy products, nuts, and seeds  · Choose low-fat dairy products such as skim or 1% milk or low-fat yogurt, cheese, and cottage cheese  · Limit unhealthy fats  such as butter, hard margarine, and shortening  Exercise:  Exercise at least 30 minutes per day on most days of the week  Some examples of exercise include walking, biking, dancing, and swimming  You can also fit in more physical activity by taking the stairs instead of the elevator or parking farther away from stores  Include muscle strengthening activities 2 days each week  Regular exercise provides many health benefits   It helps you manage your weight, and decreases your risk for type 2 diabetes, heart disease, stroke, and high blood pressure  Exercise can also help improve your mood  Ask your healthcare provider about the best exercise plan for you  General health and safety guidelines:   · Do not smoke  Nicotine and other chemicals in cigarettes and cigars can cause lung damage  Ask your healthcare provider for information if you currently smoke and need help to quit  E-cigarettes or smokeless tobacco still contain nicotine  Talk to your healthcare provider before you use these products  · Limit alcohol  A drink of alcohol is 12 ounces of beer, 5 ounces of wine, or 1½ ounces of liquor  · Lose weight, if needed  Being overweight increases your risk of certain health conditions  These include heart disease, high blood pressure, type 2 diabetes, and certain types of cancer  · Protect your skin  Do not sunbathe or use tanning beds  Use sunscreen with a SPF 15 or higher  Apply sunscreen at least 15 minutes before you go outside  Reapply sunscreen every 2 hours  Wear protective clothing, hats, and sunglasses when you are outside  · Drive safely  Always wear your seatbelt  Make sure everyone in your car wears a seatbelt  A seatbelt can save your life if you are in an accident  Do not use your cell phone when you are driving  This could distract you and cause an accident  Pull over if you need to make a call or send a text message  · Practice safe sex  Use latex condoms if are sexually active and have more than one partner  Your healthcare provider may recommend screening tests for sexually transmitted infections (STIs)  · Wear helmets, lifejackets, and protective gear  Always wear a helmet when you ride a bike or motorcycle, go skiing, or play sports that could cause a head injury  Wear protective equipment when you play sports  Wear a lifejacket when you are on a boat or doing water sports      © Copyright [employer] 2022 Information is for End User's use only and may not be sold, redistributed or otherwise used for commercial purposes  All illustrations and images included in CareNotes® are the copyrighted property of A D A M [employer] Inc  or [employer]  The above information is an  only  It is not intended as medical advice for individual conditions or treatments  Talk to your doctor, nurse or pharmacist before following any medical regimen to see if it is safe and effective for you

## 2022-05-16 NOTE — PROGRESS NOTES
Leonardo    NAME: Roxanne Bourgeois  AGE: 32 y o  SEX: male  : 1995     DATE: 2022     Assessment and Plan:     Problem List Items Addressed This Visit        Other    Primary insomnia    Relevant Orders    Ambulatory Referral to Sleep Medicine      Other Visit Diagnoses     Annual physical exam    -  Primary    Chronic hepatitis C without hepatic coma ([facility] [address] )        Relevant Orders    Hepatitis C RNA, quantitative, PCR    Ambulatory Referral to Gastroenterology          Immunizations and preventive care screenings were discussed with patient today  Appropriate education was printed on patient's after visit summary  Counseling:  · Alcohol/drug use: discussed moderation in alcohol intake, the recommendations for healthy alcohol use, and avoidance of illicit drug use  Return in about 6 months (around 2022)  Chief Complaint:     Chief Complaint   Patient presents with    Physical Exam     4 week follow up lab review      History of Present Illness:     Adult Annual Physical   Patient here for a comprehensive physical exam  The patient reports no problems  Diet and Physical Activity  · Diet/Nutrition: limited junk food and consuming 3-5 servings of fruits/vegetables daily  · Exercise: no formal exercise  Depression Screening  PHQ-2/9 Depression Screening    Little interest or pleasure in doing things: 0 - not at all  Feeling down, depressed, or hopeless: 0 - not at all  PHQ-2 Score: 0  PHQ-2 Interpretation: Negative depression screen       General Health  · Sleep: gets 4-6 hours of sleep on average  · Hearing: normal - bilateral   · Vision: no vision problems  · Dental: no dental visits for >1 year, brushes teeth twice daily and flosses teeth occasionally   Health  · History of STDs?: no      Review of Systems:     Review of Systems   Constitutional: Negative    Negative for chills, fatigue and fever  HENT: Negative  Eyes: Negative  Negative for visual disturbance  Respiratory: Negative for cough, shortness of breath and wheezing  Cardiovascular: Negative for chest pain and palpitations  Gastrointestinal: Negative for abdominal pain, blood in stool, constipation, diarrhea, nausea and vomiting  Endocrine: Negative  Genitourinary: Negative for difficulty urinating, dysuria, frequency and urgency  Musculoskeletal: Negative for arthralgias and myalgias  Skin: Negative  Allergic/Immunologic: Negative  Neurological: Positive for tremors  Negative for dizziness, seizures, syncope, weakness, light-headedness and headaches  Nighttime   Hematological: Negative for adenopathy  Psychiatric/Behavioral: Positive for sleep disturbance  Negative for dysphoric mood  The patient is not nervous/anxious         Past Medical History:     Past Medical History:   Diagnosis Date    Bipolar 2 disorder ([place] [id number] )     MAURICE (generalized anxiety disorder) 10/10/2018    Heroin abuse ([place] [address] ) 10/10/2018    Manic behavior ([place] [address] )       Past Surgical History:     Past Surgical History:   Procedure Laterality Date    MANDIBLE FRACTURE SURGERY      WISDOM TOOTH EXTRACTION        Social History:     Social History     Socioeconomic History    Marital status: Single     Spouse name: None    Number of children: None    Years of education: None    Highest education level: None   Occupational History    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   Tobacco Use    Smoking status: Former Smoker     Packs/day: 0 50     Years: 6 00     Pack years: 3 00    Smokeless tobacco: Former User     Types: Chew    Tobacco comment: Vapes- current user 4/18/22   Substance and Sexual Activity    Alcohol use: No    Drug use: Yes     Types: Amphetamines, Cocaine, Hydrocodone, Heroin, Marijuana, Oxycodone     Comment: Hasn't used since 9/2/2018    Sexual activity: Yes     Partners: Female   Other Topics Concern    None   Social History Narrative    Single    No children    Lives with his mother  Works as   Social Determinants of Health     Financial Resource Strain: Not on file   Food Insecurity: Not on file   Transportation Needs: Not on file   Physical Activity: Not on file   Stress: Not on file   Social Connections: Not on file   Intimate Partner Violence: Not on file   Housing Stability: Not on file      Family History:     Family History   Problem Relation Age of Onset    Seizures Mother     Multiple sclerosis Mother     Hepatitis Father       Current Medications:     Current Outpatient Medications   Medication Sig Dispense Refill    buprenorphine (SUBUTEX) 8 mg dissolve 1 tablet under the tongue three times a day DO NOT chew or swallow      cloNIDine (CATAPRES) 0 1 mg tablet Take 1 tablet (0 1 mg total) by mouth 3 (three) times a day 90 tablet 5     No current facility-administered medications for this visit  Allergies: Allergies   Allergen Reactions    Topamax [Topiramate] Angioedema      Physical Exam:     /72 (BP Location: Left arm, Patient Position: Sitting)   Pulse (!) 111   Temp 97 9 °F (36 6 °C) (Tympanic)   Ht 5' 5 5" (1 664 m)   Wt 58 8 kg (129 lb 9 6 oz)   SpO2 97%   BMI 21 24 kg/m²     Physical Exam  Vitals and nursing note reviewed  Constitutional:       General: He is not in acute distress  Appearance: Normal appearance  He is well-developed  He is not ill-appearing, toxic-appearing or diaphoretic  HENT:      Head: Normocephalic and atraumatic  Right Ear: Tympanic membrane, ear canal and external ear normal       Left Ear: Tympanic membrane, ear canal and external ear normal       Mouth/Throat:      Mouth: Mucous membranes are moist       Pharynx: Oropharynx is clear  Eyes:      Conjunctiva/sclera: Conjunctivae normal    Neck:      Vascular: No carotid bruit  Cardiovascular:      Rate and Rhythm: Normal rate and regular rhythm        Pulses: Normal pulses  Heart sounds: Normal heart sounds  No murmur heard  Pulmonary:      Effort: Pulmonary effort is normal  No respiratory distress  Breath sounds: Normal breath sounds  Abdominal:      General: Abdomen is flat  There is no distension  Palpations: Abdomen is soft  There is no mass  Tenderness: There is no abdominal tenderness  Musculoskeletal:      Cervical back: Neck supple  No muscular tenderness  Right lower leg: No edema  Left lower leg: No edema  Lymphadenopathy:      Cervical: No cervical adenopathy  Skin:     General: Skin is warm and dry  Neurological:      General: No focal deficit present  Mental Status: He is alert and oriented to person, place, and time  Cranial Nerves: No cranial nerve deficit  Sensory: No sensory deficit  Psychiatric:         Mood and Affect: Mood normal          Behavior: Behavior normal          Thought Content:  Thought content normal          Judgment: Judgment normal           Hawa Gandhi DO   [address] [facility]

## 2022-07-20 NOTE — PROGRESS NOTES
[facility] Gastroenterology  Gastroenterology Outpatient Consultation  Patient Misha Robles   Age 32 y o  Gender male   MRN: [id number]  [facility] [id number]     ASSESSMENT AND PLAN:   Problem List Items Addressed This Visit        Other    Hepatitis C antibody positive in blood - Primary     Shreya Barrios has hepatitis-C antibody positive blood test   He does have a history of drug use starting at around age 11 in 2008 leading to IV drug use around 2011  He also has a history of tattoos  He does not recall ever sharing needles  He does not recall hepatitis like illness  In reviewing his laboratory test his liver enzymes were abnormal back in April 2018  He was hospitalized with a drug overdose at that time  Check hepatitis C RNA quantitative test by PCR  Check hepatitis C genotype  Check ultrasound abdomen  Check ultrasound elastography  Check hepatitis A and B serologies  If he is not immune to hepatitisA for hepatitis-B he should be vaccinated for these  If his hepatitis C RNA is negative he should not need any the other above testing  Check KASHIF  Repeat CBC  Check chemistry complete  We did talk about the use of anti retroviral therapy with plan min Ace inhibitors  Treatment could include shay Contreras, treatment may span anywhere from 8-12 weeks  We also discussed ways to prevent transmission of hepatitis-C  Ways to prevent transmission of Hepatitis C:  -Avoid sharing toothbrushes and other dental equipment   -Avoid sharing razors  -Do not donate blood  -If using Illicit drugs you should stop using and enter into substance abuse treatment program  -If bleeding make sure others are not exposed to your blood           Relevant Orders    US abdomen complete    US elastography/UGAP    Hepatitis B core antibody, total    Hepatitis A antibody, total    Hepatitis B surface antibody    Hepatitis B surface antigen    Hepatitis C antibody    KASHIF Screen w/ Reflex to Titer/Pattern    CBC    Comprehensive metabolic panel    Hepatitis C RNA, quantitative, PCR    Hepatitis C genotype    Nausea     Does report intermittent nausea maybe a couple times a week  At times it is worse with eating  He has not had any significant weight loss  Denies any heartburn indigestion  I suspect his nausea may be more related to his Subutex  He will be following up with his addiction specialist in the near future and talk about decreasing the dose of this  If his symptoms persist then we can consider further evaluation possibly with upper endoscopy  He could consider starting Pepcid 20 mg once a day or twice a day to see if this helps with his nausea  Other Visit Diagnoses     Chronic hepatitis C without hepatic coma ([facility] [address] )             _____________________________________________________________    HPI:   Sylvia Reeder is a delightful 41-year-old gentleman who I am seeing in the office in consultation at the request of Dr Rosa Isela Brand for hepatitis C antibody positivity  Past does not recall ever having hepatitis like illness  He does not recall ever being jaundice  He did use IV drugs from the age of 14 to about age 23, he did then use IV drugs intermittently into about 4 years ago  He has been free of any narcotics for last 4 years  He has been on Suboxone initially but got migraines and is currently on subutex 8 mg 3 times a day  He feel that he could lower his dose  He does report feeling nausea 2 to 3 times a week  Sometimes he will feel nausea after eating but there has been no vomiting  He has not had a significant mental weight loss  Does report that he is not sleeping well  He does have some mild anxiety but does not feel that this is excessive  He does have 6 tattoos  His bowel habits have been fairly regular  Denies any constipation rectal bleeding or black stools  His dad does have hepatitis-C thought to acquired through tattoo placement  He just recently underwent treatment for this    There is no family members with cirrhosis  He does vape nicotine  He does not drink alcohol  He has used narcotics as outlined above  Records reviewed noted below    05/10/2022 laboratory data  Sodium 138  Creatinine 0 88  AST 21  ALT 30  T bili 0 67  WBC 3 48 HGB 13 5 HCT 39 platelet count [id number]  Hepatitis C antibody highly reactive  HIV negative      01/03/2022 SARs-CoV 2 positive    06/06/2017 CTA chest  Negative for pulmonary emboli visualized portions of the upper abdomen normal       Allergies   Allergen Reactions    Topamax [Topiramate] Angioedema     Current Outpatient Medications   Medication Sig Dispense Refill    buprenorphine (SUBUTEX) 8 mg dissolve 1 tablet under the tongue three times a day DO NOT chew or swallow      cloNIDine (CATAPRES) 0 1 mg tablet Take 1 tablet (0 1 mg total) by mouth 3 (three) times a day 90 tablet 5     No current facility-administered medications for this visit  MEDICAL HISTORY:  Past Medical History:   Diagnosis Date    Bipolar 2 disorder ([facility] )     MAURICE (generalized anxiety disorder) 10/10/2018    Heroin abuse ([place] [address] ) 10/10/2018    Manic behavior ([place] [address] )      Past Surgical History:   Procedure Laterality Date    MANDIBLE FRACTURE SURGERY      WISDOM TOOTH EXTRACTION       Social History     Substance and Sexual Activity   Alcohol Use No     Social History     Substance and Sexual Activity   Drug Use Yes    Types: Amphetamines, Cocaine, Hydrocodone, Heroin, Marijuana, Oxycodone    Comment: Hasn't used since 9/2/2018     Social History     Tobacco Use   Smoking Status Former Smoker    Packs/day: 0 50    Years: 6 00    Pack years: 3 00   Smokeless Tobacco Former User    Types: Chew   Tobacco Comment    Vapes- current user 4/18/22     Family History   Problem Relation Age of Onset    Seizures Mother     Multiple sclerosis Mother     Hepatitis Father        REVIEW OF SYSTEMS:  CONSTITUTIONAL: Denies any fever, chills, rigors, and weight loss    HEENT: No earache or tinnitus  Denies hearing loss or visual disturbances  CARDIOVASCULAR: No chest pain or palpitations  RESPIRATORY: Denies any cough, hemoptysis, shortness of breath or dyspnea on exertion  GASTROINTESTINAL: As noted in the History of Present Illness  GENITOURINARY: No problems with urination  Denies any hematuria or dysuria  NEUROLOGIC: No dizziness or vertigo, denies headaches  MUSCULOSKELETAL: Denies any muscle or joint pain  SKIN: Denies skin rashes or itching  ENDOCRINE: Denies excessive thirst  Denies intolerance to heat or cold  PSYCHOSOCIAL: Denies depression or anxiety  Denies any recent memory loss  Objective   Blood pressure 118/56, pulse 76, temperature (!) 97 °F (36 1 °C), temperature source Tympanic, resp  rate 16, height 5' 5" (1 651 m), weight 58 9 kg (129 lb 12 8 oz), SpO2 98 %  Body mass index is 21 6 kg/m²  PHYSICAL EXAM:   General Appearance: Alert, cooperative, no distress  HEENT: Normocephalic, atraumatic, anicteric  Neck: Supple, symmetrical, trachea midline  Lungs: Clear to auscultation bilaterally; no rales, rhonchi or wheezing; respirations unlabored   Heart: Regular rate and rhythm; no murmur, rub, or gallop  Abdomen: Soft, bowel sounds normal, non-tender, non-distended; no masses, there is no hepatosplenomegaly  No spider angiomas  Genitalia: Deferred   Rectal: Deferred   Extremities: No cyanosis, clubbing or edema   Skin: No jaundice, rashes, or lesions   Lymph nodes: No palpable cervical lymphadenopathy   Lab Results:   No visits with results within 2 Month(s) from this visit     Latest known visit with results is:   Appointment on 05/10/2022   Component Date Value    HIV-1/HIV-2 Ab 05/10/2022 Non-Reactive     WBC 05/10/2022 3 48 (A)    RBC 05/10/2022 4 65     Hemoglobin 05/10/2022 13 5     Hematocrit 05/10/2022 39 0     MCV 05/10/2022 84     MCH 05/10/2022 29 0     MCHC 05/10/2022 34 6     RDW 05/10/2022 12 2     MPV 05/10/2022 10 3     Platelets 05/10/2022 299     nRBC 05/10/2022 0     Neutrophils Relative 05/10/2022 42 (A)    Immat GRANS % 05/10/2022 0     Lymphocytes Relative 05/10/2022 41     Monocytes Relative 05/10/2022 13 (A)    Eosinophils Relative 05/10/2022 3     Basophils Relative 05/10/2022 1     Neutrophils Absolute 05/10/2022 1 46 (A)    Immature Grans Absolute 05/10/2022 0 01     Lymphocytes Absolute 05/10/2022 1 44     Monocytes Absolute 05/10/2022 0 45     Eosinophils Absolute 05/10/2022 0 09     Basophils Absolute 05/10/2022 0 03     Sodium 05/10/2022 138     Potassium 05/10/2022 3 6     Chloride 05/10/2022 105     CO2 05/10/2022 31     ANION GAP 05/10/2022 2 (A)    BUN 05/10/2022 14     Creatinine 05/10/2022 0 88     Glucose, Fasting 05/10/2022 96     Calcium 05/10/2022 9 4     AST 05/10/2022 21     ALT 05/10/2022 30     Alkaline Phosphatase 05/10/2022 53     Total Protein 05/10/2022 7 6     Albumin 05/10/2022 4 1     Total Bilirubin 05/10/2022 0 67     eGFR 05/10/2022 116     Cholesterol 05/10/2022 138     Triglycerides 05/10/2022 54     HDL, Direct 05/10/2022 56     LDL Calculated 05/10/2022 71     Non-HDL-Chol (CHOL-HDL) 05/10/2022 82     TSH 3RD GENERATON 05/10/2022 1 040     Hepatitis C Ab 05/10/2022 High Reactive (A)     Radiology Results:   No results found    Val Zuniga, DO   07/21/22   Cc:

## 2022-07-21 ENCOUNTER — LAB (OUTPATIENT)
Dept: LAB | Facility: CLINIC | Age: 27
End: 2022-07-21
Payer: COMMERCIAL

## 2022-07-21 ENCOUNTER — CONSULT (OUTPATIENT)
Dept: GASTROENTEROLOGY | Facility: CLINIC | Age: 27
End: 2022-07-21
Payer: COMMERCIAL

## 2022-07-21 VITALS
RESPIRATION RATE: 16 BRPM | SYSTOLIC BLOOD PRESSURE: 118 MMHG | DIASTOLIC BLOOD PRESSURE: 56 MMHG | HEIGHT: 65 IN | BODY MASS INDEX: 21.63 KG/M2 | OXYGEN SATURATION: 98 % | WEIGHT: 129.8 LBS | HEART RATE: 76 BPM | TEMPERATURE: 97 F

## 2022-07-21 DIAGNOSIS — B18.2 CHRONIC HEPATITIS C WITHOUT HEPATIC COMA (HCC): ICD-10-CM

## 2022-07-21 DIAGNOSIS — R76.8 HEPATITIS C ANTIBODY POSITIVE IN BLOOD: ICD-10-CM

## 2022-07-21 DIAGNOSIS — R76.8 HEPATITIS C ANTIBODY POSITIVE IN BLOOD: Primary | ICD-10-CM

## 2022-07-21 DIAGNOSIS — R11.0 NAUSEA: ICD-10-CM

## 2022-07-21 LAB
ALBUMIN SERPL BCP-MCNC: 4.3 G/DL (ref 3.5–5)
ALP SERPL-CCNC: 60 U/L (ref 46–116)
ALT SERPL W P-5'-P-CCNC: 42 U/L (ref 12–78)
ANION GAP SERPL CALCULATED.3IONS-SCNC: 4 MMOL/L (ref 4–13)
AST SERPL W P-5'-P-CCNC: 23 U/L (ref 5–45)
BILIRUB SERPL-MCNC: 0.56 MG/DL (ref 0.2–1)
BUN SERPL-MCNC: 8 MG/DL (ref 5–25)
CALCIUM SERPL-MCNC: 9.5 MG/DL (ref 8.3–10.1)
CHLORIDE SERPL-SCNC: 105 MMOL/L (ref 96–108)
CO2 SERPL-SCNC: 32 MMOL/L (ref 21–32)
CREAT SERPL-MCNC: 0.8 MG/DL (ref 0.6–1.3)
ERYTHROCYTE [DISTWIDTH] IN BLOOD BY AUTOMATED COUNT: 12.3 % (ref 11.6–15.1)
GFR SERPL CREATININE-BSD FRML MDRD: 120 ML/MIN/1.73SQ M
GLUCOSE P FAST SERPL-MCNC: 87 MG/DL (ref 65–99)
HCT VFR BLD AUTO: 39 % (ref 36.5–49.3)
HGB BLD-MCNC: 13.2 G/DL (ref 12–17)
MCH RBC QN AUTO: 29.6 PG (ref 26.8–34.3)
MCHC RBC AUTO-ENTMCNC: 33.8 G/DL (ref 31.4–37.4)
MCV RBC AUTO: 87 FL (ref 82–98)
PLATELET # BLD AUTO: 204 THOUSANDS/UL (ref 149–390)
PMV BLD AUTO: 10.8 FL (ref 8.9–12.7)
POTASSIUM SERPL-SCNC: 3.7 MMOL/L (ref 3.5–5.3)
PROT SERPL-MCNC: 8 G/DL (ref 6.4–8.4)
RBC # BLD AUTO: 4.46 MILLION/UL (ref 3.88–5.62)
SODIUM SERPL-SCNC: 141 MMOL/L (ref 135–147)
WBC # BLD AUTO: 4.48 THOUSAND/UL (ref 4.31–10.16)

## 2022-07-21 PROCEDURE — 87522 HEPATITIS C REVRS TRNSCRPJ: CPT

## 2022-07-21 PROCEDURE — 86706 HEP B SURFACE ANTIBODY: CPT

## 2022-07-21 PROCEDURE — 87902 NFCT AGT GNTYP ALYS HEP C: CPT

## 2022-07-21 PROCEDURE — 99244 OFF/OP CNSLTJ NEW/EST MOD 40: CPT | Performed by: INTERNAL MEDICINE

## 2022-07-21 PROCEDURE — 86704 HEP B CORE ANTIBODY TOTAL: CPT

## 2022-07-21 PROCEDURE — 86803 HEPATITIS C AB TEST: CPT

## 2022-07-21 PROCEDURE — 86038 ANTINUCLEAR ANTIBODIES: CPT

## 2022-07-21 PROCEDURE — 86708 HEPATITIS A ANTIBODY: CPT

## 2022-07-21 PROCEDURE — 36415 COLL VENOUS BLD VENIPUNCTURE: CPT

## 2022-07-21 PROCEDURE — 85027 COMPLETE CBC AUTOMATED: CPT

## 2022-07-21 PROCEDURE — 87340 HEPATITIS B SURFACE AG IA: CPT

## 2022-07-21 PROCEDURE — 80053 COMPREHEN METABOLIC PANEL: CPT

## 2022-07-21 NOTE — ASSESSMENT & PLAN NOTE
Shreya Barrios has hepatitis-C antibody positive blood test   He does have a history of drug use starting at around age 11 in 2008 leading to IV drug use around 2011  He also has a history of tattoos  He does not recall ever sharing needles  He does not recall hepatitis like illness  In reviewing his laboratory test his liver enzymes were abnormal back in April 2018  He was hospitalized with a drug overdose at that time  Check hepatitis C RNA quantitative test by PCR  Check hepatitis C genotype  Check ultrasound abdomen  Check ultrasound elastography  Check hepatitis A and B serologies  If he is not immune to hepatitisA for hepatitis-B he should be vaccinated for these  If his hepatitis C RNA is negative he should not need any the other above testing  Check KASHIF  Repeat CBC  Check chemistry complete  We did talk about the use of anti retroviral therapy with plan min Ace inhibitors  Treatment could include shay Contreras, treatment may span anywhere from 8-12 weeks  We also discussed ways to prevent transmission of hepatitis-C  Ways to prevent transmission of Hepatitis C:  -Avoid sharing toothbrushes and other dental equipment   -Avoid sharing razors  -Do not donate blood  -If using Illicit drugs you should stop using and enter into substance abuse treatment program  -If bleeding make sure others are not exposed to your blood

## 2022-07-21 NOTE — PATIENT INSTRUCTIONS
Hepatitis C antibody positive in blood  Dori Knowles has hepatitis-C antibody positive blood test   He does have a history of drug use starting at around age 11 in 2008 leading to IV drug use around 2011  He also has a history of tattoos  He does not recall ever sharing needles  He does not recall hepatitis like illness  In reviewing his laboratory test his liver enzymes were abnormal back in April 2018  He was hospitalized with a drug overdose at that time  Check hepatitis C RNA quantitative test by PCR  Check hepatitis C genotype  Check ultrasound abdomen  Check ultrasound elastography  Check hepatitis A and B serologies  If he is not immune to hepatitisA for hepatitis-B he should be vaccinated for these  If his hepatitis C RNA is negative he should not need any the other above testing  Check KASHIF  Repeat CBC  Check chemistry complete  We did talk about the use of anti retroviral therapy with plan min Ace inhibitors  Treatment could include shay Chin, treatment may span anywhere from 8-12 weeks  We also discussed ways to prevent transmission of hepatitis-C  Ways to prevent transmission of Hepatitis C:  -Avoid sharing toothbrushes and other dental equipment   -Avoid sharing razors  -Do not donate blood  -If using Illicit drugs you should stop using and enter into substance abuse treatment program  -If bleeding make sure others are not exposed to your blood  Nausea  Does report intermittent nausea maybe a couple times a week  At times it is worse with eating  He has not had any significant weight loss  Denies any heartburn indigestion  I suspect his nausea may be more related to his Subutex  He will be following up with his addiction specialist in the near future and talk about decreasing the dose of this  If his symptoms persist then we can consider further evaluation possibly with upper endoscopy    He could consider starting Pepcid 20 mg once a day or twice a day to see if this helps with his nausea

## 2022-07-21 NOTE — ASSESSMENT & PLAN NOTE
Does report intermittent nausea maybe a couple times a week  At times it is worse with eating  He has not had any significant weight loss  Denies any heartburn indigestion  I suspect his nausea may be more related to his Subutex  He will be following up with his addiction specialist in the near future and talk about decreasing the dose of this  If his symptoms persist then we can consider further evaluation possibly with upper endoscopy  He could consider starting Pepcid 20 mg once a day or twice a day to see if this helps with his nausea

## 2022-07-22 LAB
HAV AB SER QL IA: REACTIVE
HBV CORE AB SER QL: NORMAL
HBV SURFACE AB SER-ACNC: >1000 MIU/ML
HBV SURFACE AG SER QL: NORMAL
HCV AB SER QL: ABNORMAL
RYE IGE QN: NEGATIVE

## 2022-07-22 NOTE — RESULT ENCOUNTER NOTE
Please inform patient that he is immune to hepatitis a, looks like the hepatitis-C antibody was positive 4 years ago as well  He is immune to hepatitis B  his liver enzymes remain normal   CBC is normal   Hepatitis-C RNA is pending    Thank you

## 2022-07-23 LAB
HCV RNA SERPL NAA+PROBE-ACNC: NORMAL IU/ML
HCV RNA SERPL NAA+PROBE-LOG IU: 5.43 LOG10 IU/ML
TEST INFORMATION: NORMAL

## 2022-07-25 NOTE — RESULT ENCOUNTER NOTE
Please inform patient hepatitis C RNA quantitative test by PCR is positive but at a low level at 271,000  Hepatitis-C genotype is pending    Thank you

## 2022-07-26 LAB
HCV GENTYP SERPL NAA+PROBE: NORMAL
HCV PLEASE NOTE: NORMAL

## 2022-07-28 ENCOUNTER — OFFICE VISIT (OUTPATIENT)
Dept: SLEEP CENTER | Facility: CLINIC | Age: 27
End: 2022-07-28
Payer: COMMERCIAL

## 2022-07-28 VITALS
BODY MASS INDEX: 21.66 KG/M2 | HEIGHT: 65 IN | SYSTOLIC BLOOD PRESSURE: 118 MMHG | WEIGHT: 130 LBS | DIASTOLIC BLOOD PRESSURE: 82 MMHG

## 2022-07-28 DIAGNOSIS — F51.01 PRIMARY INSOMNIA: ICD-10-CM

## 2022-07-28 DIAGNOSIS — F51.04 CHRONIC INSOMNIA: Primary | ICD-10-CM

## 2022-07-28 PROCEDURE — 99204 OFFICE O/P NEW MOD 45 MIN: CPT | Performed by: PSYCHIATRY & NEUROLOGY

## 2022-07-28 NOTE — PROGRESS NOTES
Review of Systems      Genitourinary none   Cardiology none   Gastrointestinal none   Neurology frequent headaches, awaken with headache, forgetfulness and poor concentration or confusion,    Constitutional none   Integumentary none   Psychiatry anxiety   Musculoskeletal none   Pulmonary none   ENT none   Endocrine none   Hematological none

## 2022-07-28 NOTE — PROGRESS NOTES
Sleep Consultation   Mary Rosario 32 y o  male MRN: [id number]      Reason for consultation:  Insomnia    Requesting physician: Jo Calle, DO    Assessment/Plan    18-year-old male past medical history migraines, heroin abuse, anxiety/depression, newly diagnosed hepatitis-C presents for evaluation of chronic insomnia    1  Chronic insomnia  -Tried cognitive behavioral therapy for insomnia when he was 11  -Tried a multitude of medications for insomnia including trazodone, Seroquel, ramelteon, Remeron, doxepin, Doxilamine     -He was on Ambien 10mg for 5 years which helped     -He stopped using it 3-4 months ago when his primary care provider who was prescribing it recommended he see sleep medicine     -Sleep history as in HPI    Plan  -Referral to cognitive behavioral therapy for insomnia given  -Patient was counseled on sleep hygiene including getting out of bed when he is not able to sleep, reading a book when he is unable to sleep   -Follow-up appointment if patient is unable to schedule CBTI  -Follow-up appointment if patient does not improve after CBTI  -Will consider trying medications if no improvement    History of Present Illness   HPI:  Mary Rosario is a 18-year-old male past medical history migraines, heroin abuse, anxiety/depression, newly diagnosed hepatitis-C presents for evaluation of chronic insomnia  Patient has had difficulty falling asleep since the age of 11  He generally goes to bed at 11:00 p m  and cannot fall asleep until 3 or 4:00 a m  He wakes up at 8:00 a m  to go to work  He has 2-3 nighttime awakenings and sometimes cannot fall back asleep for an hour  His total sleep time is 4:00 a m  at the most per night  He goes to work from 9:30 a m  to 8:30 p m  He drinks 1 cup of coffee in the morning  He has daytime sleepiness with an [place] score of 8  He states he feels tired all day  He has decreased memory and concentration    He wakes up daily with morning headaches  He states he gets aggravated when he goes to bed and is unable to sleep for 3-4 hours  He generally continues to lay in bed when he is unable to sleep and sometimes watches TV  He tried cognitive behavioral therapy for insomnia when he was 13  He has tried a multitude of medications for insomnia including trazodone, Seroquel, ramelteon, Remeron, doxepin, Doxilamine  He was on Ambien 10mg for 5 years which helped  He stopped using it 3-4 months ago when his primary care provider who was prescribing it recommended he see sleep medicine  He previously used heroin among other drugs but has not used in the past 5 years  Sleep history:  Goes to bed at 11:00 p m  and cannot fall asleep until 3:24 a m  He has 2-3 nighttime awakenings and cannot fall asleep for sometimes an hour  He does not take naps during the day  Denies restless leg symptoms which only occurred when he was on Remeron 10 years ago  Denies sleep paralysis, cataplexy, hallucinations              Review of Systems      Genitourinary none   Cardiology none   Gastrointestinal none   Neurology frequent headaches, awaken with headache, forgetfulness, poor concentration or confusion,  and difficulty with memory   Constitutional none   Integumentary none   Psychiatry anxiety   Musculoskeletal none   Pulmonary none   ENT none   Endocrine none   Hematological none               Historical Information   Past Medical History:   Diagnosis Date    [facility]     MAURICE (generalized anxiety disorder) 10/10/2018    [facility] ) 10/10/2018    Manic behavior ([place] [address] )      Past Surgical History:   Procedure Laterality Date    MANDIBLE FRACTURE SURGERY      WISDOM TOOTH EXTRACTION       Family History   Problem Relation Age of Onset    Seizures Mother     Multiple sclerosis Mother     Hepatitis Father      Social History     Socioeconomic History    Marital status: Single     Spouse name: Not on file    Number of children: Not on file    Years of education: Not on file    Highest education level: Not on file   Occupational History    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   Tobacco Use    Smoking status: Former Smoker     Packs/day: 0 50     Years: 6 00     Pack years: 3 00    Smokeless tobacco: Former User     Types: Chew    Tobacco comment: Vapes- current user 4/18/22   Substance and Sexual Activity    Alcohol use: No    Drug use: Yes     Types: Amphetamines, Cocaine, Hydrocodone, Heroin, Marijuana, Oxycodone     Comment: Hasn't used since 9/2/2018    Sexual activity: Yes     Partners: Female   Other Topics Concern    Not on file   Social History Narrative    Single    No children    Lives with his mother  Works as   Social Determinants of Health     Financial Resource Strain: Not on file   Food Insecurity: Not on file   Transportation Needs: Not on file   Physical Activity: Not on file   Stress: Not on file   Social Connections: Not on file   Intimate Partner Violence: Not on file   Housing Stability: Not on file       Occupational History:  Manager at [employer]    Meds/Allergies   Allergies   Allergen Reactions    Topamax [Topiramate] Angioedema       Home medications:  Prior to Admission medications    Medication Sig Start Date End Date Taking? Authorizing Provider   /buprenorphine (SUBUTEX) 8 mg dissolve 1 tablet under the tongue three times a day DO NOT chew or swallow 5/5/22  Yes Historical Provider, MD   cloNIDine (CATAPRES) 0 1 mg tablet Take 1 tablet (0 1 mg total) by mouth 3 (three) times a day 4/21/21  Yes Nakita Celestin DO       Vitals:   Blood pressure 118/82, height 5' 5" (1 651 m), weight 59 kg (130 lb)  ,  Body mass index is 21 63 kg/m²         Physical Exam  General:  Awake alert and oriented x 3, conversant without conversational dyspnea, NAD, normal affect  HEENT:  PERRL, Sclera noninjected, nonicteric OU, Nares patent,  no craniofacial abnormalities, Mucous membranes, moist, no oral lesions, normal dentition, Mallampati class 1  NECK: Trachea midline, no accessory muscle use, no stridor, no cervical or supraclavicular adenopathy, JVP not elevated  CARDIAC: Reg, single s1/S2, no m/r/g  PULM: CTA bilaterally no wheezing, rhonchi or rales  ABD: Normoactive bowel sounds, soft nontender, nondistended, no rebound, no rigidity, no guarding  EXT: No cyanosis, no clubbing, no edema, normal capillary refill  NEURO: no focal neurologic deficits, AAOx3, moving all extremities appropriately    Labs: I have personally reviewed pertinent lab results    Lab Results   Component Value Date    WBC 4 48 07/21/2022    HGB 13 2 07/21/2022    HCT 39 0 07/21/2022    MCV 87 07/21/2022     07/21/2022      Lab Results   Component Value Date    CALCIUM 9 5 07/21/2022     04/29/2018    K 3 7 07/21/2022    CO2 32 07/21/2022     07/21/2022    BUN 8 07/21/2022    CREATININE 0 80 07/21/2022     No results found for: IRON, TIBC, FERRITIN  No results found for: [id number]  No results found for: MD Deandra Briones's Sleep Fellow

## 2022-07-28 NOTE — RESULT ENCOUNTER NOTE
Please inform patient that his genotype for hepatitis C is back  Rupert Donato will be in touch with him regarding getting treatment set up  Await US elastography

## 2022-07-29 ENCOUNTER — TELEPHONE (OUTPATIENT)
Dept: GASTROENTEROLOGY | Facility: CLINIC | Age: 27
End: 2022-07-29

## 2022-07-29 NOTE — TELEPHONE ENCOUNTER
Message from Hepatitis Genotype result relayed to pt  Informed someone from hepatology would be reaching out to him   US elastograpgy is scheduled for 8/23/22 @ 7405

## 2022-08-02 ENCOUNTER — TELEPHONE (OUTPATIENT)
Dept: GASTROENTEROLOGY | Facility: CLINIC | Age: 27
End: 2022-08-02

## 2022-08-02 DIAGNOSIS — B18.2 CHRONIC HEPATITIS C WITHOUT HEPATIC COMA (HCC): Primary | ICD-10-CM

## 2022-08-02 NOTE — TELEPHONE ENCOUNTER
[facility],    7/28/2022  5:17 PM EDT  Please inform patient that his genotype for hepatitis C is back  Memory Rafael will be in touch with him regarding getting treatment set up  Await US elastography  Discussed hep c treatment and medication authorization process with patient  Elastography scheduled 8/23/22  He is aware authorization will be initiated when elastography reading is available  Patient thanked me for updating him and had no additional questions

## 2022-08-23 ENCOUNTER — HOSPITAL ENCOUNTER (OUTPATIENT)
Dept: ULTRASOUND IMAGING | Facility: HOSPITAL | Age: 27
Discharge: HOME | End: 2022-08-23
Attending: INTERNAL MEDICINE
Payer: COMMERCIAL

## 2022-08-23 DIAGNOSIS — R76.8 HEPATITIS C ANTIBODY POSITIVE IN BLOOD: ICD-10-CM

## 2022-08-23 PROCEDURE — 76700 US EXAM ABDOM COMPLETE: CPT

## 2022-08-23 PROCEDURE — 76981 USE PARENCHYMA: CPT

## 2022-08-24 NOTE — RESULT ENCOUNTER NOTE
Please inform patient  that ultrasound revealed the liver to be of normal size  There is some mild fatty infiltration liver  Spleen is normal   I see that the elastography was completed, results are not yet back  Await ultrasound elastography results  I did copy [facility] as an Si Curiel    Thank

## 2022-08-26 NOTE — RESULT ENCOUNTER NOTE
Please inform patient that ultrasound elastography revealed absent to mild scarring of the liver  He should be hearing from Jair Santos regarding setting up treatment for his hepatitis-C    Thank you

## 2022-08-29 NOTE — TELEPHONE ENCOUNTER
Patient is aware authorization process for Roni Edi is initiated  Discussed hep c treatment and medication authorization process  Reviewed test results and provider recommendations

## 2022-08-29 NOTE — TELEPHONE ENCOUNTER
Mavyret 8 week script entered as per pathway recommendation    Treatment  [facility]  Viral load [id number]  Genotype  2b  Elastography  F0- F1    HBV surf ab  > 1000 00

## 2022-09-06 RX ORDER — VELPATASVIR AND SOFOSBUVIR 100; 400 MG/1; MG/1
1 TABLET, FILM COATED ORAL DAILY
Qty: 28 TABLET | Refills: 2 | Status: SHIPPED | OUTPATIENT
Start: 2022-09-06 | End: 2022-11-29

## 2022-09-06 NOTE — TELEPHONE ENCOUNTER
Received e mail from [address] [place] [address] denied  Epclusa brand is formulary with insurance carrier  Epclusa 12 week script entered

## 2022-09-15 ENCOUNTER — HOSPITAL ENCOUNTER (EMERGENCY)
Facility: HOSPITAL | Age: 27
Discharge: HOME | End: 2022-09-15
Attending: EMERGENCY MEDICINE
Payer: COMMERCIAL

## 2022-09-15 ENCOUNTER — OFFICE VISIT (OUTPATIENT)
Dept: URGENT CARE | Facility: CLINIC | Age: 27
End: 2022-09-15
Payer: COMMERCIAL

## 2022-09-15 VITALS
BODY MASS INDEX: 21.19 KG/M2 | HEIGHT: 67 IN | RESPIRATION RATE: 18 BRPM | WEIGHT: 135 LBS | HEART RATE: 69 BPM | OXYGEN SATURATION: 100 % | TEMPERATURE: 98.2 F

## 2022-09-15 VITALS
TEMPERATURE: 97.8 F | DIASTOLIC BLOOD PRESSURE: 86 MMHG | HEART RATE: 70 BPM | RESPIRATION RATE: 18 BRPM | SYSTOLIC BLOOD PRESSURE: 154 MMHG | OXYGEN SATURATION: 100 %

## 2022-09-15 DIAGNOSIS — R04.0 NOSEBLEED: Primary | ICD-10-CM

## 2022-09-15 DIAGNOSIS — R04.0 LEFT-SIDED EPISTAXIS: Primary | ICD-10-CM

## 2022-09-15 PROCEDURE — G0382 LEV 3 HOSP TYPE B ED VISIT: HCPCS

## 2022-09-15 PROCEDURE — 99282 EMERGENCY DEPT VISIT SF MDM: CPT | Performed by: EMERGENCY MEDICINE

## 2022-09-15 PROCEDURE — 99283 EMERGENCY DEPT VISIT LOW MDM: CPT

## 2022-09-15 RX ORDER — OXYMETAZOLINE HYDROCHLORIDE 0.05 G/100ML
2 SPRAY NASAL ONCE
Status: COMPLETED | OUTPATIENT
Start: 2022-09-15 | End: 2022-09-15

## 2022-09-15 RX ADMIN — OXYMETAZOLINE HYDROCHLORIDE 2 SPRAY: 0.05 SPRAY NASAL at 21:30

## 2022-09-16 NOTE — ED PROVIDER NOTES
History  Chief Complaint   Patient presents with   [id number] [facility]     Patient reports he was at urgent care [facility] and was sent here for possible posterior nose bleed  Patient reports symptoms for 2 hours  Patient denies any difficulty swallowing or breathing  18-year-old male presents emergency room had the request of urgent care  The patient notes that yesterday he had a blood booger" in his left nare  Patient notes that late yesterday and today though he noticed some blood trickling down the back of his throat but no further bleeding from his nose anteriorly  The patient went to the urgent care any appears to not be bleeding anymore  He denies any trauma or drug use  He denies any blood clotting disorder or any blood thinner use  The patient arrives in the emergency room with no active bleeding at this time  Prior to Admission Medications   Prescriptions Last Dose Informant Patient Reported? Taking? Glecaprevir-Pibrentasvir 100-40 MG TABS   No No   Sig: Take 3 tablets by mouth in the morning   Sofosbuvir-Velpatasvir (Epclusa) 400-100 MG tablet   No No   Sig: Take 1 tablet by mouth daily   buprenorphine (SUBUTEX) 8 mg   Yes No   Sig: dissolve 1 tablet under the tongue three times a day DO NOT chew or swallow   cloNIDine (CATAPRES) 0 1 mg tablet   No No   Sig: Take 1 tablet (0 1 mg total) by mouth 3 (three) times a day      Facility-Administered Medications: None       Past Medical History:   Diagnosis Date    Bipolar 2 disorder ([facility] )     MAURICE (generalized anxiety disorder) 10/10/2018    Heroin abuse ([facility] ) 10/10/2018    Manic behavior ([place] [id number] )        Past Surgical History:   Procedure Laterality Date    MANDIBLE FRACTURE SURGERY      WISDOM TOOTH EXTRACTION         Family History   Problem Relation Age of Onset    Seizures Mother     Multiple sclerosis Mother     Hepatitis Father      I have reviewed and agree with the history as documented      E-Cigarette/Vaping    E-Cigarette Use Current Every Day User      E-Cigarette/Vaping Substances     Social History     Tobacco Use    Smoking status: Former Smoker     Packs/day: 0 50     Years: 6 00     Pack years: 3 00    Smokeless tobacco: Former User     Types: Chew    Tobacco comment: Vapes- current user 4/18/22   Vaping Use    Vaping Use: Every day   Substance Use Topics    Alcohol use: No    Drug use: Not Currently     Types: Amphetamines, Cocaine, Hydrocodone, Heroin, Marijuana, Oxycodone     Comment: Hasn't used since 9/2/2018       Review of Systems   Constitutional: Negative for chills and fever  HENT: Positive for nosebleeds  Negative for ear pain and sore throat  Eyes: Negative for pain and visual disturbance  Respiratory: Negative for cough and shortness of breath  Cardiovascular: Negative for chest pain and palpitations  Gastrointestinal: Negative for abdominal pain and vomiting  Genitourinary: Negative for dysuria and hematuria  Musculoskeletal: Negative for arthralgias and back pain  Skin: Negative for color change and rash  Neurological: Negative for seizures and syncope  All other systems reviewed and are negative  Physical Exam  Physical Exam  Constitutional:       General: He is not in acute distress  Appearance: Normal appearance  He is normal weight  He is not ill-appearing  HENT:      Head: Normocephalic and atraumatic  Right Ear: External ear normal       Left Ear: External ear normal       Nose: Nose normal       Comments: No evidence of active bleeding at this time  There is a small amount of blood noted on the floor the left near anteriorly  There is no evidence of locale of bleeding from the nasal septum  The posterior oropharynx shows some dried blood but no active bleeding at this time  Mouth/Throat:      Mouth: Mucous membranes are moist    Eyes:      Conjunctiva/sclera: Conjunctivae normal    Cardiovascular:      Rate and Rhythm: Normal rate and regular rhythm  Pulses: Normal pulses  Heart sounds: Normal heart sounds  Pulmonary:      Effort: Pulmonary effort is normal       Breath sounds: Normal breath sounds  Abdominal:      General: Abdomen is flat  There is no distension  Palpations: Abdomen is soft  There is no mass  Musculoskeletal:         General: No swelling, tenderness or deformity  Normal range of motion  Cervical back: Normal range of motion  Skin:     General: Skin is warm and dry  Capillary Refill: Capillary refill takes 2 to 3 seconds  Coloration: Skin is not pale  Neurological:      General: No focal deficit present  Mental Status: He is alert and oriented to person, place, and time  Mental status is at baseline  Psychiatric:         Mood and Affect: Mood normal          Vital Signs  ED Triage Vitals [09/15/22 2040]   Temperature Pulse Respirations Blood Pressure SpO2   97 8 °F (36 6 °C) 70 18 154/86 100 %      Temp Source Heart Rate Source Patient Position - Orthostatic VS BP Location FiO2 (%)   Tympanic -- Sitting Left arm --      Pain Score       No Pain           Vitals:    09/15/22 2040   BP: 154/86   Pulse: 70   Patient Position - Orthostatic VS: Sitting         Visual Acuity      ED Medications  Medications   oxymetazoline (AFRIN) 0 05 % nasal spray 2 spray (2 sprays Left Nare Given 9/15/22 2130)       Diagnostic Studies  Results Reviewed     None                 No orders to display              Procedures  Procedures         ED Course  ED Course as of 09/15/22 2141   Thu Sep 15, 2022   2101 Discussed case with Dr Domi Maldonado, who notes nothing to do acutely and will call him tomorrow to have him seen in the office  SBIRT 20yo+    Flowsheet Row Most Recent Value   SBIRT (23 yo +)    In order to provide better care to our patients, we are screening all of our patients for alcohol and drug use  Would it be okay to ask you these screening questions?  Yes Filed at: 09/15/2022 2042 Initial Alcohol Screen: US AUDIT-C     1  How often do you have a drink containing alcohol? 0 Filed at: 09/15/2022 2042   2  How many drinks containing alcohol do you have on a typical day you are drinking? 0 Filed at: 09/15/2022 2042   3a  Male UNDER 65: How often do you have five or more drinks on one occasion? 0 Filed at: 09/15/2022 2042   3b  FEMALE Any Age, or MALE 65+: How often do you have 4 or more drinks on one occassion? 0 Filed at: 09/15/2022 2042   Audit-C Score 0 Filed at: 09/15/2022 2042   XENIA: How many times in the past year have you    Used an illegal drug or used a prescription medication for non-medical reasons? Never Filed at: 09/15/2022 2042                    MDM    Disposition  Final diagnoses:   Left-sided epistaxis     Time reflects when diagnosis was documented in both MDM as applicable and the Disposition within this note     Time User Action Codes Description Comment    9/15/2022  9:20 PM Shabbir Sow Add [R04 0] Left-sided epistaxis       ED Disposition     ED Disposition   Discharge    Condition   Stable    Date/Time   Thu Sep 15, 2022  9:21 PM    Comment   Gloria Edwards discharge to home/self care                 Follow-up Information     Follow up With Specialties Details Why Angelito Garcia 31, DO Otolaryngology On 9/16/2022  [address]  [address] [place] [address]  [phone]            Discharge Medication List as of 9/15/2022  9:24 PM      CONTINUE these medications which have NOT CHANGED    Details   buprenorphine (SUBUTEX) 8 mg dissolve 1 tablet under the tongue three times a day DO NOT chew or swallow, Historical Med      cloNIDine (CATAPRES) 0 1 mg tablet Take 1 tablet (0 1 mg total) by mouth 3 (three) times a day, Starting Wed 4/21/2021, Normal      Glecaprevir-Pibrentasvir 100-40 MG TABS Take 3 tablets by mouth in the morning, Starting Tue 8/30/2022, Until Tue 10/25/2022, Normal      Sofosbuvir-Velpatasvir (Epclusa) 400-100 MG tablet Take 1 tablet by mouth daily, Starting Tue 9/6/2022, Until Tue 11/29/2022, Normal             No discharge procedures on file      PDMP Review       Value Time User    PDMP Reviewed  Yes 1/14/2022  8:31 AM Ivone Jerome DO          ED Provider  Electronically Signed by           Ning Ness DO  09/15/22 8355

## 2022-09-16 NOTE — PROGRESS NOTES
[address] [facility]        NAME: Benji Abraham is a 32 y o  male  : 1995    MRN: [id number]  DATE: September 15, 2022  TIME: 8:29 PM    Assessment and Plan   Nosebleed [R04 0]  1  Nosebleed  Transfer to other facility     No anterior bleeding visualized on exam of nasal passages  Bloody drainage down posterior pharynx  Concern for possible posterior nosebleed  Recommend ER evaluation  Patient Instructions     Patient Instructions   Proceed to ER for further evaluation  Follow up with PCP in 3-5 days  Proceed to  ER if symptoms worsen  Chief Complaint     Chief Complaint   Patient presents with    Dental Pain     Was had some blood in mouth was vomiting 2 days ago         History of Present Illness       HPI  Benji Abraham is a 32 y o  male who presents today for evaluation of blood running down the back of his throat  Symptoms started this evening  Denies any bleeding from his nostrils  Patient describes as a slow trickle of blood down the back of his throat  Patient denies any lightheadedness, dizziness, weakness, cough, or shortness of breath  Patient denies history of clotting disorder  Patient does not take any blood thinners  He does report 2 days ago he had episodes of vomiting  Review of Systems   Review of Systems   Constitutional: Negative for chills and fever  HENT: Positive for nosebleeds  Respiratory: Negative for cough and shortness of breath  Cardiovascular: Negative for chest pain and palpitations  Musculoskeletal: Negative  Skin: Negative for color change and rash  Neurological: Negative for dizziness, weakness and light-headedness           Current Medications       Current Outpatient Medications:     buprenorphine (SUBUTEX) 8 mg, dissolve 1 tablet under the tongue three times a day DO NOT chew or swallow, Disp: , Rfl:     cloNIDine (CATAPRES) 0 1 mg tablet, Take 1 tablet (0 1 mg total) by mouth 3 (three) times a day, Disp: 90 tablet, Rfl: 5    Glecaprevir-Pibrentasvir 100-40 MG TABS, Take 3 tablets by mouth in the morning, Disp: 84 tablet, Rfl: 1    Sofosbuvir-Velpatasvir (Epclusa) 400-100 MG tablet, Take 1 tablet by mouth daily, Disp: 28 tablet, Rfl: 2    Current Allergies     Allergies as of 09/15/2022 - Reviewed 07/28/2022   Allergen Reaction Noted    Topamax [topiramate] Angioedema 06/05/2017            The following portions of the patient's history were reviewed and updated as appropriate: allergies, current medications, past family history, past medical history, past social history, past surgical history and problem list      Past Medical History:   Diagnosis Date    [facility] )     MAURICE (generalized anxiety disorder) 10/10/2018    Heroin abuse ([facility] ) 10/10/2018    Manic behavior ([facility] )        Past Surgical History:   Procedure Laterality Date    MANDIBLE FRACTURE SURGERY      WISDOM TOOTH EXTRACTION         Family History   Problem Relation Age of Onset    Seizures Mother     Multiple sclerosis Mother     Hepatitis Father          Medications have been verified  Objective   Pulse 69   Temp 98 2 °F (36 8 °C)   Resp 18   Ht 5' 7" (1 702 m)   Wt 61 2 kg (135 lb)   SpO2 100%   BMI 21 14 kg/m²        Physical Exam     Physical Exam  Vitals and nursing note reviewed  Constitutional:       General: He is not in acute distress  Appearance: Normal appearance  He is well-developed  HENT:      Head: Normocephalic and atraumatic  Right Ear: Tympanic membrane and ear canal normal       Left Ear: Tympanic membrane and ear canal normal       Nose:      Comments: No bleeding visualized in b/l nasal mucosa  Mouth/Throat:     Cardiovascular:      Rate and Rhythm: Normal rate and regular rhythm  Heart sounds: Normal heart sounds, S1 normal and S2 normal    Pulmonary:      Effort: Pulmonary effort is normal       Breath sounds: Normal breath sounds  Skin:     General: Skin is warm and dry  Capillary Refill: Capillary refill takes less than 2 seconds  Psychiatric:         Behavior: Behavior is cooperative

## 2022-09-16 NOTE — DISCHARGE INSTRUCTIONS
Return to the ER if bleeding occurs and does not stop  Otherwise please contact the ENT doctor on your discharge instructions to get an appointment in the next 48 hours  You may use Afrin spray as indicated on the package to try to reduce recurrence of bleeding

## 2022-09-26 NOTE — TELEPHONE ENCOUNTER
Spoke to Marylin Weston- [facility] specialty pharmacist and provided verbal order for epclusa

## 2022-09-26 NOTE — TELEPHONE ENCOUNTER
Patient is aware Gisell Crenshaw is approved and [facility] will contact to schedule delivery to his home  Education initiated -dose, missed dose, side effects and required blood work  He will contact me with start date

## 2022-09-29 NOTE — TELEPHONE ENCOUNTER
Spoke to [place] [facility] to follow up on [facility] delivery  They will contact patient to schedule

## 2022-10-03 NOTE — TELEPHONE ENCOUNTER
Education initiated -dose, missed dose, side effects and required blood work  Patient did not receive Epclusa delivery from [facility]  Contacted [facility] to follow up on Epclusa delivery  I spoke to [facility] who stated a PA was needed  I made her aware Shahnaz Soliman was authorized on 9/23/22  She transferred me and disconnected the call  I called back and spoke to Christ spear who transferred me back to [facility]  She stated that epclusa was denied for a missing MISBAH code but was unsure what the code meant  She connected me to pharmacist Kaley and confirmed Shahnaz Soliman approved  Patient will be contacted  ** I contacted the patient and made aware  Provided [facility] contact information

## 2022-10-06 NOTE — TELEPHONE ENCOUNTER
Contacted patient to confirm Epclusa delivery  Patient did not receive Ep[facility] from [facility]  Spoke to [facility] and [facility] regarding Epclusa delivery -approved with $200  Patient has not been contacted to schedule delivery or offered co pay assistance  I requested to speak with a supervisor regarding handling of authorization  I spoke to [facility] supervisor and discussed handling of Epclusa delivery -patient not contacted/ co pay assistance not offered  She assured me that the patient will be contacted today with co pay assistance and schedule epclusa delivery

## 2022-10-07 NOTE — TELEPHONE ENCOUNTER
Left message on voice mail- following up on Epclusa delivery  Will contact you on Monday to confirm if [facility] scheduled delivery

## 2022-10-10 NOTE — TELEPHONE ENCOUNTER
Spoke to [facility] specialty and confirmed Jonathan Garcia is scheduled for delivery on 10/11/22

## 2022-10-16 NOTE — PROGRESS NOTES
[facility] Gastroenterology  Gastroenterology Outpatient Follow up  Patient Maximus Hernandez   Age 32 y o  Gender male   MRN: [id number]  [facility] [id number]     ASSESSMENT AND PLAN:   Problem List Items Addressed This Visit        Other    Hepatitis C antibody positive in blood - Primary     Pat has chronic hepatitis-C  Genotype 2b  Treatment Epclusa  Started treatment 10/9  Duration of treatment will be 12 weeks  Essie Severs is immune to both hepatitis A and hepatitis-B  Check hepatitis C RNA and liver profile and CBC at week 4  Patient is tolerating his treatment  I did remind him of the need to be very compliant with medication and try not to miss any doses  He is taking medication at the same time every day as well  Ways to prevent transmission of Hepatitis C:  -Avoid sharing toothbrushes and other dental equipment   -Avoid sharing razors  -Do not donate blood  -If using Illicit drugs you should stop using and enter into substance abuse treatment program  -If bleeding make sure others are not exposed to your blood  Relevant Orders    Comprehensive metabolic panel    CBC    Hepatitis C RNA, quantitative, PCR         _____________________________________________________________    HPI:   Essie Severs is a 42-year-old gentleman who I am seeing in follow-up for hepatitis-C  He was noted to have genotype 2b  He had a very low viral load  His ultrasound elastography revealed F0 to F1, absent or mild fibrosis  He has been abstaining from any form of recreational drug use  He does not drink alcohol  He started [place] on October 9th  He is tolerating this medication quite well  Denies any abdominal pain nausea vomiting or early satiety or unintentional weight loss  There has been no diarrhea constipation or abdominal pain  Records reviewed for 10 minutes prior to office visit      08/23/2022 ultrasound abdomen   Liver normal 14 7 cm      Mild increased echogenicity consistent with mild hepatic steatosis  Spleen normal    07/21/2022 laboratory data  Chemistry complete normal   AST 23   ALT 42   WBC 4 48 HGB 13 2 HCT 39 MCV 87 platelet count [id number]   Hepatitis a antibody total reactive  Hepatitis B surface antigen nonreactive  Hepatitis B surface antibody greater than 1000   Hepatitis C core antibody total negative   Hepatitis C antibody high reactive   Hepatitis C genotype 2b   Hepatitis C RNA 5 43 log 10 International Units/mL  Hepatitis C RNA quantitative test, 835388 copies per mL      08/03/2022 ultrasound elastography  F0 to F1, absent or mild fibrosis  The resulting E median is 6 1 kPa  The corresponding Metavir score is F0-F1(absent or mild fibrosis), range 0-8 26kPa  <1 66 m/s  The IQR/median value is 9 7 %  (IQR of less than 30% is considered a satisfactory data set)  05/10/2022 laboratory data  Sodium 138  Creatinine 0 88  AST 21  ALT 30  T bili 0 67  WBC 3 48 HGB 13 5 HCT 39 platelet count [id number]  Hepatitis C antibody highly reactive  HIV negative        01/03/2022 SARs-CoV 2 positive     06/06/2017 CTA chest  Negative for pulmonary emboli visualized portions of the upper abdomen normal     Allergies   Allergen Reactions   • Topamax [Topiramate] Angioedema     Current Outpatient Medications   Medication Sig Dispense Refill   • buprenorphine (SUBUTEX) 8 mg dissolve 1 tablet under the tongue three times a day DO NOT chew or swallow     • cloNIDine (CATAPRES) 0 1 mg tablet Take 1 tablet (0 1 mg total) by mouth 3 (three) times a day 90 tablet 5   • Sofosbuvir-Velpatasvir (Epclusa) 400-100 MG tablet Take 1 tablet by mouth daily 28 tablet 2     No current facility-administered medications for this visit       MEDICAL HISTORY:  Past Medical History:   Diagnosis Date   • [facility] ([facility] )    • MAURICE (generalized anxiety disorder) 10/10/2018   • Heroin abuse ([facility] ) 10/10/2018   • Manic behavior ([facility] )      Past Surgical History:   Procedure Laterality Date   • MANDIBLE FRACTURE SURGERY • WISDOM TOOTH EXTRACTION       Social History     Substance and Sexual Activity   Alcohol Use No     Social History     Substance and Sexual Activity   Drug Use Not Currently   • Types: Amphetamines, Cocaine, Hydrocodone, Heroin, Marijuana, Oxycodone    Comment: Hasn't used since 9/2/2018     Social History     Tobacco Use   Smoking Status Former Smoker   • Packs/day: 0 50   • Years: 6 00   • Pack years: 3 00   Smokeless Tobacco Former User   • Types: Chew   Tobacco Comment    Vapes- current user 4/18/22     Family History   Problem Relation Age of Onset   • Seizures Mother    • Multiple sclerosis Mother    • Hepatitis Father        Objective   Blood pressure 104/82, pulse 71, temperature 98 2 °F (36 8 °C), temperature source Tympanic, resp  rate 18, height 5' 7" (1 702 m), weight 59 4 kg (131 lb), SpO2 98 %  Body mass index is 20 52 kg/m²  PHYSICAL EXAM:   General Appearance: Alert, cooperative, no distress  HEENT: Normocephalic, atraumatic, anicteric  Neck: Supple, symmetrical, trachea midline  Lungs: Clear to auscultation bilaterally; no rales, rhonchi or wheezing; respirations unlabored   Heart: Regular rate and rhythm; no murmur, rub, or gallop  Abdomen: Soft, bowel sounds normal, non-tender, non-distended; no masses, there is no hepatosplenomegaly  No spider angiomas  Genitalia: Deferred   Rectal: Deferred   Extremities: No cyanosis, clubbing or edema   Skin: No jaundice, rashes, or lesions   Lymph nodes: No palpable cervical lymphadenopathy   Lab Results:   No visits with results within 2 Month(s) from this visit     Latest known visit with results is:   Lab on 07/21/2022   Component Date Value   • Hep B Core Total Ab 07/21/2022 Non-reactive    • Hep A Total Ab 07/21/2022 Reactive (A)   • Hep B S Ab 07/21/2022 >1,000 00    • Hepatitis B Surface Ag 07/21/2022 Non-reactive    • Hepatitis C Ab 07/21/2022 High Reactive (A)   • KASHIF 07/21/2022 Negative    • WBC 07/21/2022 4 48    • RBC 07/21/2022 4 46    • Hemoglobin 07/21/2022 13 2    • Hematocrit 07/21/2022 39 0    • MCV 07/21/2022 87    • MCH 07/21/2022 29 6    • MCHC 07/21/2022 33 8    • RDW 07/21/2022 12 3    • Platelets 73/49/9906 204    • MPV 07/21/2022 10 8    • Sodium 07/21/2022 141    • Potassium 07/21/2022 3 7    • Chloride 07/21/2022 105    • CO2 07/21/2022 32    • ANION GAP 07/21/2022 4    • BUN 07/21/2022 8    • Creatinine 07/21/2022 0 80    • Glucose, Fasting 07/21/2022 87    • Calcium 07/21/2022 9 5    • AST 07/21/2022 23    • ALT 07/21/2022 42    • Alkaline Phosphatase 07/21/2022 60    • Total Protein 07/21/2022 8 0    • Albumin 07/21/2022 4 3    • Total Bilirubin 07/21/2022 0 56    • eGFR 07/21/2022 120    • HCV PCR Quantitative 07/21/2022 [id number]    • Test Information 07/21/2022 Comment    • HCV Quantitative Log 07/21/2022 5 433    • Hepatitis C Genotype 07/21/2022 2b    • PLEASE NOTE 07/21/2022 Comment      Radiology Results:   No results found    Cari Zuniga   10/17/22   Cc:

## 2022-10-17 ENCOUNTER — OFFICE VISIT (OUTPATIENT)
Dept: GASTROENTEROLOGY | Facility: CLINIC | Age: 27
End: 2022-10-17
Payer: COMMERCIAL

## 2022-10-17 VITALS
HEART RATE: 71 BPM | TEMPERATURE: 98.2 F | HEIGHT: 67 IN | OXYGEN SATURATION: 98 % | SYSTOLIC BLOOD PRESSURE: 104 MMHG | WEIGHT: 131 LBS | BODY MASS INDEX: 20.56 KG/M2 | RESPIRATION RATE: 18 BRPM | DIASTOLIC BLOOD PRESSURE: 82 MMHG

## 2022-10-17 DIAGNOSIS — R76.8 HEPATITIS C ANTIBODY POSITIVE IN BLOOD: Primary | ICD-10-CM

## 2022-10-17 PROCEDURE — 99214 OFFICE O/P EST MOD 30 MIN: CPT | Performed by: INTERNAL MEDICINE

## 2022-10-17 NOTE — PATIENT INSTRUCTIONS
Hepatitis C antibody positive in blood  hSreya Barrios has chronic hepatitis-C  Genotype 2b  Treatment Epclusa  Started treatment 10/9  Duration of treatment will be 12 weeks  Shreya Barrios is immune to both hepatitis A and hepatitis-B  Check hepatitis C RNA and liver profile and CBC at week 4  Patient is tolerating his treatment  I did remind him of the need to be very compliant with medication and try not to miss any doses  He is taking medication at the same time every day as well  Ways to prevent transmission of Hepatitis C:  -Avoid sharing toothbrushes and other dental equipment   -Avoid sharing razors  -Do not donate blood  -If using Illicit drugs you should stop using and enter into substance abuse treatment program  -If bleeding make sure others are not exposed to your blood

## 2022-10-21 ENCOUNTER — TELEPHONE (OUTPATIENT)
Dept: OTHER | Facility: OTHER | Age: 27
End: 2022-10-21

## 2022-10-21 ENCOUNTER — TELEPHONE (OUTPATIENT)
Dept: GASTROENTEROLOGY | Facility: CLINIC | Age: 27
End: 2022-10-21

## 2022-10-21 NOTE — TELEPHONE ENCOUNTER
Patient called to report he missed Epclusa dose last night  Discussed with patient that he can take dose if less than 12 hours from normal dose time  Skip dose and start at regular time tomorrow if more than 12 hours

## 2022-10-21 NOTE — TELEPHONE ENCOUNTER
Patients GI provider:  Dr Mickey Bullock    Number to return call: [phone]     Reason for call: Pt called and requested a call back he has some questions regarding medication please reach out thank you      Scheduled procedure/appointment date if applicable: n/a

## 2022-11-09 ENCOUNTER — LAB (OUTPATIENT)
Dept: LAB | Facility: CLINIC | Age: 27
End: 2022-11-09

## 2022-11-09 DIAGNOSIS — R76.8 HEPATITIS C ANTIBODY POSITIVE IN BLOOD: ICD-10-CM

## 2022-11-09 LAB
ALBUMIN SERPL BCP-MCNC: 4.2 G/DL (ref 3.5–5)
ALP SERPL-CCNC: 62 U/L (ref 46–116)
ALT SERPL W P-5'-P-CCNC: 31 U/L (ref 12–78)
ANION GAP SERPL CALCULATED.3IONS-SCNC: -1 MMOL/L (ref 4–13)
AST SERPL W P-5'-P-CCNC: 19 U/L (ref 5–45)
BILIRUB SERPL-MCNC: 0.53 MG/DL (ref 0.2–1)
BUN SERPL-MCNC: 9 MG/DL (ref 5–25)
CALCIUM SERPL-MCNC: 9.7 MG/DL (ref 8.3–10.1)
CHLORIDE SERPL-SCNC: 105 MMOL/L (ref 96–108)
CO2 SERPL-SCNC: 33 MMOL/L (ref 21–32)
CREAT SERPL-MCNC: 0.86 MG/DL (ref 0.6–1.3)
ERYTHROCYTE [DISTWIDTH] IN BLOOD BY AUTOMATED COUNT: 12.4 % (ref 11.6–15.1)
GFR SERPL CREATININE-BSD FRML MDRD: 117 ML/MIN/1.73SQ M
GLUCOSE SERPL-MCNC: 111 MG/DL (ref 65–140)
HCT VFR BLD AUTO: 41.7 % (ref 36.5–49.3)
HGB BLD-MCNC: 14.4 G/DL (ref 12–17)
MCH RBC QN AUTO: 29.5 PG (ref 26.8–34.3)
MCHC RBC AUTO-ENTMCNC: 34.5 G/DL (ref 31.4–37.4)
MCV RBC AUTO: 86 FL (ref 82–98)
PLATELET # BLD AUTO: 254 THOUSANDS/UL (ref 149–390)
PMV BLD AUTO: 10.4 FL (ref 8.9–12.7)
POTASSIUM SERPL-SCNC: 4.1 MMOL/L (ref 3.5–5.3)
PROT SERPL-MCNC: 8.6 G/DL (ref 6.4–8.4)
RBC # BLD AUTO: 4.88 MILLION/UL (ref 3.88–5.62)
SODIUM SERPL-SCNC: 137 MMOL/L (ref 135–147)
WBC # BLD AUTO: 3.5 THOUSAND/UL (ref 4.31–10.16)

## 2022-11-11 NOTE — RESULT ENCOUNTER NOTE
Please inform patient that chemistry panel is normal, liver enzymes are normal   CBC revealed a mildly low white blood cell count of 3 5 normal is 4 3  Hepatitis-C RNA is pending  Would address the mildly low white blood cell count with primary care provider although this is not urgent  It is similar to 6 months ago    Thank you

## 2022-11-15 LAB
HCV RNA SERPL NAA+PROBE-ACNC: NORMAL IU/ML
TEST INFORMATION: NORMAL

## 2022-11-15 NOTE — RESULT ENCOUNTER NOTE
Please inform patient that hepatitis-C RNA is negative  This means that there is no circulating hepatitis-C virus in his blood    Thank you

## 2022-11-28 ENCOUNTER — HOSPITAL ENCOUNTER (EMERGENCY)
Facility: HOSPITAL | Age: 27
Discharge: HOME | End: 2022-11-28
Attending: EMERGENCY MEDICINE

## 2022-11-28 ENCOUNTER — APPOINTMENT (EMERGENCY)
Dept: CT IMAGING | Facility: HOSPITAL | Age: 27
End: 2022-11-28

## 2022-11-28 VITALS
RESPIRATION RATE: 15 BRPM | HEIGHT: 67 IN | SYSTOLIC BLOOD PRESSURE: 153 MMHG | BODY MASS INDEX: 20.4 KG/M2 | OXYGEN SATURATION: 98 % | TEMPERATURE: 98.8 F | HEART RATE: 97 BPM | DIASTOLIC BLOOD PRESSURE: 79 MMHG | WEIGHT: 130 LBS

## 2022-11-28 DIAGNOSIS — R51.9 HEADACHE: ICD-10-CM

## 2022-11-28 DIAGNOSIS — S16.1XXA STRAIN OF NECK MUSCLE, INITIAL ENCOUNTER: Primary | ICD-10-CM
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
## 2022-11-28 RX ORDER — IBUPROFEN 600 MG/1
600 TABLET ORAL ONCE
Status: COMPLETED | OUTPATIENT
Start: 2022-11-28 | End: 2022-11-28

## 2022-11-28 RX ORDER — ACETAMINOPHEN 325 MG/1
975 TABLET ORAL ONCE
Status: COMPLETED | OUTPATIENT
Start: 2022-11-28 | End: 2022-11-28

## 2022-11-28 RX ADMIN — ACETAMINOPHEN 975 MG: 325 TABLET ORAL at 21:15

## 2022-11-28 RX ADMIN — IBUPROFEN 600 MG: 600 TABLET ORAL at 21:15
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
## 2022-12-14 ENCOUNTER — TELEPHONE (OUTPATIENT)
Dept: OTHER | Facility: OTHER | Age: 27
End: 2022-12-14

## 2022-12-14 NOTE — TELEPHONE ENCOUNTER
Call from patient requesting call back from [facility] because he has a question about his medication  He would like to know if he is done with Epclusa treatment or if he needs to do the last refill  He was under the impression that the treatment was only for 12 weeks  Please return patient call
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
## 2023-01-23 ENCOUNTER — OFFICE VISIT (OUTPATIENT)
Dept: GASTROENTEROLOGY | Facility: CLINIC | Age: 28
End: 2023-01-23

## 2023-01-23 VITALS
SYSTOLIC BLOOD PRESSURE: 112 MMHG | OXYGEN SATURATION: 99 % | HEART RATE: 80 BPM | BODY MASS INDEX: 21.35 KG/M2 | RESPIRATION RATE: 18 BRPM | HEIGHT: 67 IN | WEIGHT: 136 LBS | DIASTOLIC BLOOD PRESSURE: 70 MMHG

## 2023-01-23 DIAGNOSIS — R76.8 HEPATITIS C ANTIBODY POSITIVE IN BLOOD: Primary | ICD-10-CM

## 2023-01-23 NOTE — ASSESSMENT & PLAN NOTE
Radha Perez has chronic hepatitis-C  Genotype 2b  Treatment Epclusa  Started treatment 10/9  Duration of treatment will be 12 weeks  Radha Perez is immune to both hepatitis A and hepatitis-B  Hepatitis C RNA quantitative test by PCR was undetectable at about week 4  Radha Perez has completed 12 weeks of Epclusa  I suspect he has a sustained virologic response  Check hepatitis C RNA quantitative test by PCR week 12 after treatment  He will follow-up with our nursing navigator regarding this as well  Ways to prevent transmission of Hepatitis C:  -Avoid sharing toothbrushes and other dental equipment   -Avoid sharing razors  -Do not donate blood  -If using Illicit drugs you should stop using and enter into substance abuse treatment program  -If bleeding make sure others are not exposed to your blood

## 2023-01-23 NOTE — PATIENT INSTRUCTIONS
Hepatitis C antibody positive in blood  Sreekanth Her has chronic hepatitis-C  Genotype 2b  Treatment Epclusa  Started treatment 10/9  Duration of treatment will be 12 weeks  Sreekanth Her is immune to both hepatitis A and hepatitis-B  Hepatitis C RNA quantitative test by PCR was undetectable at about week 4  Sreekanth Her has completed 12 weeks of Epclusa  I suspect he has a sustained virologic response  Check hepatitis C RNA quantitative test by PCR week 12 after treatment  He will follow-up with our nursing navigator regarding this as well  Ways to prevent transmission of Hepatitis C:  -Avoid sharing toothbrushes and other dental equipment   -Avoid sharing razors  -Do not donate blood  -If using Illicit drugs you should stop using and enter into substance abuse treatment program  -If bleeding make sure others are not exposed to your blood

## 2023-03-30 ENCOUNTER — TELEPHONE (OUTPATIENT)
Dept: PSYCHIATRY | Facility: CLINIC | Age: 28
End: 2023-03-30

## 2023-03-30 NOTE — TELEPHONE ENCOUNTER
A message was left for patient to return call to intake dept  PT will be added to wait list at that time

## 2023-04-19 ENCOUNTER — LAB (OUTPATIENT)
Dept: LAB | Facility: CLINIC | Age: 28
End: 2023-04-19

## 2023-04-19 DIAGNOSIS — R76.8 HEPATITIS C ANTIBODY POSITIVE IN BLOOD: ICD-10-CM

## 2023-04-19 LAB
ALBUMIN SERPL BCP-MCNC: 4.1 G/DL (ref 3.5–5)
ALP SERPL-CCNC: 60 U/L (ref 46–116)
ALT SERPL W P-5'-P-CCNC: 16 U/L (ref 12–78)
AST SERPL W P-5'-P-CCNC: 14 U/L (ref 5–45)
BILIRUB DIRECT SERPL-MCNC: 0.17 MG/DL (ref 0–0.2)
BILIRUB SERPL-MCNC: 0.52 MG/DL (ref 0.2–1)
PROT SERPL-MCNC: 7.5 G/DL (ref 6.4–8.4)

## 2023-04-24 LAB
HCV RNA SERPL NAA+PROBE-ACNC: NORMAL IU/ML
TEST INFORMATION: NORMAL

## 2023-04-24 NOTE — RESULT ENCOUNTER NOTE
Please inform patient that hepatitis C RNA is negative revealing complete eradication of hepatitis C    Thank you

## 2023-12-12 ENCOUNTER — OFFICE VISIT (OUTPATIENT)
Dept: URGENT CARE | Facility: CLINIC | Age: 28
End: 2023-12-12
Payer: COMMERCIAL

## 2023-12-12 VITALS — OXYGEN SATURATION: 100 % | TEMPERATURE: 99.2 F | HEART RATE: 88 BPM | RESPIRATION RATE: 18 BRPM

## 2023-12-12 DIAGNOSIS — H10.33 ACUTE CONJUNCTIVITIS OF BOTH EYES, UNSPECIFIED ACUTE CONJUNCTIVITIS TYPE: ICD-10-CM

## 2023-12-12 DIAGNOSIS — J06.9 ACUTE URI: Primary | ICD-10-CM

## 2023-12-12 PROCEDURE — 99213 OFFICE O/P EST LOW 20 MIN: CPT | Performed by: PHYSICIAN ASSISTANT

## 2023-12-12 RX ORDER — TOBRAMYCIN 3 MG/ML
1 SOLUTION/ DROPS OPHTHALMIC
Qty: 1.8 ML | Refills: 0 | Status: SHIPPED | OUTPATIENT
Start: 2023-12-12 | End: 2023-12-19

## 2023-12-12 RX ORDER — METHYLPREDNISOLONE 4 MG/1
TABLET ORAL
Qty: 1 EACH | Refills: 0 | Status: SHIPPED | OUTPATIENT
Start: 2023-12-12

## 2023-12-12 RX ORDER — AZITHROMYCIN 250 MG/1
TABLET, FILM COATED ORAL
Qty: 6 TABLET | Refills: 0 | Status: SHIPPED | OUTPATIENT
Start: 2023-12-12 | End: 2023-12-16
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
## 2023-12-12 NOTE — LETTER
December 12, 2023     Patient: Fabi Gale   YOB: 1995   Date of Visit: 12/12/2023       To Whom It May Concern: It is my medical opinion that Marilyn Sauer should return to work on 12/14/2023. If you have any questions or concerns, please don't hesitate to call.          Sincerely,        Cherylene Pronto, PA-C    CC: No Recipients

## 2024-12-02 ENCOUNTER — HOSPITAL ENCOUNTER (EMERGENCY)
Facility: HOSPITAL | Age: 29
Discharge: HOME | End: 2024-12-02
Attending: INTERNAL MEDICINE
Payer: COMMERCIAL

## 2024-12-02 ENCOUNTER — APPOINTMENT (EMERGENCY)
Dept: RADIOLOGY | Facility: HOSPITAL | Age: 29
End: 2024-12-02
Payer: COMMERCIAL

## 2024-12-02 VITALS
OXYGEN SATURATION: 99 % | TEMPERATURE: 98.8 F | HEART RATE: 82 BPM | SYSTOLIC BLOOD PRESSURE: 145 MMHG | WEIGHT: 135 LBS | BODY MASS INDEX: 21.19 KG/M2 | HEIGHT: 67 IN | RESPIRATION RATE: 18 BRPM | DIASTOLIC BLOOD PRESSURE: 84 MMHG

## 2024-12-02 DIAGNOSIS — M54.17 LUMBOSACRAL RADICULOPATHY: ICD-10-CM

## 2024-12-02 DIAGNOSIS — M54.9 MUSCULOSKELETAL BACK PAIN: Primary | ICD-10-CM

## 2024-12-02 PROCEDURE — 99283 EMERGENCY DEPT VISIT LOW MDM: CPT

## 2024-12-02 PROCEDURE — 96372 THER/PROPH/DIAG INJ SC/IM: CPT

## 2024-12-02 PROCEDURE — 99284 EMERGENCY DEPT VISIT MOD MDM: CPT | Performed by: INTERNAL MEDICINE

## 2024-12-02 PROCEDURE — 72100 X-RAY EXAM L-S SPINE 2/3 VWS: CPT

## 2024-12-02 RX ORDER — PREDNISONE 20 MG/1
TABLET ORAL
Qty: 20 TABLET | Refills: 0 | Status: SHIPPED | OUTPATIENT
Start: 2024-12-02 | End: 2024-12-17

## 2024-12-02 RX ORDER — CYCLOBENZAPRINE HCL 10 MG
10 TABLET ORAL 2 TIMES DAILY PRN
Qty: 20 TABLET | Refills: 0 | Status: SHIPPED | OUTPATIENT
Start: 2024-12-02

## 2024-12-02 RX ORDER — CYCLOBENZAPRINE HCL 10 MG
10 TABLET ORAL ONCE
Status: COMPLETED | OUTPATIENT
Start: 2024-12-02 | End: 2024-12-02

## 2024-12-02 RX ORDER — KETOROLAC TROMETHAMINE 30 MG/ML
15 INJECTION, SOLUTION INTRAMUSCULAR; INTRAVENOUS ONCE
Status: COMPLETED | OUTPATIENT
Start: 2024-12-02 | End: 2024-12-02

## 2024-12-02 RX ADMIN — KETOROLAC TROMETHAMINE 15 MG: 30 INJECTION, SOLUTION INTRAMUSCULAR at 19:19

## 2024-12-02 RX ADMIN — CYCLOBENZAPRINE HYDROCHLORIDE 10 MG: 10 TABLET, FILM COATED ORAL at 19:19

## 2024-12-02 NOTE — Clinical Note
Andrey Catalan was seen and treated in our emergency department on 12/2/2024.            No lifting over 10 pounds    Diagnosis: Sciatica    Andrey  may return to work on return date.    He may return on this date: 12/05/2024         If you have any questions or concerns, please don't hesitate to call.      Wil Bernal MD    ______________________________           _______________          _______________  Hospital Representative                              Date                                Time

## 2024-12-02 NOTE — ED PROVIDER NOTES
ED Disposition       None          Assessment & Plan   {Hyperlinks  Risk Stratification - NIHSS - HEART SCORE - Fill out sepsis note and make sure you call [phone] if severe or septic shock:[id number]}    Medical Decision Making  Patient states the Toradol and Flexeril has diminished his pain is taken the edge off.  Patient is in need of a PCP will refer to [facility] primary care patient lives in [place] and will refer to Dr. Garcia.  Prescribed prednisone taper and Flexeril recommend alternating ice and moist heat    Amount and/or Complexity of Data Reviewed  Radiology: ordered and independent interpretation performed.    Risk  Prescription drug management.             Medications - No data to display    ED Risk Strat Scores                                               History of Present Illness   {Hyperlinks  History (Med, Surg, Fam, Social) - Current Medications - Allergies  :[id number]}    No chief complaint on file.      Past Medical History:   Diagnosis Date    Bipolar 2 disorder (HCC)     MAURICE (generalized anxiety disorder) 10/10/2018    Heroin abuse (HCC) 10/10/2018    Manic behavior (HCC)       Past Surgical History:   Procedure Laterality Date    MANDIBLE FRACTURE SURGERY      WISDOM TOOTH EXTRACTION        Family History   Problem Relation Age of Onset    Seizures Mother     Multiple sclerosis Mother     Hepatitis Father       Social History     Tobacco Use    Smoking status: Former     Current packs/day: 0.50     Average packs/day: 0.5 packs/day for 6.0 years (3.0 ttl pk-yrs)     Types: Cigarettes    Smokeless tobacco: Former     Types: Chew    Tobacco comments:     Vapes- current user 4/18/22   Vaping Use    Vaping status: Every Day   Substance Use Topics    Alcohol use: No    Drug use: Not Currently     Types: Amphetamines, Cocaine, Hydrocodone, Heroin, Marijuana, Oxycodone     Comment: Hasn't used since 9/2/2018      E-Cigarette/Vaping    E-Cigarette Use Current Every Day User        E-Cigarette/Vaping Substances      I have reviewed and agree with the history as documented.     29-year-old male accompanied by his wife presents with chief complaint of back pain which started last evening.  Patient states he had severe back pain and the pain is radiating down his right gluteal area all the way down the back of his leg to about his foot.  He has no history of injury, or trauma but states excruciating pain with flexion or extension or lateral bending.  Patient has had previous history of back pain however not this severe.  Patient also has a previous history of opioid dependence, he does vape, no alcohol or drug use at this time.        Review of Systems   Constitutional: Negative.    Respiratory: Negative.     Cardiovascular: Negative.    Gastrointestinal: Negative.    Genitourinary: Negative.    Musculoskeletal:  Positive for back pain and gait problem.   Skin: Negative.    Neurological:  Negative for weakness and numbness.   Hematological: Negative.    Psychiatric/Behavioral: Negative.             Objective   {Hyperlinks  Historical Vitals - Historical Labs - Chart Review/Microbiology - Last Echo - Code Status  :[id number]}    ED Triage Vitals   Temp Pulse BP Resp SpO2 Patient Position - Orthostatic VS   -- -- -- -- -- --      Temp src Heart Rate Source BP Location FiO2 (%) Pain Score    -- -- -- -- --      Vitals    None         Physical Exam  Vitals and nursing note reviewed. Exam conducted with a chaperone present (Spouse).   Constitutional:       General: He is not in acute distress.     Appearance: Normal appearance. He is not ill-appearing.   HENT:      Head: Normocephalic and atraumatic.      Mouth/Throat:      Mouth: Mucous membranes are moist.   Eyes:      Extraocular Movements: Extraocular movements intact.      Conjunctiva/sclera: Conjunctivae normal.   Cardiovascular:      Rate and Rhythm: Normal rate and regular rhythm.      Pulses: Normal pulses.   Pulmonary:      Effort:  Pulmonary effort is normal.      Breath sounds: Normal breath sounds.   Abdominal:      General: Abdomen is flat. Bowel sounds are normal.      Palpations: Abdomen is soft.   Musculoskeletal:         General: Tenderness present. No swelling, deformity or signs of injury.      Cervical back: Normal range of motion and neck supple.      Right lower leg: No edema.      Left lower leg: No edema.   Skin:     General: Skin is warm and dry.      Capillary Refill: Capillary refill takes less than 2 seconds.   Neurological:      General: No focal deficit present.      Mental Status: He is alert and oriented to person, place, and time.      Cranial Nerves: No cranial nerve deficit.      Sensory: No sensory deficit.      Motor: No weakness.      Coordination: Coordination normal.      Gait: Gait abnormal.      Deep Tendon Reflexes: Reflexes normal.   Psychiatric:         Mood and Affect: Mood normal.         Behavior: Behavior normal.         Thought Content: Thought content normal.         Judgment: Judgment normal.         Results Reviewed       None            No orders to display       Procedures    ED Medication and Procedure Management   Prior to Admission Medications   Prescriptions Last Dose Informant Patient Reported? Taking?   Sofosbuvir-Velpatasvir (Epclusa) 400-100 MG tablet   No No   Sig: Take 1 tablet by mouth daily   buprenorphine (SUBUTEX) 8 mg   Yes No   Sig: dissolve 1 tablet under the tongue three times a day DO NOT chew or swallow   cloNIDine (CATAPRES) 0.1 mg tablet   No No   Sig: Take 1 tablet (0.1 mg total) by mouth 3 (three) times a day   methylPREDNISolone 4 MG tablet therapy pack   No No   Sig: Use as directed on package      Facility-Administered Medications: None     Patient's Medications   Discharge Prescriptions    No medications on file     No discharge procedures on file.  ED SEPSIS DOCUMENTATION

## 2024-12-17 ENCOUNTER — OFFICE VISIT (OUTPATIENT)
Dept: FAMILY MEDICINE CLINIC | Facility: CLINIC | Age: 29
End: 2024-12-17
Payer: COMMERCIAL

## 2024-12-17 VITALS
HEIGHT: 66 IN | SYSTOLIC BLOOD PRESSURE: 112 MMHG | WEIGHT: 138.4 LBS | BODY MASS INDEX: 22.24 KG/M2 | OXYGEN SATURATION: 99 % | TEMPERATURE: 96.5 F | DIASTOLIC BLOOD PRESSURE: 64 MMHG | HEART RATE: 77 BPM

## 2024-12-17 DIAGNOSIS — M54.31 SCIATICA OF RIGHT SIDE: Primary | ICD-10-CM

## 2024-12-17 DIAGNOSIS — M54.41 ACUTE RIGHT-SIDED LOW BACK PAIN WITH RIGHT-SIDED SCIATICA: ICD-10-CM

## 2024-12-17 DIAGNOSIS — Z76.89 ENCOUNTER TO ESTABLISH CARE: ICD-10-CM

## 2024-12-17 PROCEDURE — 99204 OFFICE O/P NEW MOD 45 MIN: CPT | Performed by: NURSE PRACTITIONER

## 2024-12-17 RX ORDER — KETOROLAC TROMETHAMINE 30 MG/ML
60 INJECTION, SOLUTION INTRAMUSCULAR; INTRAVENOUS ONCE
Status: DISCONTINUED | OUTPATIENT
Start: 2024-12-17 | End: 2024-12-17

## 2024-12-17 RX ORDER — NAPROXEN 500 MG/1
500 TABLET ORAL 2 TIMES DAILY PRN
Qty: 30 TABLET | Refills: 0 | Status: SHIPPED | OUTPATIENT
Start: 2024-12-17

## 2024-12-17 RX ORDER — DEXAMETHASONE SODIUM PHOSPHATE 10 MG/ML
10 INJECTION INTRAMUSCULAR; INTRAVENOUS ONCE
Status: DISCONTINUED | OUTPATIENT
Start: 2024-12-17 | End: 2024-12-17

## 2024-12-17 NOTE — PROGRESS NOTES
Name: Andrey Catalan      : 1995      MRN: [id number]  Encounter Provider: SIGIFREDO Phillips  Encounter Date: 2024   Encounter department: [facility] PRIMARY CARE  :  Assessment & Plan  Sciatica of right side    Orders:  •  naproxen (NAPROSYN) 500 mg tablet; Take 1 tablet (500 mg total) by mouth 2 (two) times a day as needed for mild pain (with meals)  •  Ambulatory Referral to [facility]; Future    Acute right-sided low back pain with right-sided sciatica    Orders:  •  naproxen (NAPROSYN) 500 mg tablet; Take 1 tablet (500 mg total) by mouth 2 (two) times a day as needed for mild pain (with meals)  •  Ambulatory Referral to [facility]; Future    Encounter to establish care                History of Present Illness     Patient is here for new patient visit to establish care.  He was  seen in urgent care on  for back pain.  He had sudden onset of pain when he was bending and twisting.  He had been at work but was not doing anything in particular.  He was seen in urgent care given Toradol Flexeril and a steroid course.  X-ray did not show any issue or degenerative changes.  His pain was an 8 out of 10 it started in the lumbar region and radiated to right down into his right thigh sterilely.  He denies paresthesias he has not noticed any weakness of his lower extremities.  No incontinence of bowel or bladder.  Pain improved to about a 5 but is easily exacerbated.  He has a 2-year-old so tends to get worse and every time he tries to bend over and change the diaper.  Also more painful in the morning after he awakens.  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  The full dose of Flexeril is sedating for him.  He is on Suboxone.  He follows with an online physician.  He had a past history of heroin use but he has been clean for 7 years.  Also history of hep C but he  "completed therapy and states he was considered cured.  Not had labs for 2 years  Overall doing well denies problem with mood      Review of Systems    Objective   /64 (BP Location: Left arm, Patient Position: Sitting, Cuff Size: Adult)   Pulse 77   Temp (!) 96.5 °F (35.8 °C) (Tympanic)   Ht 5' 6\" (1.676 m)   Wt 62.8 kg (138 lb 6.4 oz)   SpO2 99%   BMI 22.34 kg/m²      Physical Exam  Musculoskeletal:      Comments: Tender over LS spine also over bilateral SI areas  Negative hip tendon tenderness.  Difficult pain with forward bending  But strength strong and equal.  Right patellar reflexes hyperreflexive +3,  L is + 2  Looks a bit uncomfortable with position change   Neurological:      Mental Status: He is alert.   Psychiatric:         Mood and Affect: Mood normal.         "

## 2024-12-30 ENCOUNTER — EVALUATION (OUTPATIENT)
Dept: PHYSICAL THERAPY | Facility: CLINIC | Age: 29
End: 2024-12-30
Payer: COMMERCIAL

## 2024-12-30 VITALS — SYSTOLIC BLOOD PRESSURE: 111 MMHG | TEMPERATURE: 97.3 F | DIASTOLIC BLOOD PRESSURE: 66 MMHG | HEART RATE: 61 BPM

## 2024-12-30 DIAGNOSIS — M54.31 SCIATICA OF RIGHT SIDE: ICD-10-CM

## 2024-12-30 DIAGNOSIS — M54.41 ACUTE RIGHT-SIDED LOW BACK PAIN WITH RIGHT-SIDED SCIATICA: ICD-10-CM

## 2024-12-30 PROCEDURE — 97110 THERAPEUTIC EXERCISES: CPT | Performed by: PHYSICAL THERAPIST

## 2024-12-30 PROCEDURE — 97140 MANUAL THERAPY 1/> REGIONS: CPT | Performed by: PHYSICAL THERAPIST

## 2024-12-30 PROCEDURE — 97162 PT EVAL MOD COMPLEX 30 MIN: CPT | Performed by: PHYSICAL THERAPIST

## 2024-12-30 NOTE — TELEPHONE ENCOUNTER
Additional Information  • Negative: Is this related to a work injury?  • Negative: Is this related to an MVA?  • Negative: Are you currently recieving homecare services?  • Negative: Has the patient had unexplained weight loss?  • Negative: Does the patient have a fever?  • Negative: Is the patient experiencing urine retention?  • Negative: Is the patient experiencing acute drop foot or paralysis?  • Negative: Has the patient experienced major trauma? (fall from height, high speed collision, direct blow to spine) and is also experiencing nausea, light-headedness, or loss of consciousness?  • Negative: Is the patient experiencing blood in sputum?  • Negative: Is this a chronic condition?    Protocols used: [facility] Protocol

## 2024-12-30 NOTE — PROGRESS NOTES
PT Evaluation     Today's date: 2024  Patient name: Andrey Catalan  : 1995  MRN: [id number]  Referring provider: Tonia Jacobo CRNP  Dx:   Encounter Diagnosis     ICD-10-CM    1. Sciatica of right side  M54.31 Ambulatory Referral to [facility]      2. Acute right-sided low back pain with right-sided sciatica  M54.41 Ambulatory Referral to [facility]                     Assessment  Symptom irritability: moderate    Assessment details: Andrey Catalan is a pleasant 29 y.o. presenting to physical therapy with MD referral for Sciatica of right side and Acute right-sided low back pain with right-sided sciatica. Pt presents with signs and symptoms suggestive of lumbar disc herniation with peripheralization of symptoms with flexion and centralization of symptoms with extension-biased exercises. Pt presents with normal LE reflexes, intact sensation to light touch, and no myotomal weakness.    Problem list:  Limited lumbar ROM, decreased hip/core strength, limited lower extremity flexibility, abnormal gait, and increased neural tension    Treatment to include: Manual therapy techniques, lumbar ROM, lower extremity/core strengthening, neuromuscular control exercises, balance/proprioception training, extension-biased program, instruction in a comprehensive HEP, and modalities as needed.     This pt would benefit from skilled PT services to address their impairments and functional limitations to maximize functional outcome.     Barriers to therapy: Chronicity of symptoms, anxiety, manic behavior, bipolar  Understanding of Dx/Px/POC: good     Prognosis: good    Goals  ST. Pt will improve lumbar flexion ROM to at least 45 degrees in 4 weeks.  2. Pt will improve lumbar SB left to at least 20 degrees in 4 weeks.    LT. Pt will be able to change his daughter's diapers with minimal to no pain in 8 weeks.  2. Pt will be independent in a comprehensive HEP in 8 weeks.   3. Pt  "will be able to negotiate stairs with a reciprocal pattern at least 90% of the time with no more than mild discomfort in 8 weeks.    Plan  Patient would benefit from: skilled physical therapy    Frequency: 2-3x week  Duration in weeks: 8  Plan of Care beginning date: 12/30/2024  Plan of Care expiration date: 2/28/2025  Treatment plan discussed with: patient  Plan details: 2-3 x per week for 6-8 weeks.  Lumbar treatment classifications: extension-biased  Start Back Tool: Moderate Risk (6-8 visits)        Subjective Evaluation    History of Present Illness  Mechanism of injury: Pt reports approximately 3 weeks ago, he turned to the left and felt like his back seized up and he could not take a full breath. Pt felt shooting pain down the back of his right thigh. When pt returned home, he could not bend forward, lay or move without significant pain. Pt tried to work the following day, but was unable. Pt went to the ER for assessment and per pt they stated he might have a disc issue. Pt states for the past few years, he has been experiencing lower back pain with flexion. Pt was prescribed a medrol dose pack with some improvement in symptoms as well as a muscle relaxor. Pt reported significant drowsiness with muscle relaxors so he is not taking them.  X-rays of lumbar spine taken at the ER revealed \"IMPRESSION: No acute osseous abnormality.\" Pt continues to report soreness in his lower back, but is still limited in lumbar flexion, and lifting, prolonged positions. Pt denies any change in bowel/bladder control or saddle anesthesia.    Premorbid status:  - ADLs: Independent with mild difficulty  - Work: Full time, Full duty ( for [employer]- standing majority of the day)  - Recreation: walking, jogging, active    Current status:  - ADLs/Functional activities:   - Stairs Step to pattern 50% with moderate pain in lower and right leg   - Sit to stand with use of BUEs with moderate pain   - Walking 15-20 minutes " prior to increase in lower back pain    - Standing 30 minutes prior to increase in pain   - Sitting 20 minutes prior to increase in pain in lower back   - Sleeping with 2 nightly sleep disturbances due to pain   - Bending forward to change his 2 year old daughter's diaper with severe pain  - Work: Full time, Full duty  for [employer]- standing majority of the day)  - Recreation: walking  Patient Goals  Patient goals for therapy: decreased pain and increased motion  Patient goal: Be able to care for 2yr old daughter  Pain  Current pain rating: 3  At best pain rating: 3  At worst pain ratin  Location: right side of lower back into right thigh to foot  Quality: dull ache and sharp  Alleviating factors: prone lying, standing, supportive chair sitting.  Aggravating factors: sitting (laying supine, right sidelying)  Progression: improved      Diagnostic Tests  X-ray: normal  Treatments  Previous treatment: medication  Current treatment: medication and physical therapy        Objective     Palpation     Additional Palpation Details  No significant increase in muscle tension and tenderness noted on IE of lumbar spine musculature.    Neurological Testing     Sensation     Lumbar   Left   Intact: light touch    Right   Intact: light touch    Reflexes   Left   Patellar (L4): normal (2+)  Achilles (S1): normal (2+)    Right   Patellar (L4): normal (2+)  Achilles (S1): normal (2+)    Active Range of Motion     Lumbar   Flexion: 23 (pain into right leg) degrees   Extension: 25 degrees   Left lateral flexion: 18 degrees     Right lateral flexion: 25 degrees     Joint Play     Hypomobile: L1, L2, L3, L4, L5 and S1     Pain: L5 and S1   Mechanical Assessment    Cervical      Thoracic      Lumbar    Standing flexion: repeated movements   Pain location:peripheralized  Pain level: increased  Sitting flexion: sustained positions  Pain location: peripheralized  Pain intensity: worse  Standing extension: repeated  movements  Pain location: no change  Pain intensity: better  Pain level: decreased  Lying extension: sustained positions  Pain intensity: better  Pain level: decreased    Strength/Myotome Testing     Lumbar   Left   Heel walk: normal  Toe walk: normal    Right   Heel walk: normal  Toe walk: normal    Left Hip   Planes of Motion   Flexion: 5  External rotation: 4+  Internal rotation: 4+    Right Hip   Planes of Motion   Flexion: 4+  External rotation: 4+  Internal rotation: 4    Left Knee   Flexion: 5  Extension: 5    Right Knee   Flexion: 4+  Extension: 4+    Left Ankle/Foot   Dorsiflexion: 5  Plantar flexion: 5  Great toe extension: 5    Right Ankle/Foot   Dorsiflexion: 5  Plantar flexion: 5  Great toe extension: 5    Additional Strength Details  Flexibility:  - HS: moderate restriction on the L, severe on the R  - Hip IR: mod restriction on the L, severe on the R  - Hip ER: minimal restriction on the L, mild on the R  - Hip Flexion: 90 degrees on L prior to increase in pain, 60 degrees on R prior to increase in pain    Gait: Pt ambulates over level surface at a slow pace with a mildly antalgic pattern     Tests     Lumbar     Left   Negative passive SLR and slump test.     Right   Positive passive SLR and slump test.             Precautions: Chronicity of symptoms, anxiety, manic behavior, bipolar  CO-MORBIDITIES:  HEP ACCESS CODE: Access Code: [email or web address]  (Updated: 12-30-24)  FOTO Completed On: 12-30-24    POC Expires Reeval for Medicare to be completed Unit LImit Auth Expiration Date PT/OT/STVisit Limit   2-28-25 By visit  10 unknown NA no    Completed on                  TREATMENT DIARY  Auth Status DATE 12-30 (IE)            NA Visit # 1             Remaining 9            MANUAL THERAPY             CPA mobilizations L1/L2- L5/S1 JG, grade IV                                                                             THERAPEUTIC EXERCISE             NuStep with towel roll to improve LE strength/endurance 10 mins,  "L4 NV                         Standing:             - lumbar extension at high mat 2 x 10 NV            - hip extension 2 x 10 ea NV            - hip abduction 2 x 10 ea NV                         Prone:             - props 60\" x 2 NV            - laying on wedge 5 mins NV                                                   NEUROMUSCULAR REEDUCATION              Prone:             - alt UE lifts 5  x 5\" ea NV            - alt LE lifts 5 x 5\" ea NV                         Supine:             - PPT 20 x 5\" NV            - TA 20 x 5\" NV            - TA with alt march 10 x 5\" ea NV            - TA with bent knee fall out 10 x 5\" ea NV                         Standing (maintaining TA):             - rows             - pulldowns             - thoracic antirotation                                                                                                        THERAPEUTIC ACTIVITY             Front step ups             Lateral step ups             Mini squats                                                                                                                                                                                      GAIT TRAINING                                                                              MODALITIES             MH/cryo as needed                               * On initial evaluation, educated pt on anatomy, pathology, and exercise rationale. Provided pt with basic HEP and ensured proper exercise performance. Educated pt to call with any questions or concerns.  Educated pt to avoid prolonged sitting > 20-30 minutes at a time. Educated to begin to utilize lumbar roll when sitting and discussed ways to improve ergonomics while at work.      "

## 2025-01-06 ENCOUNTER — OFFICE VISIT (OUTPATIENT)
Dept: PHYSICAL THERAPY | Facility: CLINIC | Age: 30
End: 2025-01-06
Payer: COMMERCIAL

## 2025-01-06 DIAGNOSIS — M54.31 SCIATICA OF RIGHT SIDE: Primary | ICD-10-CM

## 2025-01-06 DIAGNOSIS — M54.41 ACUTE RIGHT-SIDED LOW BACK PAIN WITH RIGHT-SIDED SCIATICA: ICD-10-CM

## 2025-01-06 PROCEDURE — 97112 NEUROMUSCULAR REEDUCATION: CPT | Performed by: PHYSICAL THERAPIST

## 2025-01-06 PROCEDURE — 97110 THERAPEUTIC EXERCISES: CPT | Performed by: PHYSICAL THERAPIST

## 2025-01-06 PROCEDURE — 97140 MANUAL THERAPY 1/> REGIONS: CPT | Performed by: PHYSICAL THERAPIST

## 2025-01-06 NOTE — PROGRESS NOTES
Daily Note     Today's date: 2025  Patient name: Andrey Catalan  : 1995  MRN: [id number]  Referring provider: Tonia Jacobo CRNP  Dx:   Encounter Diagnosis     ICD-10-CM    1. Sciatica of right side  M54.31       2. Acute right-sided low back pain with right-sided sciatica  M54.41                      Subjective: Pt reports improvement in symptoms following initial evaluation. Pt has been utilizing lumbar support and altering his work positions to reduce load on lumbar spine.      Objective: See treatment diary below      Assessment: Initiated exercises this date to address impairments noted during initial evaluation. Pt responded favorably to extension-biased exercise program this date with reduction in back pain and radicular symptoms. Tolerated treatment well. Patient demonstrated fatigue post treatment, exhibited good technique with therapeutic exercises, and would benefit from continued PT      Plan: Progress treatment as tolerated.       Precautions: Chronicity of symptoms, anxiety, manic behavior, bipolar  CO-MORBIDITIES:  HEP ACCESS CODE: Access Code: [email or web address]  (Updated: 24)  FOTO Completed On: 24    POC Expires Reeval for Medicare to be completed Unit LImit Auth Expiration Date PT/OT/STVisit Limit   25 By visit  10 unknown NA no    Completed on                  TREATMENT DIARY  Auth Status DATE  (IE) 1-6           NA Visit # 1 2            Remaining 9 8           MANUAL THERAPY             CPA mobilizations L1/L2- L5/S1 JG, grade IV JG, grade IV                                                                            THERAPEUTIC EXERCISE             NuStep with towel roll to improve LE strength/endurance 10 mins, L4 NV 10 mins, L5                        Standing:             - lumbar extension at high mat 2 x 10 NV 3 x 10           - hip extension 2 x 10 ea NV 1 x 10 ea           - hip abduction 2 x 10 ea NV 1 x 10 ea                        Prone:             -  "props 60\" x 2 NV 60\" x 2           - laying on wedge with 2 pillows 5 mins NV 5 mins           - press ups  2 x 10                                     NEUROMUSCULAR REEDUCATION              Prone:             - alt UE lifts 5  x 5\" ea NV 5  x 5\" ea            - alt LE lifts 5 x 5\" ea NV 5  x 5\" ea                        Supine:             - PPT 20 x 5\" NV            - TA 20 x 5\" NV 20 x 5\"           - TA with alt march 10 x 5\" ea NV            - TA with bent knee fall out 10 x 5\" ea NV                         Standing (maintaining TA):             - rows             - pulldowns             - thoracic antirotation                                                                                                        THERAPEUTIC ACTIVITY             Front step ups             Lateral step ups             Mini squats                                                                                                                                                                                      GAIT TRAINING                                                                              MODALITIES             MH/cryo as needed                                   "

## 2025-01-09 ENCOUNTER — OFFICE VISIT (OUTPATIENT)
Dept: PHYSICAL THERAPY | Facility: CLINIC | Age: 30
End: 2025-01-09
Payer: COMMERCIAL

## 2025-01-09 DIAGNOSIS — M54.31 SCIATICA OF RIGHT SIDE: ICD-10-CM

## 2025-01-09 DIAGNOSIS — M54.41 ACUTE RIGHT-SIDED LOW BACK PAIN WITH RIGHT-SIDED SCIATICA: Primary | ICD-10-CM

## 2025-01-09 PROCEDURE — 97140 MANUAL THERAPY 1/> REGIONS: CPT | Performed by: PHYSICAL THERAPIST

## 2025-01-09 PROCEDURE — 97110 THERAPEUTIC EXERCISES: CPT | Performed by: PHYSICAL THERAPIST

## 2025-01-09 NOTE — PROGRESS NOTES
"Daily Note     Today's date: 2025  Patient name: Andrey Catalan  : 1995  MRN: [id number]  Referring provider: Tonia Jacobo CRNP  Dx:   Encounter Diagnosis     ICD-10-CM    1. Acute right-sided low back pain with right-sided sciatica  M54.41       2. Sciatica of right side  M54.31                      Subjective: Pt reports improvement in symptoms following previous session.      Objective: See treatment diary below      Assessment: Pt continues to respond well to extension-biased exercises. Pt required minimal verbal cues to perform exercises with correct form and technique. Tolerated treatment well. Patient demonstrated fatigue post treatment, exhibited good technique with therapeutic exercises, and would benefit from continued PT      Plan: Progress treatment as tolerated.       Precautions: Chronicity of symptoms, anxiety, manic behavior, bipolar  CO-MORBIDITIES:  HEP ACCESS CODE: Access Code: [email or web address]  (Updated: 24)  FOTO Completed On: 24    POC Expires Reeval for Medicare to be completed Unit LImit Auth Expiration Date PT/OT/STVisit Limit   25 By visit  10 unknown NA no    Completed on                  TREATMENT DIARY  Auth Status DATE  (IE) 1-6 1-9          NA Visit # 1 2 3           Remaining 9 8 7          MANUAL THERAPY             CPA mobilizations L1/L2- L5/S1 JG, grade IV JG, grade IV JG, grade IV                                                                           THERAPEUTIC EXERCISE             NuStep with towel roll to improve LE strength/endurance 10 mins, L4 NV 10 mins, L5 10 mins, L5                       Standing:             - lumbar extension at high mat 2 x 10 NV 3 x 10 3 x 10          - hip extension 2 x 10 ea NV 1 x 10 ea 1 x 10 ea          - hip abduction 2 x 10 ea NV 1 x 10 ea 1 x 10 ea                       Prone:             - props 60\" x 2 NV 60\" x 2 60\" x 2          - laying on wedge with 2 pillows 5 mins NV 5 mins 5 mins          - press " "ups  2 x 10 2 x 10                                    NEUROMUSCULAR REEDUCATION              Prone:             - alt UE lifts 5  x 5\" ea NV 5  x 5\" ea  5 x 5\"ea          - alt LE lifts 5 x 5\" ea NV 5  x 5\" ea 5 x 5\" ea                       Supine:             - PPT 20 x 5\" NV            - TA 20 x 5\" NV 20 x 5\" 20 x 5\"          - TA with alt march 10 x 5\" ea NV            - TA with bent knee fall out 10 x 5\" fransisco DIAL                         Standing (maintaining TA):             - rows             - pulldowns             - thoracic antirotation                                                                                                        THERAPEUTIC ACTIVITY             Front step ups             Lateral step ups             Mini squats                                                                                                                                                                                      GAIT TRAINING                                                                              MODALITIES             MH/cryo as needed                                     "

## 2025-01-13 ENCOUNTER — OFFICE VISIT (OUTPATIENT)
Dept: PHYSICAL THERAPY | Facility: CLINIC | Age: 30
End: 2025-01-13
Payer: COMMERCIAL

## 2025-01-13 DIAGNOSIS — M54.31 SCIATICA OF RIGHT SIDE: ICD-10-CM

## 2025-01-13 DIAGNOSIS — M54.41 ACUTE RIGHT-SIDED LOW BACK PAIN WITH RIGHT-SIDED SCIATICA: Primary | ICD-10-CM

## 2025-01-13 PROCEDURE — 97112 NEUROMUSCULAR REEDUCATION: CPT | Performed by: PHYSICAL THERAPIST

## 2025-01-13 PROCEDURE — 97140 MANUAL THERAPY 1/> REGIONS: CPT | Performed by: PHYSICAL THERAPIST

## 2025-01-13 PROCEDURE — 97110 THERAPEUTIC EXERCISES: CPT | Performed by: PHYSICAL THERAPIST

## 2025-01-13 NOTE — PROGRESS NOTES
"Daily Note     Today's date: 2025  Patient name: Andrey Catalan  : 1995  MRN: [id number]  Referring provider: Tonia Jacobo CRNP  Dx:   Encounter Diagnosis     ICD-10-CM    1. Acute right-sided low back pain with right-sided sciatica  M54.41       2. Sciatica of right side  M54.31                      Subjective: Pt reports he was able to go a football game, but today is in more pain.      Objective: See treatment diary below      Assessment: Continue to focus on extension-biased exercises. Pt reported significant improvement in reduction in pain/stiffness at conclusion of session. Tolerated treatment well. Patient demonstrated fatigue post treatment, exhibited good technique with therapeutic exercises, and would benefit from continued PT      Plan: Progress treatment as tolerated.       Precautions: Chronicity of symptoms, anxiety, manic behavior, bipolar  CO-MORBIDITIES:  HEP ACCESS CODE: Access Code: [email or web address]  (Updated: 24)  FOTO Completed On: 24    POC Expires Reeval for Medicare to be completed Unit LImit Auth Expiration Date PT/OT/STVisit Limit   25 By visit  10 unknown NA no    Completed on                  TREATMENT DIARY  Auth Status DATE  (IE) 1-6 1-9 1-13         NA Visit # 1 2 3 4          Remaining 9 8 7 6         MANUAL THERAPY             CPA mobilizations L1/L2- L5/S1 JG, grade IV JG, grade IV JG, grade IV JG, grade IV                                                                          THERAPEUTIC EXERCISE             NuStep with towel roll to improve LE strength/endurance 10 mins, L4 NV 10 mins, L5 10 mins, L5 10 mins, L5                      Standing:             - lumbar extension at high mat 2 x 10 NV 3 x 10 3 x 10  3 x 10         - hip extension 2 x 10 ea NV 1 x 10 ea 1 x 10 ea 1 x 10 ea         - hip abduction 2 x 10 ea NV 1 x 10 ea 1 x 10 ea 1 x 10 ea                      Prone:             - props 60\" x 2 NV 60\" x 2 60\" x 2 60\" x 2         - " "laying on wedge with 2 pillows 5 mins NV 5 mins 5 mins 5 mins         - press ups  2 x 10 2 x 10 2 x 10                                   NEUROMUSCULAR REEDUCATION              Prone:             - alt UE lifts 5  x 5\" ea NV 5  x 5\" ea  5 x 5\" ea 5 x 5\" ea         - alt LE lifts 5 x 5\" ea NV 5  x 5\" ea 5 x 5\" ea 5 x 5\" ea                      Supine:             - PPT 20 x 5\" NV            - TA 20 x 5\" NV 20 x 5\" 20 x 5\" 20 x 5\"         - TA with alt march 10 x 5\" ea NV            - TA with bent knee fall out 10 x 5\" ea NV                         Standing (maintaining TA):             - rows             - pulldowns             - thoracic antirotation                                                                                                        THERAPEUTIC ACTIVITY             Front step ups             Lateral step ups             Mini squats                                                                                                                                                                                      GAIT TRAINING                                                                              MODALITIES             MH/cryo as needed                                       "

## 2025-01-16 ENCOUNTER — OFFICE VISIT (OUTPATIENT)
Dept: PHYSICAL THERAPY | Facility: CLINIC | Age: 30
End: 2025-01-16
Payer: COMMERCIAL

## 2025-01-16 DIAGNOSIS — M54.41 ACUTE RIGHT-SIDED LOW BACK PAIN WITH RIGHT-SIDED SCIATICA: Primary | ICD-10-CM

## 2025-01-16 DIAGNOSIS — M54.31 SCIATICA OF RIGHT SIDE: ICD-10-CM

## 2025-01-16 PROCEDURE — 97110 THERAPEUTIC EXERCISES: CPT | Performed by: PHYSICAL THERAPIST

## 2025-01-16 PROCEDURE — 97112 NEUROMUSCULAR REEDUCATION: CPT | Performed by: PHYSICAL THERAPIST

## 2025-01-16 NOTE — PROGRESS NOTES
Daily Note     Today's date: 2025  Patient name: Andrey Catalan  : 1995  MRN: [id number]  Referring provider: Tonia Jacobo CRNP  Dx:   Encounter Diagnosis     ICD-10-CM    1. Acute right-sided low back pain with right-sided sciatica  M54.41       2. Sciatica of right side  M54.31                      Subjective: Patient reports he is a little more sore today compared to yesterday, having a little more pain down the R leg today.       Objective: See treatment diary below      Assessment: Tolerated treatment well. Progressed as charted without incident. Patient experiences most relief from pain with lumbar extension exercises. Patient demonstrated fatigue post treatment, exhibited good technique with therapeutic exercises, and would benefit from continued PT      Plan: Continue per plan of care.  Progress treatment as tolerated.       Precautions: Chronicity of symptoms, anxiety, manic behavior, bipolar  CO-MORBIDITIES:  HEP ACCESS CODE: Access Code: [email or web address]  (Updated: 24)  FOTO Completed On: 24    POC Expires Reeval for Medicare to be completed Unit LImit Auth Expiration Date PT/OT/STVisit Limit   25 By visit  10 unknown NA no    Completed on                  TREATMENT DIARY  Auth Status DATE  (IE) 1-6 1-9 1-13 1-16        NA Visit # 1 2 3 4 5         Remaining 9 8 7 6 5        MANUAL THERAPY             CPA mobilizations L1/L2- L5/S1 JG, grade IV JG, grade IV JG, grade IV JG, grade IV KG, grade IV                                                                         THERAPEUTIC EXERCISE             NuStep with towel roll to improve LE strength/endurance 10 mins, L4 NV 10 mins, L5 10 mins, L5 10 mins, L5 10 mins, L5                     Standing:             - lumbar extension at high mat 2 x 10 NV 3 x 10 3 x 10  3 x 10 3x10        - hip extension 2 x 10 ea NV 1 x 10 ea 1 x 10 ea 1 x 10 ea 1x15 ea        - hip abduction 2 x 10 ea NV 1 x 10 ea 1 x 10 ea 1 x 10 ea 1x15 ea    "                  Prone:             - props 60\" x 2 NV 60\" x 2 60\" x 2 60\" x 2 2 mins        - laying on wedge with 2 pillows 5 mins NV 5 mins 5 mins 5 mins         - press ups  2 x 10 2 x 10 2 x 10 2x10                                  NEUROMUSCULAR REEDUCATION              Prone:             - alt UE lifts 5  x 5\" ea NV 5  x 5\" ea  5 x 5\" ea 5 x 5\" ea         - alt LE lifts 5 x 5\" ea NV 5  x 5\" ea 5 x 5\" ea 5 x 5\" ea         - alt UE/LE lifts     5\" x10 ea        Supine:             - PPT 20 x 5\" NV            - TA 20 x 5\" NV 20 x 5\" 20 x 5\" 20 x 5\" 20 x5\"         - TA with alt march 10 x 5\" ea NV    10x ea 5\"         - TA with bent knee fall out 10 x 5\" ea NV                         Standing (maintaining TA):             - rows             - pulldowns             - thoracic antirotation                                                                                                        THERAPEUTIC ACTIVITY             Front step ups             Lateral step ups             Mini squats                                                                                                                                                                                      GAIT TRAINING                                                                              MODALITIES             MH/cryo as needed                                         "

## 2025-01-20 ENCOUNTER — OFFICE VISIT (OUTPATIENT)
Dept: PHYSICAL THERAPY | Facility: CLINIC | Age: 30
End: 2025-01-20
Payer: COMMERCIAL

## 2025-01-20 DIAGNOSIS — M54.41 ACUTE RIGHT-SIDED LOW BACK PAIN WITH RIGHT-SIDED SCIATICA: Primary | ICD-10-CM

## 2025-01-20 DIAGNOSIS — M54.31 SCIATICA OF RIGHT SIDE: ICD-10-CM

## 2025-01-20 PROCEDURE — 97140 MANUAL THERAPY 1/> REGIONS: CPT | Performed by: PHYSICAL THERAPIST

## 2025-01-20 PROCEDURE — 97110 THERAPEUTIC EXERCISES: CPT | Performed by: PHYSICAL THERAPIST
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
## 2025-01-23 ENCOUNTER — OFFICE VISIT (OUTPATIENT)
Dept: PHYSICAL THERAPY | Facility: CLINIC | Age: 30
End: 2025-01-23
Payer: COMMERCIAL

## 2025-01-23 DIAGNOSIS — M54.41 ACUTE RIGHT-SIDED LOW BACK PAIN WITH RIGHT-SIDED SCIATICA: Primary | ICD-10-CM

## 2025-01-23 DIAGNOSIS — M54.31 SCIATICA OF RIGHT SIDE: ICD-10-CM

## 2025-01-23 PROCEDURE — 97140 MANUAL THERAPY 1/> REGIONS: CPT | Performed by: PHYSICAL THERAPIST

## 2025-01-23 PROCEDURE — 97110 THERAPEUTIC EXERCISES: CPT | Performed by: PHYSICAL THERAPIST

## 2025-01-23 NOTE — PROGRESS NOTES
Daily Note     Today's date: 2025  Patient name: Andrey Catalan  : 1995  MRN: [id number]  Referring provider: Tonia Jacobo CRNP  Dx:   Encounter Diagnosis     ICD-10-CM    1. Acute right-sided low back pain with right-sided sciatica  M54.41       2. Sciatica of right side  M54.31                      Subjective: Pt states he was a little more sore yesterday; however, he has been performing his exercises which help.      Objective: See treatment diary below      Assessment: Progressed exercises this session as charted to enhance functional strength/endurance. Tolerated treatment well. Patient demonstrated fatigue post treatment, exhibited good technique with therapeutic exercises, and would benefit from continued PT      Plan: Progress treatment as tolerated.       Precautions: Chronicity of symptoms, anxiety, manic behavior, bipolar  CO-MORBIDITIES:  HEP ACCESS CODE: Access Code: [email or web address]  (Updated: 24)  FOTO Completed On: 24    POC Expires Reeval for Medicare to be completed Unit LImit Auth Expiration Date PT/OT/STVisit Limit   25 By visit  10 unknown NA no    Completed on                  TREATMENT DIARY  Auth Status DATE  (IE) 1-6 1-9 1-13 1-16 1-20 1-23      NA Visit # 1 2 3 4 5 6 7       Remaining 9 8 7 6 5 4 3      MANUAL THERAPY             CPA mobilizations L1/L2- L5/S1 JG, grade IV JG, grade IV JG, grade IV JG, grade IV KG, grade IV JG, grade IV JG, grade IV                                                                       THERAPEUTIC EXERCISE             NuStep with towel roll to improve LE strength/endurance 10 mins, L4 NV 10 mins, L5 10 mins, L5 10 mins, L5 10 mins, L5 10 mins, L5 10 mins, L5                   Standing:             - lumbar extension at high mat 2 x 10 NV 3 x 10 3 x 10  3 x 10 3x10 3 x 10       - hip extension 2 x 10 ea NV 1 x 10 ea 1 x 10 ea 1 x 10 ea 1x15 ea 2 x 10 ea       - hip abduction 2 x 10 ea NV 1 x 10 ea 1 x 10 ea 1 x 10 ea 1x15  "ea 2 x 10 ea                    Prone:             - props 60\" x 2 NV 60\" x 2 60\" x 2 60\" x 2 2 mins 60\" x 2 2 mins      - laying on wedge with 2 pillows 5 mins NV 5 mins 5 mins 5 mins  5 mins       - press ups  2 x 10 2 x 10 2 x 10 2x10 2 x 10 2 x 10                                NEUROMUSCULAR REEDUCATION              Prone:             - alt UE lifts 5  x 5\" ea NV 5  x 5\" ea  5 x 5\" ea 5 x 5\" ea         - alt LE lifts 5 x 5\" ea NV 5  x 5\" ea 5 x 5\" ea 5 x 5\" ea         - alt UE/LE lifts     5\" x10 ea 5\" x 10 ea 5\" x 10 ea      Supine:             - PPT 20 x 5\" NV            - TA 20 x 5\" NV 20 x 5\" 20 x 5\" 20 x 5\" 20 x5\"         - TA with alt march 10 x 5\" ea NV    10x ea 5\"         - TA with bent knee fall out 10 x 5\" ea NV                         Standing (maintaining TA):             - rows       10 x 5\" RTB NV      - pulldowns       10 x 5\" RTB NV      - thoracic antirotation       5 x 5\" ea RTB NV                                                                                                 THERAPEUTIC ACTIVITY             Front step ups             Lateral step ups             Mini squats                                                                                                                                                                                      GAIT TRAINING                                                                              MODALITIES             MH/cryo as needed                                             "

## 2025-01-27 ENCOUNTER — OFFICE VISIT (OUTPATIENT)
Dept: FAMILY MEDICINE CLINIC | Facility: CLINIC | Age: 30
End: 2025-01-27
Payer: COMMERCIAL

## 2025-01-27 ENCOUNTER — OFFICE VISIT (OUTPATIENT)
Dept: PHYSICAL THERAPY | Facility: CLINIC | Age: 30
End: 2025-01-27
Payer: COMMERCIAL

## 2025-01-27 VITALS
DIASTOLIC BLOOD PRESSURE: 68 MMHG | HEIGHT: 66 IN | BODY MASS INDEX: 21.98 KG/M2 | WEIGHT: 136.8 LBS | OXYGEN SATURATION: 97 % | HEART RATE: 70 BPM | TEMPERATURE: 97.9 F | SYSTOLIC BLOOD PRESSURE: 112 MMHG

## 2025-01-27 DIAGNOSIS — M54.41 ACUTE RIGHT-SIDED LOW BACK PAIN WITH RIGHT-SIDED SCIATICA: Primary | ICD-10-CM

## 2025-01-27 DIAGNOSIS — M54.31 SCIATICA OF RIGHT SIDE: Primary | ICD-10-CM

## 2025-01-27 DIAGNOSIS — M54.31 SCIATICA OF RIGHT SIDE: ICD-10-CM

## 2025-01-27 DIAGNOSIS — M54.41 ACUTE RIGHT-SIDED LOW BACK PAIN WITH RIGHT-SIDED SCIATICA: ICD-10-CM

## 2025-01-27 PROCEDURE — 97112 NEUROMUSCULAR REEDUCATION: CPT | Performed by: PHYSICAL THERAPIST

## 2025-01-27 PROCEDURE — 97140 MANUAL THERAPY 1/> REGIONS: CPT | Performed by: PHYSICAL THERAPIST

## 2025-01-27 PROCEDURE — 99213 OFFICE O/P EST LOW 20 MIN: CPT | Performed by: NURSE PRACTITIONER

## 2025-01-27 NOTE — PROGRESS NOTES
"Name: Andrey Catalan      : 1995      MRN: [id number]  Encounter Provider: SIGIFREDO Phillips  Encounter Date: 2025   Encounter department: [facility]  :  Assessment & Plan  Sciatica of right side  Per PT patient is demonstrating symptoms of herniated disc.  Would like to check an MRI since the x-ray did not demonstrate          Acute right-sided low back pain with right-sided sciatica         Sciatica       History of Present Illness   Patient is here for follow-up on back pain.  Has been attending PT for the last 6 weeks.  PT note states he is improving but still has some soreness and they think he would benefit from continued physical therapy.  Pat does feel that the naproxen helps him that time the pain usually returns when he is not taking it.  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  He could not get up from trying to change diaper on the floor.  I did encouraged him to try and do that on a tabletop        Review of Systems    Objective   /68 (BP Location: Left arm, Patient Position: Sitting, Cuff Size: Adult)   Pulse 70   Temp 97.9 °F (36.6 °C) (Tympanic)   Ht 5' 6\" (1.676 m)   Wt 62.1 kg (136 lb 12.8 oz)   SpO2 97%   BMI 22.08 kg/m²      Physical Exam  Cardiovascular:      Rate and Rhythm: Normal rate and regular rhythm.   Musculoskeletal:      Comments: Pain with forward bending position change at times he can sit comfortably still but it really limits his ability to do anything.  Does not have any apparent weakness in lower extremities.  +1 patellar reflexes positive straight leg raise         "

## 2025-01-27 NOTE — PROGRESS NOTES
Daily Note     Today's date: 2025  Patient name: Andrey Catalan  : 1995  MRN: [id number]  Referring provider: Tonia Jacobo CRNP  Dx:   Encounter Diagnosis     ICD-10-CM    1. Acute right-sided low back pain with right-sided sciatica  M54.41       2. Sciatica of right side  M54.31                      Subjective: Pt reports his leg pain is getting less and less consistent; however, it still occurs. Today, pt reports increased discomfort in lower back.       Objective: See treatment diary below      Assessment: Progressed exercises this session as charted to enhance core strength and stability. Pt reported improvement in distal symptoms with repeated extension in standing and extension in lying; however, stiffness remained in lumbar spine. With addition of therapist OP to prone press ups, stiffness further reduced in lumbar spine. Pt left session with abolishment of LE symptoms and reduced lumbar tension. Educated pt to perform standing lumbar extensions every 2 hours 3 x 10 or more often as symptoms arise. Pt verbalized understanding. Tolerated treatment well. Patient demonstrated fatigue post treatment, exhibited good technique with therapeutic exercises, and would benefit from continued PT      Plan: Progress treatment as tolerated.       Precautions: Chronicity of symptoms, anxiety, manic behavior, bipolar  CO-MORBIDITIES:  HEP ACCESS CODE: Access Code: [email or web address]  (Updated: 24)  FOTO Completed On: 24    POC Expires Reeval for Medicare to be completed Unit LImit Auth Expiration Date PT/OT/STVisit Limit   25 By visit  10 unknown NA no    Completed on                  TREATMENT DIARY  Auth Status DATE  (IE) 1-6 1-9 1-13 1-16 1-20 -     NA Visit # 1 2 3 4 5 6 7 8      Remaining 9 8 7 6 5 4 3 2     MANUAL THERAPY             CPA mobilizations L1/L2- L5/S1 JG, grade IV JG, grade IV JG, grade IV JG, grade IV KG, grade IV JG, grade IV JG, grade IV JG, Grade IV              "                                                         THERAPEUTIC EXERCISE             NuStep with towel roll to improve LE strength/endurance 10 mins, L4 NV 10 mins, L5 10 mins, L5 10 mins, L5 10 mins, L5 10 mins, L5 10 mins, L5 10 mins, L5                  Standing:             - lumbar extension at high mat 2 x 10 NV 3 x 10 3 x 10  3 x 10 3x10 3 x 10  3 x 10     - hip extension 2 x 10 ea NV 1 x 10 ea 1 x 10 ea 1 x 10 ea 1x15 ea 2 x 10 ea       - hip abduction 2 x 10 ea NV 1 x 10 ea 1 x 10 ea 1 x 10 ea 1x15 ea 2 x 10 ea                    Prone:             - props 60\" x 2 NV 60\" x 2 60\" x 2 60\" x 2 2 mins 60\" x 2 2 mins 2 mins     - laying on wedge with 2 pillows 5 mins NV 5 mins 5 mins 5 mins  5 mins  5 mins     - press ups  2 x 10 2 x 10 2 x 10 2x10 2 x 10 2 x 10 2 x 10     - press ups with therapist OP        1 x 10                  NEUROMUSCULAR REEDUCATION              Prone:             - alt UE lifts 5  x 5\" ea NV 5  x 5\" ea  5 x 5\" ea 5 x 5\" ea         - alt LE lifts 5 x 5\" ea NV 5  x 5\" ea 5 x 5\" ea 5 x 5\" ea         - alt UE/LE lifts     5\" x10 ea 5\" x 10 ea 5\" x 10 ea 10 x 5\" ea     Supine:             - PPT 20 x 5\" NV            - TA 20 x 5\" NV 20 x 5\" 20 x 5\" 20 x 5\" 20 x5\"         - TA with alt march 10 x 5\" ea NV    10x ea 5\"         - TA with bent knee fall out 10 x 5\" ea NV                         Standing (maintaining TA):             - rows       10 x 5\" RTB NV 10 x 5\" RTB     - pulldowns       10 x 5\" RTB NV 10 x 5\" RTB     - thoracic antirotation       5 x 5\" ea RTB NV 5 x 5\" ea RTB                                                                                                THERAPEUTIC ACTIVITY             Front step ups             Lateral step ups             Mini squats                                                    GAIT TRAINING                                                                              MODALITIES             MH/cryo as needed                                * 32 " minutes 1:1 time this date.

## 2025-01-30 ENCOUNTER — EVALUATION (OUTPATIENT)
Dept: PHYSICAL THERAPY | Facility: CLINIC | Age: 30
End: 2025-01-30
Payer: COMMERCIAL

## 2025-01-30 DIAGNOSIS — M54.31 SCIATICA OF RIGHT SIDE: ICD-10-CM

## 2025-01-30 DIAGNOSIS — M54.41 ACUTE RIGHT-SIDED LOW BACK PAIN WITH RIGHT-SIDED SCIATICA: Primary | ICD-10-CM

## 2025-01-30 PROCEDURE — 97112 NEUROMUSCULAR REEDUCATION: CPT | Performed by: PHYSICAL THERAPIST

## 2025-01-30 PROCEDURE — 97140 MANUAL THERAPY 1/> REGIONS: CPT | Performed by: PHYSICAL THERAPIST

## 2025-01-30 PROCEDURE — 97110 THERAPEUTIC EXERCISES: CPT | Performed by: PHYSICAL THERAPIST

## 2025-01-30 NOTE — PROGRESS NOTES
PT Re-Evaluation     Today's date: 2025  Patient name: Andrey Catalan  : 1995  MRN: [id number]  Referring provider: Tonia Jacobo CRNP  Dx:   Encounter Diagnosis     ICD-10-CM    1. Acute right-sided low back pain with right-sided sciatica  M54.41       2. Sciatica of right side  M54.31                        Assessment  Symptom irritability: moderate    Assessment details: Andrey Catalan is a pleasant 29 y.o. presenting to physical therapy with MD referral for Sciatica of right side and Acute right-sided low back pain with right-sided sciatica. Pt presents with signs and symptoms suggestive of lumbar disc herniation with peripheralization of symptoms with flexion and centralization of symptoms with extension-biased exercises. Pt presents with normal LE reflexes, intact sensation to light touch, and no myotomal weakness.    RE-EVALUATION 1 (25): Since time of initial evaluation, objectively, pt has made great improvement in lumbar ROM, lower extremity strength and flexibility and gait. Functionally, pt reports he is back to 75% of his premorbid status. Pt can now walk and stand for longer periods of time prior to increase in pain, he can now negotiate the stairs with a reciprocal pattern, and only 2-3 x per week. Although improvements have been made, this pt would continue to benefit from skilled PT services to maximize functional outcome.      Problem list:  Limited lumbar ROM, decreased hip/core strength, limited lower extremity flexibility, abnormal gait, and increased neural tension    Treatment to include: Manual therapy techniques, lumbar ROM, lower extremity/core strengthening, neuromuscular control exercises, balance/proprioception training, extension-biased program, instruction in a comprehensive HEP, and modalities as needed.     This pt would benefit from skilled PT services to address their impairments and functional limitations to maximize functional outcome.     Barriers  to therapy: Chronicity of symptoms, anxiety, manic behavior, bipolar  Understanding of Dx/Px/POC: good     Prognosis: good    Goals  ST. Pt will improve lumbar flexion ROM to at least 45 degrees in 4 weeks. MET  2. Pt will improve lumbar SB left to at least 20 degrees in 4 weeks. MET  3.Pt will no longer present with increased neural tension in 3 weeks.  4. Pt will no longer report increase in radicular symptoms with lumbar flexion in 3 weeks.    LT. Pt will be able to change his daughter's diapers with minimal to no pain in 6 weeks. PARTIALLY MET  2. Pt will be independent in a comprehensive HEP in 6 weeks. PARTIALLY MET  3. Pt will be able to negotiate stairs with a reciprocal pattern at least 90% of the time with no more than mild discomfort in 8 weeks. MET      Plan  Patient would benefit from: skilled physical therapy    Frequency: 2-3x week  Duration in weeks: 6  Plan of Care beginning date: 2025  Plan of Care expiration date: 3/14/2025  Treatment plan discussed with: patient  Plan details: 2-3 x per week for 6-8 weeks.  Lumbar treatment classifications: extension-biased  Start Back Tool: Moderate Risk (6-8 visits)        Subjective Evaluation    History of Present Illness  Mechanism of injury: Pt reports approximately 3 weeks ago, he turned to the left and felt like his back seized up and he could not take a full breath. Pt felt shooting pain down the back of his right thigh. When pt returned home, he could not bend forward, lay or move without significant pain. Pt tried to work the following day, but was unable. Pt went to the ER for assessment and per pt they stated he might have a disc issue. Pt states for the past few years, he has been experiencing lower back pain with flexion. Pt was prescribed a medrol dose pack with some improvement in symptoms as well as a muscle relaxor. Pt reported significant drowsiness with muscle relaxors so he is not taking them.  X-rays of lumbar spine taken at  "the ER revealed \"IMPRESSION: No acute osseous abnormality.\" Pt continues to report soreness in his lower back, but is still limited in lumbar flexion, and lifting, prolonged positions. Pt denies any change in bowel/bladder control or saddle anesthesia.    Premorbid status:  - ADLs: Independent with mild difficulty  - Work: Full time, Full duty ( for [employer]- standing majority of the day)  - Recreation: walking, jogging, active    Current status:  - ADLs/Functional activities:   - Stairs reciprocal pattern with mild pain in lower and right leg (improved)   - Sit to stand with use of BUEs with mild/moderate pain (improved)   - Walking 60 minutes prior to increase in lower back pain  (improved)   - Standing >60 minutes prior to increase in pain (improved)   - Sitting 20-30 minutes prior to increase in pain in lower back (improved)   - Sleeping with 2-3 x per week sleep disturbances due to pain (improved)   - Bending forward to change his 2 year old daughter's diaper with moderate/severe pain (improved)  - Work: Full time, Full duty  for [employer]- standing majority of the day)  - Recreation: walking    RE-EVALUATION 1 (25): Since time of initial evaluation, functionally, pt reports he is back to 75% of his premorbid status. Pt can now walk and stand for longer periods of time prior to increase in pain, he can now negotiate the stairs with a reciprocal pattern, and only 2-3 x per week. Although improvements have been made, this pt would continue to benefit from skilled PT services to maximize functional outcome.  Patient Goals  Patient goals for therapy: decreased pain and increased motion  Patient goal: Be able to care for 2yr old daughter  Pain  Current pain rating: 3  At best pain rating: 3  At worst pain ratin  Location: right side of lower back into right thigh to foot  Quality: dull ache and sharp  Alleviating factors: prone lying, standing, supportive chair " sitting.  Aggravating factors: sitting (laying supine, right sidelying)  Progression: improved      Diagnostic Tests  X-ray: normal  Treatments  Previous treatment: medication  Current treatment: medication and physical therapy        Objective     Palpation     Additional Palpation Details  No significant increase in muscle tension and tenderness noted on IE of lumbar spine musculature.    Neurological Testing     Sensation     Lumbar   Left   Intact: light touch    Right   Intact: light touch    Reflexes   Left   Patellar (L4): normal (2+)  Achilles (S1): normal (2+)    Right   Patellar (L4): normal (2+)  Achilles (S1): normal (2+)    Active Range of Motion     Lumbar   Flexion: 60 (pain into right leg) degrees   Extension: 30 degrees   Left lateral flexion: 25 degrees     Right lateral flexion: 25 degrees     Joint Play     Hypomobile: L1, L2, L3, L4, L5 and S1     Pain: L5 and S1   Mechanical Assessment    Cervical      Thoracic      Lumbar    Standing flexion: repeated movements   Pain location:peripheralized  Pain level: increased  Sitting flexion: sustained positions  Pain location: peripheralized  Pain intensity: worse  Standing extension: repeated movements  Pain location: no change  Pain intensity: better  Pain level: decreased  Lying extension: sustained positions  Pain intensity: better  Pain level: decreased    Strength/Myotome Testing     Lumbar   Left   Heel walk: normal  Toe walk: normal    Right   Heel walk: normal  Toe walk: normal    Left Hip   Planes of Motion   Flexion: 5  External rotation: 4+  Internal rotation: 4+    Right Hip   Planes of Motion   Flexion: 4+  External rotation: 4+  Internal rotation: 4+    Left Knee   Flexion: 5  Extension: 5    Right Knee   Flexion: 4+  Extension: 4+    Left Ankle/Foot   Dorsiflexion: 5  Plantar flexion: 5  Great toe extension: 5    Right Ankle/Foot   Dorsiflexion: 5  Plantar flexion: 5  Great toe extension: 5    Additional Strength Details  Flexibility:  -  "HS: moderate restriction on the L, moderate on the R  - Hip IR: mod restriction on the L, moderate on the R  - Hip ER: minimal restriction on the L, mild on the R  - Hip Flexion: minimal restriction B    Gait: Pt ambulates over level surface at a slow pace with a mildly antalgic pattern     Tests     Lumbar     Left   Negative passive SLR and slump test.     Right   Positive passive SLR and slump test.           Precautions: Chronicity of symptoms, anxiety, manic behavior, bipolar  CO-MORBIDITIES:  HEP ACCESS CODE: Access Code: [email or web address]  (Updated: 12-30-24)  FOTO Completed On: 12-30-24     POC Expires Reeval for Medicare to be completed Unit [facility] Auth Expiration Date PT/OT/STVisit Limit   3-14-25 By visit  19 unknown NA no     Completed on                            TREATMENT DIARY  Auth Status DATE 12-30 (IE) 1-6 1-9 1-13 1-16 1-20 1-23 1-27 1-30 (RE)       NA Visit # 1 2 3 4 5 6 7 8  9       Remaining (until ideal 10 visit RE) 9 8 7 6 5 4 3 2  1     MANUAL THERAPY                       CPA mobilizations L1/L2- L5/S1 JG, grade IV JG, grade IV JG, grade IV JG, grade IV KG, grade IV JG, grade IV JG, grade IV JG, Grade IV  JG, Grade IV                                                                                                                             THERAPEUTIC EXERCISE                       NuStep with towel roll to improve LE strength/endurance 10 mins, L4 NV 10 mins, L5 10 mins, L5 10 mins, L5 10 mins, L5 10 mins, L5 10 mins, L5 10 mins, L5  10 mins, L5                             Standing:                       - lumbar extension at high mat 2 x 10 NV 3 x 10 3 x 10  3 x 10 3x10 3 x 10   3 x 10  NV     - hip extension 2 x 10 ea NV 1 x 10 ea 1 x 10 ea 1 x 10 ea 1x15 ea 2 x 10 ea      NV with Red TB     - hip abduction 2 x 10 ea NV 1 x 10 ea 1 x 10 ea 1 x 10 ea 1x15 ea 2 x 10 ea      NV with Red TB                             Prone:                       - props 60\" x 2 NV 60\" x 2 60\" x 2 60\" x 2 2 mins 60\" " "x 2 2 mins 2 mins  DC     - laying on wedge with 2 pillows 5 mins NV 5 mins 5 mins 5 mins   5 mins   5 mins  5 mins     - press ups   2 x 10 2 x 10 2 x 10 2x10 2 x 10 2 x 10 2 x 10       - press ups with therapist OP               1 x 10  2 x 10                  Table:             - SL hip ER         20 x 5\" ea RTB NV    - bridges with TB around knees         20 x 5\" RTB NV                                         NEUROMUSCULAR REEDUCATION                        Prone:                       - alt UE lifts 5  x 5\" ea NV 5  x 5\" ea  5 x 5\" ea 5 x 5\" ea               - alt LE lifts 5 x 5\" ea NV 5  x 5\" ea 5 x 5\" ea 5 x 5\" ea               - alt UE/LE lifts         5\" x10 ea 5\" x 10 ea 5\" x 10 ea 10 x 5\" ea  15 x 5\" ea     Supine:                       - PPT 20 x 5\" NV                     - TA 20 x 5\" NV 20 x 5\" 20 x 5\" 20 x 5\" 20 x5\"              - TA with alt march 10 x 5\" ea NV       10x ea 5\"              - TA with bent knee fall out 10 x 5\" ea NV                                             Standing (maintaining TA):                       - rows             10 x 5\" RTB NV 10 x 5\" RTB  10 x 5\" RTB     - pulldowns             10 x 5\" RTB NV 10 x 5\" RTB  10 x 5\" RTB     - thoracic antirotation             5 x 5\" ea RTB NV 5 x 5\" ea RTB  10 x 5\" ea RTB                                                                                                                                                                             THERAPEUTIC ACTIVITY                       Front step ups                       Lateral step ups                       Mini squats                                                                                               GAIT TRAINING                                                                                                                                               MODALITIES                       MH/cryo as needed                                                            "

## 2025-02-03 ENCOUNTER — OFFICE VISIT (OUTPATIENT)
Dept: PHYSICAL THERAPY | Facility: CLINIC | Age: 30
End: 2025-02-03
Payer: COMMERCIAL

## 2025-02-03 DIAGNOSIS — M54.41 ACUTE RIGHT-SIDED LOW BACK PAIN WITH RIGHT-SIDED SCIATICA: Primary | ICD-10-CM

## 2025-02-03 DIAGNOSIS — M54.31 SCIATICA OF RIGHT SIDE: ICD-10-CM

## 2025-02-03 PROCEDURE — 97112 NEUROMUSCULAR REEDUCATION: CPT | Performed by: PHYSICAL THERAPIST

## 2025-02-03 PROCEDURE — 97110 THERAPEUTIC EXERCISES: CPT | Performed by: PHYSICAL THERAPIST

## 2025-02-03 PROCEDURE — 97140 MANUAL THERAPY 1/> REGIONS: CPT | Performed by: PHYSICAL THERAPIST

## 2025-02-03 NOTE — PROGRESS NOTES
Daily Note     Today's date: 2/3/2025  Patient name: Andrey Catalan  : 1995  MRN: [id number]  Referring provider: Tonia Jacobo CRNP  Dx:   Encounter Diagnosis     ICD-10-CM    1. Acute right-sided low back pain with right-sided sciatica  M54.41       2. Sciatica of right side  M54.31                      Subjective: Pt reports he is able to notice large improvements since starting skilled PT. He has been complaint with his HEP     Objective: See treatment diary below      Assessment: Tolerated treatment well. Patient exhibited good technique with therapeutic exercises and would benefit from continued PT      Plan: Continue per plan of care.      Precautions: Chronicity of symptoms, anxiety, manic behavior, bipolar  CO-MORBIDITIES:  HEP ACCESS CODE: Access Code: [email or web address]  (Updated: 24)  FOTO Completed On: 24     POC Expires Reeval for Medicare to be completed Unit [facility] Auth Expiration Date PT/OT/STVisit Limit   3-14-25 By visit  19 unknown NA no     Completed on                            TREATMENT DIARY  Auth Status DATE  (IE) 1-6 1-9 1-13 1-16 1-20 1-23 1-27  1-30 (RE)    2/3   NA Visit # 1 2 3 4 5 6 7 8  9  1     Remaining (until ideal 10 visit RE) 9 8 7 6 5 4 3 2  1  9   MANUAL THERAPY                       CPA mobilizations L1/L2- L5/S1 JG, grade IV JG, grade IV JG, grade IV JG, grade IV KG, grade IV JG, grade IV JG, grade IV JG, Grade IV  JG, Grade IV  RR GR 4                                                                                                                            THERAPEUTIC EXERCISE                       NuStep with towel roll to improve LE strength/endurance 10 mins, L4 NV 10 mins, L5 10 mins, L5 10 mins, L5 10 mins, L5 10 mins, L5 10 mins, L5 10 mins, L5  10 mins, L5  10 mins, L5                           Standing:                       - lumbar extension at high mat 2 x 10 NV 3 x 10 3 x 10  3 x 10 3x10 3 x 10   3 x 10  NV     - hip extension 2 x 10 ea NV  "1 x 10 ea 1 x 10 ea 1 x 10 ea 1x15 ea 2 x 10 ea      NV with Red TB  [id number]   - hip abduction 2 x 10 ea NV 1 x 10 ea 1 x 10 ea 1 x 10 ea 1x15 ea 2 x 10 ea      NV with Red TB  2x10  green                           Prone:                       - props 60\" x 2 NV 60\" x 2 60\" x 2 60\" x 2 2 mins 60\" x 2 2 mins 2 mins  DC     - laying on wedge with 2 pillows 5 mins NV 5 mins 5 mins 5 mins   5 mins   5 mins  5 mins  5 min    - press ups   2 x 10 2 x 10 2 x 10 2x10 2 x 10 2 x 10 2 x 10       - press ups with therapist OP               1 x 10  2 x 10  2x10                Table:             - SL hip ER         20 x 5\" ea RTB NV 20x 5''green    - bridges with TB around knees         20 x 5\" RTB NV 5''20x green                                        NEUROMUSCULAR REEDUCATION                        Prone:                       - alt UE lifts 5  x 5\" ea NV 5  x 5\" ea  5 x 5\" ea 5 x 5\" ea               - alt LE lifts 5 x 5\" ea NV 5  x 5\" ea 5 x 5\" ea 5 x 5\" ea               - alt UE/LE lifts         5\" x10 ea 5\" x 10 ea 5\" x 10 ea 10 x 5\" ea  15 x 5\" ea  5''20x   Supine:                       - PPT 20 x 5\" NV                     - TA 20 x 5\" NV 20 x 5\" 20 x 5\" 20 x 5\" 20 x5\"              - TA with alt march 10 x 5\" ea NV       10x ea 5\"              - TA with bent knee fall out 10 x 5\" ea NV                                             Standing (maintaining TA):                       - rows             10 x 5\" RTB NV 10 x 5\" RTB  10 x 5\" RTB   10 x 5\" GTB   - pulldowns             10 x 5\" RTB NV 10 x 5\" RTB  10 x 5\" RTB  10 x 5\" GTB   - thoracic antirotation             5 x 5\" ea RTB NV 5 x 5\" ea RTB  10 x 5\" ea RTB  10 x 5\" ea GTB                                                                                                                                                                           THERAPEUTIC ACTIVITY                       Front step ups                       Lateral step ups                       Mini squats  "                                                                                              GAIT TRAINING                                                                                                                                               MODALITIES                       MH/cryo as needed

## 2025-02-05 ENCOUNTER — APPOINTMENT (OUTPATIENT)
Dept: PHYSICAL THERAPY | Facility: CLINIC | Age: 30
End: 2025-02-05
Payer: COMMERCIAL

## 2025-02-05 NOTE — PROGRESS NOTES
"Daily Note     Today's date: 2025  Patient name: Andrey Catalan  : 1995  MRN: [id number]  Referring provider: Tonia Jacobo CRNP  Dx: No diagnosis found.               Subjective: ***      Objective: See treatment diary below      Assessment: Tolerated treatment {Tolerated treatment :}. Patient {assessment:[id number]}      Plan: {PLAN:[id number]}     Precautions: Chronicity of symptoms, anxiety, manic behavior, bipolar  CO-MORBIDITIES:  HEP ACCESS CODE: Access Code: [email or web address]  (Updated: 24)  FOTO Completed On: 24     POC Expires Reeval for Medicare to be completed Unit [facility]mit Auth Expiration Date PT/OT/STVisit Limit   3-14-25 By visit  19 unknown NA no     Completed on                            TREATMENT DIARY  Auth Status DATE  (IE) 1-6 1-9 1-13 1-16 1-20 1- 1- (RE)    2/3   NA Visit # 1 2 3 4 5 6 7 8  9  1     Remaining (until ideal 10 visit RE) 9 8 7 6 5 4 3 2  1  9   MANUAL THERAPY                       CPA mobilizations L1/L2- L5/S1 JG, grade IV JG, grade IV JG, grade IV JG, grade IV KG, grade IV JG, grade IV JG, grade IV JG, Grade IV  JG, Grade IV  RR GR 4                                                                                                                            THERAPEUTIC EXERCISE                       NuStep with towel roll to improve LE strength/endurance 10 mins, L4 NV 10 mins, L5 10 mins, L5 10 mins, L5 10 mins, L5 10 mins, L5 10 mins, L5 10 mins, L5  10 mins, L5  10 mins, L5                           Standing:                       - lumbar extension at high mat 2 x 10 NV 3 x 10 3 x 10  3 x 10 3x10 3 x 10   3 x 10  NV     - hip extension 2 x 10 ea NV 1 x 10 ea 1 x 10 ea 1 x 10 ea 1x15 ea 2 x 10 ea      NV with Red TB  [id number]   - hip abduction 2 x 10 ea NV 1 x 10 ea 1 x 10 ea 1 x 10 ea 1x15 ea 2 x 10 ea      NV with Red TB  2x10  green                           Prone:                       - props 60\" x 2 NV 60\" x 2 60\" x 2 60\" x 2 " "2 mins 60\" x 2 2 mins 2 mins  DC     - laying on wedge with 2 pillows 5 mins NV 5 mins 5 mins 5 mins   5 mins   5 mins  5 mins  5 min    - press ups   2 x 10 2 x 10 2 x 10 2x10 2 x 10 2 x 10 2 x 10       - press ups with therapist OP               1 x 10  2 x 10  2x10                Table:             - SL hip ER         20 x 5\" ea RTB NV 20x 5''green    - bridges with TB around knees         20 x 5\" RTB NV 5''20x green                                        NEUROMUSCULAR REEDUCATION                        Prone:                       - alt UE lifts 5  x 5\" ea NV 5  x 5\" ea  5 x 5\" ea 5 x 5\" ea               - alt LE lifts 5 x 5\" ea NV 5  x 5\" ea 5 x 5\" ea 5 x 5\" ea               - alt UE/LE lifts         5\" x10 ea 5\" x 10 ea 5\" x 10 ea 10 x 5\" ea  15 x 5\" ea  5''20x   Supine:                       - PPT 20 x 5\" NV                     - TA 20 x 5\" NV 20 x 5\" 20 x 5\" 20 x 5\" 20 x5\"              - TA with alt march 10 x 5\" ea NV       10x ea 5\"              - TA with bent knee fall out 10 x 5\" ea NV                                             Standing (maintaining TA):                       - rows             10 x 5\" RTB NV 10 x 5\" RTB  10 x 5\" RTB   10 x 5\" GTB   - pulldowns             10 x 5\" RTB NV 10 x 5\" RTB  10 x 5\" RTB  10 x 5\" GTB   - thoracic antirotation             5 x 5\" ea RTB NV 5 x 5\" ea RTB  10 x 5\" ea RTB  10 x 5\" ea GTB                                                                                                                                                                           THERAPEUTIC ACTIVITY                       Front step ups                       Lateral step ups                       Mini squats                                                                                               GAIT TRAINING                                                                                                                                               MODALITIES                     "   MH/cryo as needed

## 2025-02-10 ENCOUNTER — OFFICE VISIT (OUTPATIENT)
Dept: PHYSICAL THERAPY | Facility: CLINIC | Age: 30
End: 2025-02-10
Payer: COMMERCIAL

## 2025-02-10 DIAGNOSIS — M54.31 SCIATICA OF RIGHT SIDE: ICD-10-CM

## 2025-02-10 DIAGNOSIS — M54.41 ACUTE RIGHT-SIDED LOW BACK PAIN WITH RIGHT-SIDED SCIATICA: Primary | ICD-10-CM

## 2025-02-10 PROCEDURE — 97112 NEUROMUSCULAR REEDUCATION: CPT | Performed by: PHYSICAL THERAPIST

## 2025-02-10 PROCEDURE — 97140 MANUAL THERAPY 1/> REGIONS: CPT | Performed by: PHYSICAL THERAPIST

## 2025-02-10 NOTE — PROGRESS NOTES
Daily Note     Today's date: 2/10/2025  Patient name: Andrey Catalan  : 1995  MRN: [id number]  Referring provider: Tonia Jacobo CRNP  Dx:   Encounter Diagnosis     ICD-10-CM    1. Acute right-sided low back pain with right-sided sciatica  M54.41       2. Sciatica of right side  M54.31                      Subjective: Pt reports he has been compliant with his HEP frequency and feels he is just about back to his pre-injury self.       Objective: See treatment diary below      Assessment: Progressed exercises this session as charted to enhance functional strength and balance. Tolerated treatment well. Patient demonstrated fatigue post treatment, exhibited good technique with therapeutic exercises, and would benefit from continued PT. Educated pt continue to focus on extension-biased exercises at home and to maintain the frequency of every 2 hours.       Plan: Progress treatment as tolerated.       Precautions: Chronicity of symptoms, anxiety, manic behavior, bipolar  CO-MORBIDITIES:  HEP ACCESS CODE: Access Code: [email or web address]  (Updated: 24)  FOTO Completed On: 24     POC Expires Reeval for Medicare to be completed Unit LImit Auth Expiration Date PT/OT/STVisit Limit   3-14-25 By visit  19 unknown NA no     Completed on                            TREATMENT DIARY  Auth Status DATE 2-10  1-9 1-13 1-16 1- 1- 1-30 (RE)    2/3   NA Visit # 11  3 4 5 6 7 8  9  1     Remaining (until ideal 10 visit RE) 8  7 6 5 4 3 2  1  9   MANUAL THERAPY                     CPA mobilizations L1/L2- L5/S1 JG, Grade IV  JG, grade IV JG, grade IV KG, grade IV JG, grade IV JG, grade IV JG, Grade IV  JG, Grade IV  RR GR 4                                                                                                                  THERAPEUTIC EXERCISE                     NuStep with towel roll to improve LE strength/endurance  10 mins, L5  10 mins, L5 10 mins, L5 10 mins, L5 10 mins, L5 10 mins, L5 10 mins, L5   "10 mins, L5  10 mins, L5                         Standing:                     - lumbar extension at high mat   3 x 10  3 x 10 3x10 3 x 10   3 x 10  NV     - hip extension 2 x 10 ea GTB  1 x 10 ea 1 x 10 ea 1x15 ea 2 x 10 ea      NV with Red TB  [id number]   - hip abduction 2 x 10 ea GTB  1 x 10 ea 1 x 10 ea 1x15 ea 2 x 10 ea      NV with Red TB  2x10  green                         Prone:                     - props   60\" x 2 60\" x 2 2 mins 60\" x 2 2 mins 2 mins  DC     - laying on wedge with 2 pillows DC to HEP  5 mins 5 mins   5 mins   5 mins  5 mins  5 min    - press ups 2 x 10  2 x 10 2 x 10 2x10 2 x 10 2 x 10 2 x 10       - press ups with therapist OP Not needed            1 x 10  2 x 10  2x10                Table:             - SL hip ER 20 x 5\" ea GTB         20 x 5\" ea RTB NV 20x 5''green    - bridges with TB around knees 20 x 5\" ea GTB         20 x 5\" RTB NV 5''20x green                                      NEUROMUSCULAR REEDUCATION                      Prone:                     - alt UE lifts   5 x 5\" ea 5 x 5\" ea               - alt LE lifts   5 x 5\" ea 5 x 5\" ea               - alt UE/LE lifts 20 x 5\" Ea 1#      5\" x10 ea 5\" x 10 ea 5\" x 10 ea 10 x 5\" ea  15 x 5\" ea  5''20x   Supine:                     - PPT                     - TA   20 x 5\" 20 x 5\" 20 x5\"              - TA with alt march       10x ea 5\"              - TA with bent knee fall out                                           Standing (maintaining TA):                     - rows NV          10 x 5\" RTB NV 10 x 5\" RTB  10 x 5\" RTB   10 x 5\" GTB   - pulldowns NV          10 x 5\" RTB NV 10 x 5\" RTB  10 x 5\" RTB  10 x 5\" GTB   - thoracic antirotation NV          5 x 5\" ea RTB NV 5 x 5\" ea RTB  10 x 5\" ea RTB  10 x 5\" ea GTB                                                                                                                                                             THERAPEUTIC ACTIVITY                     Front step ups 2 x 10 ea 6\"  " "                   Lateral step ups 2 x 10 ea 6\"                     Mini squats 2 x 10                                                                                      GAIT TRAINING                                                                                                                                   MODALITIES                     MH/cryo as needed                                                  * 23 minutes  1:1 time this date.  "

## 2025-02-12 ENCOUNTER — OFFICE VISIT (OUTPATIENT)
Dept: PHYSICAL THERAPY | Facility: CLINIC | Age: 30
End: 2025-02-12
Payer: COMMERCIAL

## 2025-02-12 DIAGNOSIS — M54.31 SCIATICA OF RIGHT SIDE: ICD-10-CM

## 2025-02-12 DIAGNOSIS — M54.41 ACUTE RIGHT-SIDED LOW BACK PAIN WITH RIGHT-SIDED SCIATICA: Primary | ICD-10-CM

## 2025-02-12 PROCEDURE — 97140 MANUAL THERAPY 1/> REGIONS: CPT | Performed by: PHYSICAL THERAPIST

## 2025-02-12 PROCEDURE — 97112 NEUROMUSCULAR REEDUCATION: CPT | Performed by: PHYSICAL THERAPIST

## 2025-02-12 NOTE — PROGRESS NOTES
Daily Note     Today's date: 2025  Patient name: Andrey Catalan  : 1995  MRN: [id number]  Referring provider: Tonia Jacobo CRNP  Dx:   Encounter Diagnosis     ICD-10-CM    1. Acute right-sided low back pain with right-sided sciatica  M54.41       2. Sciatica of right side  M54.31                      Subjective: Pt reports he is doing well today      Objective: See treatment diary below      Assessment: Tolerated treatment well. Patient exhibited good technique with therapeutic exercises and would benefit from continued PT      Plan: Continue per plan of care.      Precautions: Chronicity of symptoms, anxiety, manic behavior, bipolar  CO-MORBIDITIES:  HEP ACCESS CODE: Access Code: [email or web address]  (Updated: 24)  FOTO Completed On: 24     POC Expires Reeval for Medicare to be completed Unit LImit Auth Expiration Date PT/OT/STVisit Limit   3-14-25 By visit  19 unknown NA no     Completed on                            TREATMENT DIARY  Auth Status DATE 2-10  2/12 1- (RE)    2/3   NA Visit # 11  3 4 5 6 7 8  9  1     Remaining (until ideal 10 visit RE) 8  7 6 5 4 3 2  1  9   MANUAL THERAPY                     CPA mobilizations L1/L2- L5/S1 JG, Grade IV  JG, grade IV JG, grade IV KG, grade IV JG, grade IV JG, grade IV JG, Grade IV  JG, Grade IV  RR GR 4                                                                                                                  THERAPEUTIC EXERCISE                     NuStep with towel roll to improve LE strength/endurance  10 mins, L5  10 mins, L5 10 mins, L5 10 mins, L5 10 mins, L5 10 mins, L5 10 mins, L5  10 mins, L5  10 mins, L5                         Standing:                     - lumbar extension at high mat   3 x 10  3 x 10 3x10 3 x 10   3 x 10  NV     - hip extension 2 x 10 ea GTB  1 x 10 ea 1 x 10 ea 1x15 ea 2 x 10 ea      NV with Red TB  [id number]   - hip abduction 2 x 10 ea GTB  1 x 10 ea 1 x 10 ea 1x15 ea 2 x 10 ea     "  NV with Red TB  2x10  green                         Prone:                     - props   60\" x 2 60\" x 2 2 mins 60\" x 2 2 mins 2 mins  DC     - laying on wedge with 2 pillows DC to HEP  5 mins 5 mins   5 mins   5 mins  5 mins  5 min    - press ups 2 x 10  2 x 10 2 x 10 2x10 2 x 10 2 x 10 2 x 10       - press ups with therapist OP Not needed            1 x 10  2 x 10  2x10                Table:             - SL hip ER 20 x 5\" ea GTB   20 x 5\" ea BTB       20 x 5\" ea RTB NV 20x 5''green    - bridges with TB around knees 20 x 5\" ea GTB   20 x 5\" ea BTB       20 x 5\" RTB NV 5''20x green                                      NEUROMUSCULAR REEDUCATION                      Prone:                     - alt UE lifts   5 x 5\" ea 5 x 5\" ea               - alt LE lifts   5 x 5\" ea 5 x 5\" ea               - alt UE/LE lifts 20 x 5\" Ea 1#      5\" x10 ea 5\" x 10 ea 5\" x 10 ea 10 x 5\" ea  15 x 5\" ea  5''20x   Supine:                     - PPT                     - TA   20 x 5\" 20 x 5\" 20 x5\"              - TA with alt march       10x ea 5\"              - TA with bent knee fall out                                           Standing (maintaining TA):                     - rows NV   10 x 5\" GTB       10 x 5\" RTB NV 10 x 5\" RTB  10 x 5\" RTB   10 x 5\" GTB   - pulldowns NV   10 x 5\" GTB       10 x 5\" RTB NV 10 x 5\" RTB  10 x 5\" RTB  10 x 5\" GTB   - thoracic antirotation NV   10 x 5\" ea GTB       5 x 5\" ea RTB NV 5 x 5\" ea RTB  10 x 5\" ea RTB  10 x 5\" ea GTB                                                                                                                                                             THERAPEUTIC ACTIVITY                     Front step ups 2 x 10 ea 6\"   2 x 10 ea 6\"                  Lateral step ups 2 x 10 ea 6\"     2x 10 ea 6\"                 Mini squats 2 x 10   2x10                                                                                   GAIT TRAINING                                                  "                                                                                  MODALITIES                     MH/cryo as needed

## 2025-02-17 ENCOUNTER — HOSPITAL ENCOUNTER (OUTPATIENT)
Dept: MRI IMAGING | Facility: HOSPITAL | Age: 30
Discharge: HOME | End: 2025-02-17
Payer: COMMERCIAL

## 2025-02-17 ENCOUNTER — OFFICE VISIT (OUTPATIENT)
Dept: PHYSICAL THERAPY | Facility: CLINIC | Age: 30
End: 2025-02-17
Payer: COMMERCIAL

## 2025-02-17 DIAGNOSIS — M54.41 ACUTE RIGHT-SIDED LOW BACK PAIN WITH RIGHT-SIDED SCIATICA: ICD-10-CM

## 2025-02-17 DIAGNOSIS — M54.31 SCIATICA OF RIGHT SIDE: ICD-10-CM

## 2025-02-17 DIAGNOSIS — M54.41 ACUTE RIGHT-SIDED LOW BACK PAIN WITH RIGHT-SIDED SCIATICA: Primary | ICD-10-CM

## 2025-02-17 PROCEDURE — 97112 NEUROMUSCULAR REEDUCATION: CPT | Performed by: PHYSICAL THERAPIST

## 2025-02-17 PROCEDURE — 72148 MRI LUMBAR SPINE W/O DYE: CPT

## 2025-02-17 PROCEDURE — 97140 MANUAL THERAPY 1/> REGIONS: CPT | Performed by: PHYSICAL THERAPIST

## 2025-02-17 PROCEDURE — 97530 THERAPEUTIC ACTIVITIES: CPT | Performed by: PHYSICAL THERAPIST

## 2025-02-17 PROCEDURE — 97110 THERAPEUTIC EXERCISES: CPT | Performed by: PHYSICAL THERAPIST

## 2025-02-17 NOTE — PROGRESS NOTES
Daily Note     Today's date: 2025  Patient name: Andrey Catalan  : 1995  MRN: [id number]  Referring provider: Tonia Jacobo CRNP  Dx:   Encounter Diagnosis     ICD-10-CM    1. Acute right-sided low back pain with right-sided sciatica  M54.41       2. Sciatica of right side  M54.31                     Subjective: Patient had MRI today. Reports his leg pain is pretty much gone. He only notices it with frequent stair climbing or with shoveling snow.       Objective: See treatment diary below      Assessment: Tolerated treatment well. Patient is making steady progress toward goals. Symptoms are centralizing. Patient demonstrated fatigue post treatment, exhibited good technique with therapeutic exercises, and would benefit from continued PT      Plan: Continue per plan of care.  Progress treatment as tolerated.       Precautions: Chronicity of symptoms, anxiety, manic behavior, bipolar    CO-MORBIDITIES:  HEP ACCESS CODE: Access Code: [email or web address]  (Updated: 24)  FOTO Completed On: 24     POC Expires Reeval for Medicare to be completed Unit LImit Auth Expiration Date PT/OT/STVisit Limit   3-14-25 By visit  19 unknown NA no     Completed on                            TREATMENT DIARY  Auth Status DATE 2-10  2/12 2/17  1-20 1-23 1-27  1-30 (RE)    2/3   NA Visit # 11  12 13  6 7 8  9  1     Remaining (until ideal 10 visit RE) 8  7 6  4 3 2  1  9   MANUAL THERAPY                   CPA mobilizations L1/L2- L5/S1 JG, Grade IV  JG, grade IV KG,  Grade IV  JG, grade IV JG, grade IV JG, Grade IV  JG, Grade IV  RR GR 4                                                                                                        THERAPEUTIC EXERCISE                   NuStep with towel roll to improve LE strength/endurance  10 mins, L5  10 mins, L5 10 mins,   L5  10 mins, L5 10 mins, L5 10 mins, L5  10 mins, L5  10 mins, L5                       Standing:                   - lumbar extension at high mat   3 x 10  "  3 x 10   3 x 10  NV     - hip extension 2 x 10 ea GTB  1 x 10 ea 2x10 ea w/GTB  2 x 10 ea      NV with Red TB  [id number]   - hip abduction 2 x 10 ea GTB  1 x 10 ea 2x10 ea w/GTB  2 x 10 ea      NV with Red TB  2x10  green                       Prone:                   - props   60\" x 2   60\" x 2 2 mins 2 mins  DC     - laying on wedge with 2 pillows DC to HEP  5 mins   5 mins   5 mins  5 mins  5 min    - press ups 2 x 10  2 x 10 2x10  2 x 10 2 x 10 2 x 10       - press ups with therapist OP Not needed          1 x 10  2 x 10  2x10                Table:             - SL hip ER 20 x 5\" ea GTB   20 x 5\" ea BTB  20x  5\" ea  BTB     20 x 5\" ea RTB NV 20x 5''green    - bridges with TB around knees 20 x 5\" ea GTB   20 x 5\" ea BTB  20x  5\" ea  BTB     20 x 5\" RTB NV 5''20x green                                    NEUROMUSCULAR REEDUCATION                    Prone:                   - alt UE lifts   5 x 5\" ea               - alt LE lifts   5 x 5\" ea               - alt UE/LE lifts 20 x 5\" Ea 1#    1#UE/1#LE  20x5\" ea  5\" x 10 ea 5\" x 10 ea 10 x 5\" ea  15 x 5\" ea  5''20x   Supine:                   - PPT                   - TA   20 x 5\"               - TA with alt march                   - TA with bent knee fall out                                       Standing (maintaining TA):                   - rows NV   10 x 5\" GTB 10x5\"  GTB    10 x 5\" RTB NV 10 x 5\" RTB  10 x 5\" RTB   10 x 5\" GTB   - pulldowns NV   10 x 5\" GTB 10x5\"  GTB    10 x 5\" RTB NV 10 x 5\" RTB  10 x 5\" RTB  10 x 5\" GTB   - thoracic antirotation NV   10 x 5\" ea GTB 10x5\"  GTB    5 x 5\" ea RTB NV 5 x 5\" ea RTB  10 x 5\" ea RTB  10 x 5\" ea GTB                                                                                                                                               THERAPEUTIC ACTIVITY                   Front step ups 2 x 10 ea 6\"   2 x 10 ea 6\"  2x10 ea  6\"               Lateral step ups 2 x 10 ea 6\"     2x 10 ea 6\" 2x10 ea  6\"            "   Mini squats 2 x 10   2x10 2x10                                                                          GAIT TRAINING                                                                                                                       MODALITIES                   MH/cryo as needed

## 2025-02-19 ENCOUNTER — APPOINTMENT (OUTPATIENT)
Dept: PHYSICAL THERAPY | Facility: CLINIC | Age: 30
End: 2025-02-19
Payer: COMMERCIAL

## 2025-02-20 ENCOUNTER — APPOINTMENT (OUTPATIENT)
Dept: PHYSICAL THERAPY | Facility: CLINIC | Age: 30
End: 2025-02-20
Payer: COMMERCIAL

## 2025-02-20 ENCOUNTER — RESULTS FOLLOW-UP (OUTPATIENT)
Dept: FAMILY MEDICINE CLINIC | Facility: CLINIC | Age: 30
End: 2025-02-20

## 2025-02-20 NOTE — RESULT ENCOUNTER NOTE
Notify patient that his MRI of his spine looked looked good there really was not any evidence of impingement or disc issue he had just a little bit of degenerative wearing it on the lumbar spine but it was very mild and it does not seem to him involve any disc.  If he is not improved, let me know thank you

## 2025-02-24 ENCOUNTER — OFFICE VISIT (OUTPATIENT)
Dept: PHYSICAL THERAPY | Facility: CLINIC | Age: 30
End: 2025-02-24
Payer: COMMERCIAL

## 2025-02-24 ENCOUNTER — OFFICE VISIT (OUTPATIENT)
Dept: FAMILY MEDICINE CLINIC | Facility: CLINIC | Age: 30
End: 2025-02-24
Payer: COMMERCIAL

## 2025-02-24 VITALS
WEIGHT: 138 LBS | BODY MASS INDEX: 22.18 KG/M2 | SYSTOLIC BLOOD PRESSURE: 118 MMHG | HEIGHT: 66 IN | DIASTOLIC BLOOD PRESSURE: 66 MMHG | OXYGEN SATURATION: 98 % | HEART RATE: 73 BPM

## 2025-02-24 DIAGNOSIS — M54.31 SCIATICA OF RIGHT SIDE: Primary | ICD-10-CM

## 2025-02-24 DIAGNOSIS — R53.83 OTHER FATIGUE: ICD-10-CM

## 2025-02-24 DIAGNOSIS — M54.41 ACUTE RIGHT-SIDED LOW BACK PAIN WITH RIGHT-SIDED SCIATICA: ICD-10-CM

## 2025-02-24 DIAGNOSIS — M25.50 ARTHRALGIA, UNSPECIFIED JOINT: ICD-10-CM

## 2025-02-24 PROCEDURE — 99213 OFFICE O/P EST LOW 20 MIN: CPT | Performed by: NURSE PRACTITIONER

## 2025-02-24 PROCEDURE — 97112 NEUROMUSCULAR REEDUCATION: CPT | Performed by: PHYSICAL THERAPIST

## 2025-02-24 PROCEDURE — 97110 THERAPEUTIC EXERCISES: CPT | Performed by: PHYSICAL THERAPIST

## 2025-02-24 PROCEDURE — 97140 MANUAL THERAPY 1/> REGIONS: CPT | Performed by: PHYSICAL THERAPIST

## 2025-02-24 RX ORDER — CELECOXIB 200 MG/1
200 CAPSULE ORAL 2 TIMES DAILY
Qty: 60 CAPSULE | Refills: 1 | Status: SHIPPED | OUTPATIENT
Start: 2025-02-24

## 2025-02-24 NOTE — PROGRESS NOTES
Name: Andrey Catalan      : 1995      MRN: [id number]  Encounter Provider: SIGIFREDO Phillips  Encounter Date: 2025   Encounter department: [facility]  :  Assessment & Plan  Sciatica of right side    Orders:  •  celecoxib (CeleBREX) 200 mg capsule; Take 1 capsule (200 mg total) by mouth 2 (two) times a day  •  Ambulatory referral to Spine & Pain Management; Future  He was getting a little bit of dyspepsia from naproxen so I did change him to Celebrex.  He will use it on bad days but not using it daily  Probably not a rheumatological issue but I will check labs for completeness.  I will see if pain management can give us an opinion why he he is having such significant discomfort.  Acute right-sided low back pain with right-sided sciatica    Orders:  •  celecoxib (CeleBREX) 200 mg capsule; Take 1 capsule (200 mg total) by mouth 2 (two) times a day  •  Ambulatory referral to Spine & Pain Management; Future    Other fatigue    Orders:  •  CBC and differential; Future  •  Comprehensive metabolic panel; Future  •  UA w Reflex to Microscopic w Reflex to Culture; Future  •  TSH + Free T4; Future    Arthralgia, unspecified joint    Orders:  •  C-reactive protein; Future  •  KASHIF Screen w/Reflex Cascade; Future  •  Rheumatoid factor quant (Reflex Only- Do Not Order); Future  •  CBC and differential; Future           History of Present Illness   Patient has been following for with started severe back pain for the last month or so.  He continues with physical therapy.  I did get an MRI which did not show any disc issue just 1 level of facet hypertrophy.  He was not sure if maybe his mom has a history of some type of rheumatological arthritis.  I suspect it may just be osteoarthritis and the possibility of a MS as well.  He had been improving but yesterday he awoken with pain it was a 7 on a scale of 1-10.  Occasionally he has knee pain as well although he has not noticed swelling or  "redness.  Occasionally he has fatigue 2.  He blames that on having a 2-year-old in the house.      Review of Systems    Objective   /66 (BP Location: Left arm, Patient Position: Sitting, Cuff Size: Adult)   Pulse 73   Ht 5' 6\" (1.676 m)   Wt 62.6 kg (138 lb)   SpO2 98%   BMI 22.27 kg/m²      Physical Exam  Musculoskeletal:      Comments: Mild pain with motion today.  It is hard for him to predict when it is going to be an issue.   Psychiatric:         Mood and Affect: Mood normal.         "

## 2025-02-24 NOTE — PROGRESS NOTES
Daily Note     Today's date: 2025  Patient name: Andrey Catalan  : 1995  MRN: [id number]  Referring provider: Tonia Jacobo CRNP  Dx:   Encounter Diagnosis     ICD-10-CM    1. Sciatica of right side  M54.31       2. Acute right-sided low back pain with right-sided sciatica  M54.41                      Subjective: Patient reports he woke up yesterday with swelling in his R lower back and a lot of pain. He denies doing anything different to lead to this pain. He saw the doctor today. Got results of MRI and was told he has degenerative changes. He is getting blood work to check for rheumatoid arthritis. They referred him to orthopedic surgery and pain management.       Objective: See treatment diary below      Assessment: Tolerated treatment well despite having increased pain levels this date. Checked SIJ this date with supine to long sit exam. Patient expressed significant pain during testing therefore making results of test difficult to assess. Presented with R leg longer in long sit compared to supine, suggesting R posterior innominate rotation. Attempted MET to correct LLD and no changes were noted. Modified session to avoid exacerbation of pain.   Patient demonstrated fatigue post treatment, exhibited good technique with therapeutic exercises, and would benefit from continued PT. Concluded session with MHP to assist in muscle relaxation and pain relief.       Plan: Continue per plan of care.  Progress treatment as tolerated.       Precautions: Chronicity of symptoms, anxiety, manic behavior, bipolar    CO-MORBIDITIES:  HEP ACCESS CODE: Access Code: [email or web address]  (Updated: 24)  FOTO Completed On: 24     POC Expires Reeval for Medicare to be completed Unit LImit Auth Expiration Date PT/OT/STVisit Limit   3-14-25 By visit  19 unknown NA no     Completed on                            TREATMENT DIARY  Auth Status DATE 2-10  2/12 2/17 2/24  1-23 1-27  1-30 (RE)    2/3   NA Visit # 11  12 13  "14  7 8  9  1     Remaining (until ideal 10 visit RE) 8  7 6 5  3 2  1  9   MANUAL THERAPY                  CPA mobilizations L1/L2- L5/S1 JG, Grade IV  JG, grade IV KG,  Grade IV KG  Gr IV  JG, grade IV JG, Grade IV  JG, Grade IV  RR GR 4                                                                                                   THERAPEUTIC EXERCISE                  NuStep with towel roll to improve LE strength/endurance  10 mins, L5  10 mins, L5 10 mins,   L5 10 mins  L5  10 mins, L5 10 mins, L5  10 mins, L5  10 mins, L5                      Standing:                  - lumbar extension at high mat   3 x 10      3 x 10  NV     - hip extension 2 x 10 ea GTB  1 x 10 ea 2x10 ea w/GTB        NV with Red TB  [id number]   - hip abduction 2 x 10 ea GTB  1 x 10 ea 2x10 ea w/GTB        NV with Red TB  2x10  green                      Prone:                  - props   60\" x 2    2 mins 2 mins  DC     - laying on wedge with 2 pillows DC to HEP  5 mins      5 mins  5 mins  5 min    - press ups 2 x 10  2 x 10 2x10 2x10  2 x 10 2 x 10       - press ups with therapist OP Not needed         1 x 10  2 x 10  2x10                Table:             - SL hip ER 20 x 5\" ea GTB   20 x 5\" ea BTB  20x  5\" ea  BTB 20x 5\" ea  BTB    20 x 5\" ea RTB NV 20x 5''green    - bridges with TB around knees 20 x 5\" ea GTB   20 x 5\" ea BTB  20x  5\" ea  BTB 20x 5\" ea  BTB    20 x 5\" RTB NV 5''20x green                                   NEUROMUSCULAR REEDUCATION                   Prone:                  - alt UE lifts   5 x 5\" ea              - alt LE lifts   5 x 5\" ea              - alt UE/LE lifts 20 x 5\" Ea 1#    1#UE/1#LE  20x5\" ea   5\" x 10 ea 10 x 5\" ea  15 x 5\" ea  5''20x   Supine:                  - PPT                  - TA   20 x 5\"              - TA with alt march                  - TA with bent knee fall out                                     Standing (maintaining TA):                  - rows NV   10 x 5\" GTB 10x5\"  GTB 10x5\"   GTB  10 " "x 5\" RTB NV 10 x 5\" RTB  10 x 5\" RTB   10 x 5\" GTB   - pulldowns NV   10 x 5\" GTB 10x5\"  GTB 10x5\"   GTB  10 x 5\" RTB NV 10 x 5\" RTB  10 x 5\" RTB  10 x 5\" GTB   - thoracic antirotation NV   10 x 5\" ea GTB 10x5\"  GTB 10x5\" GTB  5 x 5\" ea RTB NV 5 x 5\" ea RTB  10 x 5\" ea RTB  10 x 5\" ea GTB                                                                                                                                        THERAPEUTIC ACTIVITY                  Front step ups 2 x 10 ea 6\"   2 x 10 ea 6\"  2x10 ea  6\"              Lateral step ups 2 x 10 ea 6\"     2x 10 ea 6\" 2x10 ea  6\"             Mini squats 2 x 10   2x10 2x10                                                                      GAIT TRAINING                                                                                                                 MODALITIES                  MH/cryo as needed      MHP to lumbar spine  Prone x10 mins                                             "

## 2025-02-27 ENCOUNTER — OFFICE VISIT (OUTPATIENT)
Dept: PHYSICAL THERAPY | Facility: CLINIC | Age: 30
End: 2025-02-27
Payer: COMMERCIAL

## 2025-02-27 DIAGNOSIS — M54.31 SCIATICA OF RIGHT SIDE: ICD-10-CM

## 2025-02-27 DIAGNOSIS — M54.41 ACUTE RIGHT-SIDED LOW BACK PAIN WITH RIGHT-SIDED SCIATICA: Primary | ICD-10-CM

## 2025-02-27 PROCEDURE — 97140 MANUAL THERAPY 1/> REGIONS: CPT | Performed by: PHYSICAL THERAPIST

## 2025-02-27 PROCEDURE — 97112 NEUROMUSCULAR REEDUCATION: CPT | Performed by: PHYSICAL THERAPIST

## 2025-02-27 NOTE — PROGRESS NOTES
Daily Note     Today's date: 2025  Patient name: Andrey Catalan  : 1995  MRN: [id number]  Referring provider: Tonia Jacobo CRNP  Dx:   Encounter Diagnosis     ICD-10-CM    1. Acute right-sided low back pain with right-sided sciatica  M54.41       2. Sciatica of right side  M54.31                      Subjective: Patient reports the swelling in his lower back remains; however, he is experiencing less pain overall.       Objective: See treatment diary below      Assessment: Pt arrived to session 20 minute late, therefore session was truncated. Pt continues to demonstrate favorable response to CPA mobilizations of lumbar spine. Tolerated treatment well. Patient demonstrated fatigue post treatment, exhibited good technique with therapeutic exercises, and would benefit from continued PT      Plan: Progress treatment as tolerated.       Precautions: Chronicity of symptoms, anxiety, manic behavior, bipolar    CO-MORBIDITIES:  HEP ACCESS CODE: Access Code: [email or web address]  (Updated: 24)  FOTO Completed On: 24     POC Expires Reeval for Medicare to be completed Unit LImit Auth Expiration Date PT/OT/STVisit Limit   3-14-25 By visit  19 unknown NA no     Completed on                            TREATMENT DIARY  Auth Status DATE 2-10  2/12 2/17 2/24 2-27  1-27  1-30 (RE)    2/3   NA Visit # 11  12 13 14 15  8  9  1     Remaining (until ideal 10 visit RE) 8  7 6 5 4  2  1  9   MANUAL THERAPY                 CPA mobilizations L1/L2- L5/S1 JG, Grade IV  JG, grade IV KG,  Grade IV KG  Gr IV JG grade IV  JG, Grade IV  JG, Grade IV  RR GR 4                                                                                              THERAPEUTIC EXERCISE                 NuStep with towel roll to improve LE strength/endurance  10 mins, L5  10 mins, L5 10 mins,   L5 10 mins  L5 10 mins  L5  10 mins, L5  10 mins, L5  10 mins, L5                     Standing:                 - lumbar extension at high mat   3 x 10  "    3 x 10  NV     - hip extension 2 x 10 ea GTB  1 x 10 ea 2x10 ea w/GTB       NV with Red TB  [id number]   - hip abduction 2 x 10 ea GTB  1 x 10 ea 2x10 ea w/GTB       NV with Red TB  2x10  green                     Prone:                 - props   60\" x 2     2 mins  DC     - laying on wedge with 2 pillows DC to HEP  5 mins     5 mins  5 mins  5 min    - press ups 2 x 10  2 x 10 2x10 2x10   2 x 10       - press ups with therapist OP Not needed        1 x 10  2 x 10  2x10                Table:             - SL hip ER 20 x 5\" ea GTB   20 x 5\" ea BTB  20x  5\" ea  BTB 20x 5\" ea  BTB 20x 5\" ea  BTB   20 x 5\" ea RTB NV 20x 5''green    - bridges with TB around knees 20 x 5\" ea GTB   20 x 5\" ea BTB  20x  5\" ea  BTB 20x 5\" ea  BTB 20x 5\" ea  BTB   20 x 5\" RTB NV 5''20x green                                  NEUROMUSCULAR REEDUCATION                  Prone:                 - alt UE lifts   5 x 5\" ea             - alt LE lifts   5 x 5\" ea             - alt UE/LE lifts 20 x 5\" Ea 1#    1#UE/1#LE  20x5\" ea  1#UE/1#LE  20x5\" ea  10 x 5\" ea  15 x 5\" ea  5''20x   Supine:                 - PPT                 - TA   20 x 5\"             - TA with alt march                 - TA with bent knee fall out                                   Standing (maintaining TA):                 - rows NV   10 x 5\" GTB 10x5\"  GTB 10x5\"   GTB 20 x 5\" GTB  10 x 5\" RTB  10 x 5\" RTB   10 x 5\" GTB   - pulldowns NV   10 x 5\" GTB 10x5\"  GTB 10x5\"   GTB 20 x 5\" GTB  10 x 5\" RTB  10 x 5\" RTB  10 x 5\" GTB   - thoracic antirotation NV   10 x 5\" ea GTB 10x5\"  GTB 10x5\" GTB 10x5\" GTB  5 x 5\" ea RTB  10 x 5\" ea RTB  10 x 5\" ea GTB                                                                                                                                 THERAPEUTIC ACTIVITY                 Front step ups 2 x 10 ea 6\"   2 x 10 ea 6\"  2x10 ea  6\"             Lateral step ups 2 x 10 ea 6\"     2x 10 ea 6\" 2x10 ea  6\"            Mini squats 2 x 10   2x10 2x10         "                                                          GAIT TRAINING                                                                                                           MODALITIES                 MH/cryo as needed      MHP to lumbar spine  Prone x10 mins                                     * 23 minutes 1:1 time this date.

## 2025-03-03 ENCOUNTER — OFFICE VISIT (OUTPATIENT)
Dept: PHYSICAL THERAPY | Facility: CLINIC | Age: 30
End: 2025-03-03
Payer: COMMERCIAL

## 2025-03-03 DIAGNOSIS — M54.41 ACUTE RIGHT-SIDED LOW BACK PAIN WITH RIGHT-SIDED SCIATICA: Primary | ICD-10-CM

## 2025-03-03 DIAGNOSIS — M54.31 SCIATICA OF RIGHT SIDE: ICD-10-CM

## 2025-03-03 PROCEDURE — 97110 THERAPEUTIC EXERCISES: CPT | Performed by: PHYSICAL THERAPIST

## 2025-03-03 PROCEDURE — 97112 NEUROMUSCULAR REEDUCATION: CPT | Performed by: PHYSICAL THERAPIST

## 2025-03-03 PROCEDURE — 97140 MANUAL THERAPY 1/> REGIONS: CPT | Performed by: PHYSICAL THERAPIST

## 2025-03-03 NOTE — PROGRESS NOTES
Daily Note     Today's date: 3/3/2025  Patient name: Andrey Catalan  : 1995  MRN: [id number]  Referring provider: Tonia Jacobo CRNP  Dx:   Encounter Diagnosis     ICD-10-CM    1. Acute right-sided low back pain with right-sided sciatica  M54.41       2. Sciatica of right side  M54.31                      Subjective: Patient reports he is feeling better today. He has an appointment with pain management on Thursday.       Objective: See treatment diary below      Assessment: Tolerated treatment well. Patient did not express increase in pain or symptoms during session today. Patient demonstrated fatigue post treatment, exhibited good technique with therapeutic exercises, and would benefit from continued PT      Plan: Continue per plan of care.  Progress treatment as tolerated.       Precautions: Chronicity of symptoms, anxiety, manic behavior, bipolar    CO-MORBIDITIES:  HEP ACCESS CODE: Access Code: [email or web address]  (Updated: 24)  FOTO Completed On: 24     POC Expires Reeval for Medicare to be completed Unit LImit Auth Expiration Date PT/OT/STVisit Limit   3-14-25 By visit  19 unknown NA no     Completed on                            TREATMENT DIARY  Auth Status DATE 2-10  2/12 2/17 2/24 2-27 3-3    (RE)    2/3   NA Visit # 11  12 13 14 15 16   9  1     Remaining (until ideal 10 visit RE) 8  7 6 5 4 3   1  9   MANUAL THERAPY                CPA mobilizations L1/L2- L5/S1 JG, Grade IV  JG, grade IV KG,  Grade IV KG  Gr IV JG grade IV KG  Gr IV   JG, Grade IV  RR GR 4                                                                                         THERAPEUTIC EXERCISE                NuStep with towel roll to improve LE strength/endurance  10 mins, L5  10 mins, L5 10 mins,   L5 10 mins  L5 10 mins  L5 10 mins  L5   10 mins, L5  10 mins, L5                    Standing:                - lumbar extension at high mat   3 x 10       NV     - hip extension 2 x 10 ea GTB  1 x 10 ea 2x10 ea w/GTB   "    NV with Red TB  [id number]   - hip abduction 2 x 10 ea GTB  1 x 10 ea 2x10 ea w/GTB      NV with Red TB  2x10  green                    Prone:                - props   60\" x 2       DC     - laying on wedge with 2 pillows DC to HEP  5 mins       5 mins  5 min    - press ups 2 x 10  2 x 10 2x10 2x10  2x10        - press ups with therapist OP Not needed          2 x 10  2x10                Table:             - SL hip ER 20 x 5\" ea GTB   20 x 5\" ea BTB  20x  5\" ea  BTB 20x 5\" ea  BTB 20x 5\" ea  BTB 20x 5\" ea  BTB  20 x 5\" ea RTB NV 20x 5''green    - bridges with TB around knees 20 x 5\" ea GTB   20 x 5\" ea BTB  20x  5\" ea  BTB 20x 5\" ea  BTB 20x 5\" ea  BTB 20x 5\" ea  BTB  20 x 5\" RTB NV 5''20x green                                 NEUROMUSCULAR REEDUCATION                 Prone:                - alt UE lifts   5 x 5\" ea            - alt LE lifts   5 x 5\" ea            - alt UE/LE lifts 20 x 5\" Ea 1#    1#UE/1#LE  20x5\" ea  1#UE/1#LE  20x5\" ea 1#UE/1#LE  20x5\" ea   15 x 5\" ea  5''20x   Supine:                - PPT                - TA   20 x 5\"            - TA with alt march                - TA with bent knee fall out                                 Standing (maintaining TA):                - rows NV   10 x 5\" GTB 10x5\"  GTB 10x5\"   GTB 20 x 5\" GTB 20 x 5\" GTB   10 x 5\" RTB   10 x 5\" GTB   - pulldowns NV   10 x 5\" GTB 10x5\"  GTB 10x5\"   GTB 20 x 5\" GTB 20 x 5\" GTB   10 x 5\" RTB  10 x 5\" GTB   - thoracic antirotation NV   10 x 5\" ea GTB 10x5\"  GTB 10x5\" GTB 10x5\" GTB 10 x 5\" GTB   10 x 5\" ea RTB  10 x 5\" ea GTB                                                                                                                          THERAPEUTIC ACTIVITY                Front step ups 2 x 10 ea 6\"   2 x 10 ea 6\"  2x10 ea  6\"            Lateral step ups 2 x 10 ea 6\"     2x 10 ea 6\" 2x10 ea  6\"           Mini squats 2 x 10   2x10 2x10                                                              GAIT TRAINING                    "                                                                                  MODALITIES                MH/cryo as needed      MHP to lumbar spine  Prone x10 mins

## 2025-03-06 ENCOUNTER — APPOINTMENT (OUTPATIENT)
Dept: PHYSICAL THERAPY | Facility: CLINIC | Age: 30
End: 2025-03-06
Payer: COMMERCIAL

## 2025-03-06 ENCOUNTER — CONSULT (OUTPATIENT)
Dept: PAIN MEDICINE | Facility: CLINIC | Age: 30
End: 2025-03-06
Payer: COMMERCIAL

## 2025-03-06 VITALS — WEIGHT: 135 LBS | BODY MASS INDEX: 21.69 KG/M2 | HEIGHT: 66 IN

## 2025-03-06 DIAGNOSIS — M54.42 CHRONIC BILATERAL LOW BACK PAIN WITH BILATERAL SCIATICA: ICD-10-CM

## 2025-03-06 DIAGNOSIS — M54.41 CHRONIC BILATERAL LOW BACK PAIN WITH BILATERAL SCIATICA: ICD-10-CM

## 2025-03-06 DIAGNOSIS — M53.3 SACROILIAC JOINT PAIN: Primary | ICD-10-CM

## 2025-03-06 DIAGNOSIS — G89.29 CHRONIC BILATERAL LOW BACK PAIN WITH BILATERAL SCIATICA: ICD-10-CM

## 2025-03-06 DIAGNOSIS — G89.4 CHRONIC PAIN SYNDROME: ICD-10-CM

## 2025-03-06 PROCEDURE — 99204 OFFICE O/P NEW MOD 45 MIN: CPT | Performed by: STUDENT IN AN ORGANIZED HEALTH CARE EDUCATION/TRAINING PROGRAM

## 2025-03-06 NOTE — PROGRESS NOTES
"Assessment:  1. Sacroiliac joint pain    2. Chronic bilateral low back pain with bilateral sciatica    3. Chronic pain syndrome        Portions of the record may have been created with voice recognition software. Occasional wrong word or \"sound a like\" substitutions may have occurred due to the inherent limitations of voice recognition software. Read the chart carefully and recognize, using context, where substitutions have occurred. Contact me with any questions.      Plan:  29-year-old male with past history of IV drug use, heroin abuse, on chronic Subutex maintenance, referred by PCP, here for initial evaluation of acute on chronic bilateral low back pain with occasional radiation into posterior BLE.  He notes pain limited difficulty with function and ambulation.  Denies new or progressive weakness, saddle anesthesia, BBI.  He has undergone extensive physical therapy without benefit.  He has been taking Celebrex 200 mg twice daily, Flexeril 10 mg as needed with limited relief.  He recently underwent a lumbar spine MRI which did not show significant findings to explain ongoing pain symptoms.  Concordant symptoms elicited with sacroiliac joint provocation on physical exam today.      Obtain MRI of sacrum/coccyx/SI joint with and without contrast for further evaluation.    Continue current medication regimen for symptom management.    Continue home exercise program.    Follow-up after completing imaging for results review and discussion of next steps.      History of Present Illness:    The patient is a 29 y.o. male who presents for consultation in regards to Back Pain (lumbar).  Symptoms have been present for 4-5 years. Symptoms began following a non work related injury. Pain is reported to be 5 on the numeric rating scale.  Symptoms are felt constantly and worst in the no typical pattern.  Symptoms are characterized as cramping, shooting, sharp, dull/aching, numbing, and throbbing.  Symptoms are associated with no " weakness.  Aggravating factors include bending, sitting, walking, and exercise.  Relieving factors include relaxation.       Review of Systems:    Review of Systems   Constitutional:  Negative for chills and fever.   HENT:  Negative for ear pain, sinus pressure and sore throat.    Eyes:  Negative for pain and redness.   Respiratory:  Negative for cough and wheezing.    Cardiovascular:  Negative for leg swelling.   Gastrointestinal:  Negative for abdominal pain and nausea.   Endocrine: Negative for polydipsia and polyuria.   Genitourinary:  Negative for difficulty urinating and frequency.   Musculoskeletal:  Positive for back pain. Negative for gait problem.   Skin:  Negative for pallor and wound.   Neurological:  Negative for seizures, weakness and headaches.   Psychiatric/Behavioral:  Negative for decreased concentration and sleep disturbance.            Past Medical History:   Diagnosis Date    Bipolar 2 disorder (Formerly [facility])     MAURICE (generalized anxiety disorder) 10/10/2018    Heroin abuse (Formerly [facility]) 10/10/2018    Manic behavior (Formerly [facility])        Past Surgical History:   Procedure Laterality Date    MANDIBLE FRACTURE SURGERY      WISDOM TOOTH EXTRACTION         Family History   Problem Relation Age of Onset    Seizures Mother     Multiple sclerosis Mother     Hepatitis Father        Social History     Occupational History    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   Tobacco Use    Smoking status: Former     Current packs/day: 0.50     Average packs/day: 0.5 packs/day for 6.0 years (3.0 ttl pk-yrs)     Types: Cigarettes    Smokeless tobacco: Former     Types: Chew    Tobacco comments:     Vapes- current user 4/18/22   Vaping Use    Vaping status: Every Day    Substances: Nicotine   Substance and Sexual Activity    Alcohol use: No    Drug use: Not Currently     Types: Amphetamines, Cocaine, Hydrocodone, Heroin, Marijuana, Oxycodone     Comment: Hasn't used since 9/2/2018    Sexual activity: Yes     Partners: Female         Current  "Outpatient Medications:     buprenorphine (SUBUTEX) 8 mg, dissolve 1 tablet under the tongue three times a day DO NOT chew or swallow, Disp: , Rfl:     celecoxib (CeleBREX) 200 mg capsule, Take 1 capsule (200 mg total) by mouth 2 (two) times a day, Disp: 60 capsule, Rfl: 1    cloNIDine (CATAPRES) 0.1 mg tablet, Take 1 tablet (0.1 mg total) by mouth 3 (three) times a day (Patient not taking: Reported on 12/17/2024), Disp: 90 tablet, Rfl: 5    cyclobenzaprine (FLEXERIL) 10 mg tablet, Take 1 tablet (10 mg total) by mouth 2 (two) times a day as needed for muscle spasms (Patient not taking: Reported on 12/17/2024), Disp: 20 tablet, Rfl: 0    methylPREDNISolone 4 MG tablet therapy pack, Use as directed on package (Patient not taking: Reported on 12/17/2024), Disp: 1 each, Rfl: 0    Sofosbuvir-Velpatasvir (Epclusa) 400-100 MG tablet, Take 1 tablet by mouth daily (Patient not taking: Reported on 12/30/2024), Disp: 28 tablet, Rfl: 2    Allergies   Allergen Reactions    Topamax [Topiramate] Angioedema    Naproxen GI Intolerance       Physical Exam:    Ht 5' 6\" (1.676 m)   Wt 61.2 kg (135 lb)   BMI 21.79 kg/m²     Constitutional: normal, well developed, well nourished, alert, in no distress and non-toxic and no overt pain behavior.  Eyes: anicteric  HEENT: grossly intact  Neck: supple, symmetric, trachea midline and no masses   Pulmonary:even and unlabored  Cardiovascular:No edema or pitting edema present  Skin:Normal without rashes or lesions and well hydrated  Psychiatric:Mood and affect appropriate  Neurologic:Cranial Nerves II-XII grossly intact  Musculoskeletal:normal gait, grossly intact strength and sensation to light touch over BLE, + b/l sacral compression, + b/l ALIDA, + b/l thigh thrust        Imaging  MRI pelvis sacrum,coccyx, si jts wo and w contrast    (Results Pending)     MRI LUMBAR SPINE WITHOUT CONTRAST     INDICATION: M54.31: Sciatica, right side  M54.41: Lumbago with sciatica, right side.     COMPARISON: " X-rays from December 2, 2024     TECHNIQUE:  Multiplanar, multisequence imaging of the lumbar spine was performed. .        IMAGE QUALITY:  Diagnostic     FINDINGS:     VERTEBRAL BODIES:  There are 5 lumbar type vertebral bodies.  Normal alignment of the lumbar spine.  No spondylolysis or spondylolisthesis. No scoliosis.  No compression fracture.    Normal marrow signal is identified within the visualized bony   structures.  No discrete marrow lesion.     SACRUM:  Normal signal within the sacrum. No evidence of insufficiency or stress fracture.     DISTAL CORD AND CONUS:  Normal size and signal within the distal cord and conus.     PARASPINAL SOFT TISSUES:  Paraspinal soft tissues are unremarkable.     LOWER THORACIC DISC SPACES:  Normal disc height and signal.  No disc herniation, canal stenosis or foraminal narrowing.     LUMBAR DISC SPACES:     L1-L2:  Normal.     L2-L3:  Normal.     L3-L4:  Normal.     L4-L5: Normal.     L5-S1: Mild bilateral facet hypertrophy, otherwise normal.     OTHER FINDINGS:  None.     IMPRESSION:     Essentially normal MRI of the lumbar spine. No significant canal or foraminal stenosis at any level    XR SPINE LUMBAR 2 OR 3 VIEWS INJURY     INDICATION: Pain with decreased range of motion.     COMPARISON: 12/5/2018     FINDINGS:     No acute fracture. Intact pedicles. Lumbar vertebral body heights are preserved.     Five non-rib-bearing lumbar vertebral bodies.     Normal alignment.     No significant degenerative changes. Intervertebral disc space heights are unremarkable for age.     Unremarkable soft tissues.     Multiple pelvic calcifications likely representing phleboliths are seen.     IMPRESSION:     No acute osseous abnormality.  Orders Placed This Encounter   Procedures    MRI pelvis sacrum,coccyx, si jts wo and w contrast

## 2025-03-17 ENCOUNTER — HOSPITAL ENCOUNTER (OUTPATIENT)
Dept: MRI IMAGING | Facility: HOSPITAL | Age: 30
Discharge: HOME | End: 2025-03-17
Attending: STUDENT IN AN ORGANIZED HEALTH CARE EDUCATION/TRAINING PROGRAM
Payer: COMMERCIAL

## 2025-03-17 DIAGNOSIS — G89.29 CHRONIC BILATERAL LOW BACK PAIN WITH BILATERAL SCIATICA: ICD-10-CM

## 2025-03-17 DIAGNOSIS — M54.41 CHRONIC BILATERAL LOW BACK PAIN WITH BILATERAL SCIATICA: ICD-10-CM

## 2025-03-17 DIAGNOSIS — M54.42 CHRONIC BILATERAL LOW BACK PAIN WITH BILATERAL SCIATICA: ICD-10-CM

## 2025-03-17 DIAGNOSIS — M53.3 SACROILIAC JOINT PAIN: ICD-10-CM

## 2025-03-17 PROCEDURE — 72197 MRI PELVIS W/O & W/DYE: CPT

## 2025-03-17 PROCEDURE — A9585 GADOBUTROL INJECTION: HCPCS | Performed by: STUDENT IN AN ORGANIZED HEALTH CARE EDUCATION/TRAINING PROGRAM

## 2025-03-17 RX ORDER — GADOBUTROL 604.72 MG/ML
6 INJECTION INTRAVENOUS
Status: COMPLETED | OUTPATIENT
Start: 2025-03-17 | End: 2025-03-17

## 2025-03-17 RX ADMIN — GADOBUTROL 6 ML: 604.72 INJECTION INTRAVENOUS at 20:29

## 2025-03-24 ENCOUNTER — APPOINTMENT (OUTPATIENT)
Dept: LAB | Facility: CLINIC | Age: 30
End: 2025-03-24
Payer: COMMERCIAL

## 2025-03-24 DIAGNOSIS — R53.83 OTHER FATIGUE: ICD-10-CM

## 2025-03-24 DIAGNOSIS — M25.50 ARTHRALGIA, UNSPECIFIED JOINT: ICD-10-CM

## 2025-03-24 LAB
ALBUMIN SERPL BCG-MCNC: 4.7 G/DL (ref 3.5–5)
ALP SERPL-CCNC: 60 U/L (ref 34–104)
ALT SERPL W P-5'-P-CCNC: 12 U/L (ref 7–52)
ANION GAP SERPL CALCULATED.3IONS-SCNC: 6 MMOL/L (ref 4–13)
AST SERPL W P-5'-P-CCNC: 18 U/L (ref 13–39)
BASOPHILS # BLD AUTO: 0.06 THOUSANDS/ÂΜL (ref 0–0.1)
BASOPHILS NFR BLD AUTO: 2 % (ref 0–1)
BILIRUB SERPL-MCNC: 0.7 MG/DL (ref 0.2–1)
BILIRUB UR QL STRIP: NEGATIVE
BUN SERPL-MCNC: 9 MG/DL (ref 5–25)
CALCIUM SERPL-MCNC: 9.5 MG/DL (ref 8.4–10.2)
CHLORIDE SERPL-SCNC: 102 MMOL/L (ref 96–108)
CLARITY UR: CLEAR
CO2 SERPL-SCNC: 31 MMOL/L (ref 21–32)
COLOR UR: NORMAL
CREAT SERPL-MCNC: 0.8 MG/DL (ref 0.6–1.3)
CRP SERPL QL: <1 MG/L
EOSINOPHIL # BLD AUTO: 0.12 THOUSAND/ÂΜL (ref 0–0.61)
EOSINOPHIL NFR BLD AUTO: 4 % (ref 0–6)
ERYTHROCYTE [DISTWIDTH] IN BLOOD BY AUTOMATED COUNT: 12.3 % (ref 11.6–15.1)
GFR SERPL CREATININE-BSD FRML MDRD: 119 ML/MIN/1.73SQ M
GLUCOSE P FAST SERPL-MCNC: 100 MG/DL (ref 65–99)
GLUCOSE UR STRIP-MCNC: NEGATIVE MG/DL
HCT VFR BLD AUTO: 40.9 % (ref 36.5–49.3)
HGB BLD-MCNC: 13.9 G/DL (ref 12–17)
HGB UR QL STRIP.AUTO: NEGATIVE
IMM GRANULOCYTES # BLD AUTO: 0.01 THOUSAND/UL (ref 0–0.2)
IMM GRANULOCYTES NFR BLD AUTO: 0 % (ref 0–2)
KETONES UR STRIP-MCNC: NEGATIVE MG/DL
LEUKOCYTE ESTERASE UR QL STRIP: NEGATIVE
LYMPHOCYTES # BLD AUTO: 1.09 THOUSANDS/ÂΜL (ref 0.6–4.47)
LYMPHOCYTES NFR BLD AUTO: 34 % (ref 14–44)
MCH RBC QN AUTO: 28.8 PG (ref 26.8–34.3)
MCHC RBC AUTO-ENTMCNC: 34 G/DL (ref 31.4–37.4)
MCV RBC AUTO: 85 FL (ref 82–98)
MONOCYTES # BLD AUTO: 0.23 THOUSAND/ÂΜL (ref 0.17–1.22)
MONOCYTES NFR BLD AUTO: 7 % (ref 4–12)
NEUTROPHILS # BLD AUTO: 1.73 THOUSANDS/ÂΜL (ref 1.85–7.62)
NEUTS SEG NFR BLD AUTO: 53 % (ref 43–75)
NITRITE UR QL STRIP: NEGATIVE
NRBC BLD AUTO-RTO: 0 /100 WBCS
PH UR STRIP.AUTO: 6.5 [PH]
PLATELET # BLD AUTO: 223 THOUSANDS/UL (ref 149–390)
PMV BLD AUTO: 10.7 FL (ref 8.9–12.7)
POTASSIUM SERPL-SCNC: 3.7 MMOL/L (ref 3.5–5.3)
PROT SERPL-MCNC: 7.4 G/DL (ref 6.4–8.4)
PROT UR STRIP-MCNC: NEGATIVE MG/DL
RBC # BLD AUTO: 4.83 MILLION/UL (ref 3.88–5.62)
RHEUMATOID FACT SERPL-ACNC: <10 IU/ML
SODIUM SERPL-SCNC: 139 MMOL/L (ref 135–147)
SP GR UR STRIP.AUTO: 1.01 (ref 1–1.03)
TSH SERPL DL<=0.05 MIU/L-ACNC: 0.89 UIU/ML (ref 0.45–4.5)
UROBILINOGEN UR STRIP-ACNC: <2 MG/DL
WBC # BLD AUTO: 3.24 THOUSAND/UL (ref 4.31–10.16)

## 2025-03-24 PROCEDURE — 86431 RHEUMATOID FACTOR QUANT: CPT | Performed by: NURSE PRACTITIONER

## 2025-03-24 PROCEDURE — 86140 C-REACTIVE PROTEIN: CPT

## 2025-03-24 PROCEDURE — 86431 RHEUMATOID FACTOR QUANT: CPT

## 2025-03-24 PROCEDURE — 36415 COLL VENOUS BLD VENIPUNCTURE: CPT

## 2025-03-24 PROCEDURE — 86225 DNA ANTIBODY NATIVE: CPT

## 2025-03-24 PROCEDURE — 84443 ASSAY THYROID STIM HORMONE: CPT

## 2025-03-24 PROCEDURE — 81003 URINALYSIS AUTO W/O SCOPE: CPT

## 2025-03-24 PROCEDURE — 85025 COMPLETE CBC W/AUTO DIFF WBC: CPT

## 2025-03-24 PROCEDURE — 86038 ANTINUCLEAR ANTIBODIES: CPT

## 2025-03-24 PROCEDURE — 80053 COMPREHEN METABOLIC PANEL: CPT

## 2025-03-25 ENCOUNTER — OFFICE VISIT (OUTPATIENT)
Dept: PAIN MEDICINE | Facility: CLINIC | Age: 30
End: 2025-03-25
Payer: COMMERCIAL

## 2025-03-25 ENCOUNTER — LAB REQUISITION (OUTPATIENT)
Dept: LAB | Facility: HOSPITAL | Age: 30
End: 2025-03-25
Payer: COMMERCIAL

## 2025-03-25 VITALS — HEIGHT: 66 IN | BODY MASS INDEX: 21.79 KG/M2

## 2025-03-25 DIAGNOSIS — M54.50 CHRONIC BILATERAL LOW BACK PAIN WITHOUT SCIATICA: ICD-10-CM

## 2025-03-25 DIAGNOSIS — M79.18 MYOFASCIAL PAIN SYNDROME: ICD-10-CM

## 2025-03-25 DIAGNOSIS — M25.50 PAIN IN UNSPECIFIED JOINT: ICD-10-CM

## 2025-03-25 DIAGNOSIS — G89.29 CHRONIC BILATERAL LOW BACK PAIN WITHOUT SCIATICA: ICD-10-CM

## 2025-03-25 DIAGNOSIS — G89.4 CHRONIC PAIN SYNDROME: Primary | ICD-10-CM

## 2025-03-25 LAB — RHEUMATOID FACT SERPL-ACNC: <10 IU/ML

## 2025-03-25 PROCEDURE — 99214 OFFICE O/P EST MOD 30 MIN: CPT | Performed by: STUDENT IN AN ORGANIZED HEALTH CARE EDUCATION/TRAINING PROGRAM

## 2025-03-25 NOTE — PROGRESS NOTES
"Assessment:  1. Chronic pain syndrome    2. Myofascial pain syndrome    3. Chronic bilateral low back pain without sciatica        Portions of the record may have been created with voice recognition software. Occasional wrong word or \"sound a like\" substitutions may have occurred due to the inherent limitations of voice recognition software. Read the chart carefully and recognize, using context, where substitutions have occurred. Contact me with any questions.       Plan:  29-year-old male here for a follow-up visit with regards to chronic right greater than left low back pain symptoms.  Since last visit, he underwent a MRI of pelvis/sacrum/coccyx/SI joint which did not show significant findings.  He is also previously undergone a lumbar spine MRI which did not show significant degenerative changes.  He notes axial low back pain symptoms are his most significant concern, denies significant radicular pain, paresthesias, new or progressive weakness, saddle anesthesia, BBI.  Chronic low back pain symptoms likely myofascial in nature.      Discussed option of trigger point injections for symptom management.    Continue home exercise program.    Follow-up in 2 weeks after injection.          Complete risks and benefits including bleeding, infection, tissue reaction, nerve injury and allergic reaction were discussed. The approach was demonstrated using models and literature was provided. Verbal and written consent was obtained.    My impressions and treatment recommendations were discussed in detail with the patient who verbalized understanding and had no further questions.  Discharge instructions were provided. I personally saw and examined the patient and I agree with the above discussed plan of care.      History of Present Illness:  Andrey Catalan is a 29 y.o. male who presents for a follow up office visit in regards to Back Pain.      I have personally reviewed and/or updated the patient's past medical history, " past surgical history, family history, social history, current medications, allergies, and vital signs today.     Review of Systems   Constitutional:  Negative for fever.   HENT:  Negative for sinus pain.    Eyes:  Negative for redness.   Respiratory:  Negative for shortness of breath.    Cardiovascular:  Negative for palpitations.   Gastrointestinal:  Negative for abdominal pain and nausea.   Endocrine: Negative for polydipsia.   Genitourinary:  Negative for difficulty urinating.   Musculoskeletal:  Positive for back pain and gait problem. Negative for myalgias.        Decreased ROM  Swelling-Lower R side spine    Skin:  Negative for rash.   Neurological:  Negative for seizures and headaches.   Psychiatric/Behavioral:  Negative for decreased concentration. The patient is not nervous/anxious.        Patient Active Problem List   Diagnosis    Drug dependence (HCC)    Encounter for monitoring Suboxone maintenance therapy    Drug therapy continued    Heroin abuse (HCC)    IV drug abuse (HCC)    Migraine without aura and without status migrainosus, not intractable    MAURICE (generalized anxiety disorder)    Former smoker    Electronic cigarette use    Primary insomnia    Hepatitis C antibody positive in blood    Nausea       Past Medical History:   Diagnosis Date    Bipolar 2 disorder (HCC)     MAURICE (generalized anxiety disorder) 10/10/2018    Heroin abuse (HCC) 10/10/2018    Manic behavior (formerly [facility])        Past Surgical History:   Procedure Laterality Date    MANDIBLE FRACTURE SURGERY      WISDOM TOOTH EXTRACTION         Family History   Problem Relation Age of Onset    Seizures Mother     Multiple sclerosis Mother     Hepatitis Father        Social History     Occupational History    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   Tobacco Use    Smoking status: Former     Current packs/day: 0.50     Average packs/day: 0.5 packs/day for 6.0 years (3.0 ttl pk-yrs)     Types: Cigarettes    Smokeless tobacco: Former     Types: Chew    Tobacco  "comments:     Vapes- current user 4/18/22   Vaping Use    Vaping status: Every Day    Substances: Nicotine   Substance and Sexual Activity    Alcohol use: No    Drug use: Not Currently     Types: Amphetamines, Cocaine, Hydrocodone, Heroin, Marijuana, Oxycodone     Comment: Hasn't used since 9/2/2018    Sexual activity: Yes     Partners: Female       Current Outpatient Medications on File Prior to Visit   Medication Sig    buprenorphine (SUBUTEX) 8 mg dissolve 1 tablet under the tongue three times a day DO NOT chew or swallow    celecoxib (CeleBREX) 200 mg capsule Take 1 capsule (200 mg total) by mouth 2 (two) times a day    cloNIDine (CATAPRES) 0.1 mg tablet Take 1 tablet (0.1 mg total) by mouth 3 (three) times a day (Patient not taking: Reported on 12/17/2024)    cyclobenzaprine (FLEXERIL) 10 mg tablet Take 1 tablet (10 mg total) by mouth 2 (two) times a day as needed for muscle spasms (Patient not taking: Reported on 12/17/2024)    methylPREDNISolone 4 MG tablet therapy pack Use as directed on package (Patient not taking: Reported on 12/17/2024)    Sofosbuvir-Velpatasvir (Epclusa) 400-100 MG tablet Take 1 tablet by mouth daily (Patient not taking: Reported on 3/25/2025)     No current facility-administered medications on file prior to visit.       Allergies   Allergen Reactions    Topamax [Topiramate] Angioedema    Naproxen GI Intolerance       Physical Exam:    Ht 5' 6\" (1.676 m)   BMI 21.79 kg/m²     Constitutional:normal, well developed, well nourished, alert, in no distress and non-toxic and no overt pain behavior.  Eyes:anicteric  HEENT:grossly intact  Neck:supple, symmetric, trachea midline and no masses   Pulmonary:even and unlabored  Cardiovascular:No edema or pitting edema present  Skin:Normal without rashes or lesions and well hydrated  Psychiatric:Mood and affect appropriate  Neurologic:Cranial Nerves II-XII grossly intact  Musculoskeletal:normal gait      Imaging       Study Result    Narrative & " Impression   MRI PELVIS SACRUM,COCCYX, SI JTS WO AND W CONTRAST     INDICATION:   M53.3: Sacrococcygeal disorders, not elsewhere classified  M54.42: Lumbago with sciatica, left side  M54.41: Lumbago with sciatica, right side  G89.29: Other chronic pain.     COMPARISON: Correlation is made with the prior CT study dated 1/6/2018.     TECHNIQUE:  Multiplanar/multisequence MR of the pelvis to evaluate for sacrum and coccyx pathology was performed both pre and post IV contrast.  Imaging performed on 1.5T MRI     IV Contrast:  6 mL of Gadobutrol injection     FINDINGS:     SUBCUTANEOUS TISSUES: Normal     SI JOINTS AND SYMPHYSIS PUBIS:   Intact.. There is no fluid within the sacroiliac joints. No subchondral marrow edema or abnormal enhancement to suggest sacroiliitis.     VISUALIZED LUMBAR SPINE:  Unremarkable.     BONES:  No fracture or dislocation.  No suspicious marrow abnormality.     MUSCULATURE:  Unremarkable.     PELVIC SOFT TISSUES:   Normal.     IMPRESSION:     No acute MR findings. No MR evidence for sacroiliitis.

## 2025-03-27 LAB
DSDNA IGG SERPL IA-ACNC: <0.9 IU/ML (ref ?–15)
NUCLEAR IGG SER IA-RTO: 0.3 RATIO (ref ?–1)

## 2025-04-01 ENCOUNTER — RESULTS FOLLOW-UP (OUTPATIENT)
Dept: FAMILY MEDICINE CLINIC | Facility: CLINIC | Age: 30
End: 2025-04-01

## 2025-04-02 ENCOUNTER — PROCEDURE VISIT (OUTPATIENT)
Dept: PAIN MEDICINE | Facility: CLINIC | Age: 30
End: 2025-04-02
Payer: COMMERCIAL

## 2025-04-02 VITALS — HEART RATE: 64 BPM | DIASTOLIC BLOOD PRESSURE: 82 MMHG | TEMPERATURE: 97 F | SYSTOLIC BLOOD PRESSURE: 137 MMHG

## 2025-04-02 DIAGNOSIS — M79.18 MYOFASCIAL PAIN SYNDROME: Primary | ICD-10-CM

## 2025-04-02 PROCEDURE — 76942 ECHO GUIDE FOR BIOPSY: CPT | Performed by: STUDENT IN AN ORGANIZED HEALTH CARE EDUCATION/TRAINING PROGRAM

## 2025-04-02 PROCEDURE — 20552 NJX 1/MLT TRIGGER POINT 1/2: CPT | Performed by: STUDENT IN AN ORGANIZED HEALTH CARE EDUCATION/TRAINING PROGRAM

## 2025-04-02 RX ORDER — METHYLPREDNISOLONE ACETATE 40 MG/ML
20 INJECTION, SUSPENSION INTRA-ARTICULAR; INTRALESIONAL; INTRAMUSCULAR; SOFT TISSUE
Status: COMPLETED | OUTPATIENT
Start: 2025-04-02 | End: 2025-04-02

## 2025-04-02 RX ORDER — BUPIVACAINE HYDROCHLORIDE 2.5 MG/ML
4 INJECTION, SOLUTION EPIDURAL; INFILTRATION; INTRACAUDAL; PERINEURAL
Status: COMPLETED | OUTPATIENT
Start: 2025-04-02 | End: 2025-04-02

## 2025-04-02 RX ADMIN — METHYLPREDNISOLONE ACETATE 20 MG: 40 INJECTION, SUSPENSION INTRA-ARTICULAR; INTRALESIONAL; INTRAMUSCULAR; SOFT TISSUE at 11:30

## 2025-04-02 RX ADMIN — BUPIVACAINE HYDROCHLORIDE 4 ML: 2.5 INJECTION, SOLUTION EPIDURAL; INFILTRATION; INTRACAUDAL; PERINEURAL at 11:30

## 2025-04-02 NOTE — PATIENT INSTRUCTIONS
Do not apply heat to any area that is numb. If you have discomfort or soreness at the injection site, you may apply ice today, 20 minutes on and 20 minutes off. Tomorrow you may use ice or warm, moist heat. Do not apply ice or heat directly to the skin.  If you experience severe shortness of breath, go to the Emergency Room.  You may have numbness for several hours from the local anesthetic. Please use caution and common sense, especially with weight-bearing activities.  You may have an increase or change in the discomfort for 36-48 hours after your treatment. Apply ice and continue with any pain medicine you have been prescribed.  Do not do anything strenuous today. You may shower, but no tub baths or hot tubs today. You may resume your normal activities tomorrow, but do not “overdo it”. Resume normal activities slowly when you are feeling better.  If you experience redness, drainage or swelling at the injection site, or if you develop a fever above 100 degrees, please call [facility] at [phone] or go to the Emergency Room.  Continue to take all routine medicines prescribed by your primary care physician unless otherwise instructed by our staff. Most blood thinners should be started again according to your regularly scheduled dosing. If you have any questions, please give our office a call.    As no general anesthesia was used in today's procedure, you should not experience any side effects related to anesthesia.       If you have a problem specifically related to your procedure, please call our office at [phone].  Problems not related to your procedure should be directed to your primary care physician.

## 2025-04-02 NOTE — PROGRESS NOTES
" Universal Protocol:  Consent: Written consent obtained.  Risks and benefits: risks, benefits and alternatives were discussed  Consent given by: patient  Time out: Immediately prior to procedure a \"time out\" was called to verify the correct patient, procedure, equipment, support staff and site/side marked as required.  Patient understanding: patient states understanding of the procedure being performed  Patient consent: the patient's understanding of the procedure matches consent given  Procedure consent: procedure consent matches procedure scheduled  Site marked: the operative site was marked  Patient identity confirmed: verbally with patient  Supporting Documentation  Indications: pain   Trigger Point Injections: single/multiple trigger point(s): 1-2 muscle groups    Injection site identified by: ultrasound  Procedure Details  Location(s):    Lower Back: R lumbar paraspinals and R iliocostalis lumborum     Prep: patient was prepped and draped in usual sterile fashion  Needle size: 25 G  Medications: 4 mL bupivacaine (PF) 0.25 %; 20 mg methylPREDNISolone acetate 40 mg/mL  Patient tolerance: patient tolerated the procedure well with no immediate complications          "

## 2025-04-16 ENCOUNTER — TELEPHONE (OUTPATIENT)
Dept: PAIN MEDICINE | Facility: CLINIC | Age: 30
End: 2025-04-16

## 2025-04-17 ENCOUNTER — OFFICE VISIT (OUTPATIENT)
Dept: PAIN MEDICINE | Facility: CLINIC | Age: 30
End: 2025-04-17
Payer: COMMERCIAL

## 2025-04-17 VITALS — BODY MASS INDEX: 21.38 KG/M2 | HEIGHT: 66 IN | WEIGHT: 133 LBS

## 2025-04-17 DIAGNOSIS — M79.18 MYOFASCIAL PAIN SYNDROME: ICD-10-CM

## 2025-04-17 DIAGNOSIS — G89.4 CHRONIC PAIN SYNDROME: Primary | ICD-10-CM

## 2025-04-17 DIAGNOSIS — G89.29 CHRONIC BILATERAL LOW BACK PAIN WITHOUT SCIATICA: ICD-10-CM

## 2025-04-17 DIAGNOSIS — M54.50 CHRONIC BILATERAL LOW BACK PAIN WITHOUT SCIATICA: ICD-10-CM

## 2025-04-17 PROCEDURE — 99214 OFFICE O/P EST MOD 30 MIN: CPT | Performed by: STUDENT IN AN ORGANIZED HEALTH CARE EDUCATION/TRAINING PROGRAM

## 2025-04-17 NOTE — PROGRESS NOTES
"Assessment:  1. Chronic pain syndrome    2. Myofascial pain syndrome    3. Chronic bilateral low back pain without sciatica        Portions of the record may have been created with voice recognition software. Occasional wrong word or \"sound a like\" substitutions may have occurred due to the inherent limitations of voice recognition software. Read the chart carefully and recognize, using context, where substitutions have occurred. Contact me with any questions.       Plan:  29 y o m here for a follow up visit with regards to chronic low back pain symptoms. Since last visit, he underwent trigger point injections on 4/2/25 and notes 30-40% improvement with this. He is also pursuing more PT and HEP. Denies new issues or concerns today.      Discussed option of repeat USG TPIs to address residual pain symptoms. Patient elected to schedule these today.    Continue PT/HEP.    Follow up in 2 weeks after injection.        Complete risks and benefits including bleeding, infection, tissue reaction, nerve injury and allergic reaction were discussed. The approach was demonstrated using models and literature was provided. Verbal and written consent was obtained.    My impressions and treatment recommendations were discussed in detail with the patient who verbalized understanding and had no further questions.  Discharge instructions were provided. I personally saw and examined the patient and I agree with the above discussed plan of care.      History of Present Illness:  Andrey Catalan is a 29 y.o. male who presents for a follow up office visit in regards to Back Pain.     I have personally reviewed and/or updated the patient's past medical history, past surgical history, family history, social history, current medications, allergies, and vital signs today.     Review of Systems   Constitutional:  Negative for fever.   HENT:  Negative for sinus pain.    Eyes:  Negative for redness.   Respiratory:  Negative for shortness of " breath.    Cardiovascular:  Negative for palpitations.   Gastrointestinal:  Negative for abdominal pain and nausea.   Endocrine: Negative for polydipsia.   Genitourinary:  Negative for difficulty urinating.   Musculoskeletal:  Positive for back pain and myalgias. Negative for gait problem.        Decreased ROM   Skin:  Negative for rash.   Neurological:  Negative for seizures and headaches.   Psychiatric/Behavioral:  Negative for decreased concentration. The patient is not nervous/anxious.        Patient Active Problem List   Diagnosis    Drug dependence ([facility])    Encounter for monitoring Suboxone maintenance therapy    Drug therapy continued    Heroin abuse ([facility])    IV drug abuse ([facility])    Migraine without aura and without status migrainosus, not intractable    MAURICE (generalized anxiety disorder)    Former smoker    Electronic cigarette use    Primary insomnia    Hepatitis C antibody positive in blood    Nausea       Past Medical History:   Diagnosis Date    Bipolar 2 disorder ([facility])     MAURICE (generalized anxiety disorder) 10/10/2018    Heroin abuse ([facility]) 10/10/2018    Manic behavior ([facility])        Past Surgical History:   Procedure Laterality Date    MANDIBLE FRACTURE SURGERY      WISDOM TOOTH EXTRACTION         Family History   Problem Relation Age of Onset    Seizures Mother     Multiple sclerosis Mother     Hepatitis Father        Social History     Occupational History    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   Tobacco Use    Smoking status: Former     Current packs/day: 0.50     Average packs/day: 0.5 packs/day for 6.0 years (3.0 ttl pk-yrs)     Types: Cigarettes    Smokeless tobacco: Former     Types: Chew    Tobacco comments:     Vapes- current user 4/18/22   Vaping Use    Vaping status: Every Day    Substances: Nicotine   Substance and Sexual Activity    Alcohol use: No    Drug use: Not Currently     Types: Amphetamines, Cocaine, Hydrocodone, Heroin, Marijuana, Oxycodone     Comment: Hasn't used since 9/2/2018    Sexual  "activity: Yes     Partners: Female       Current Outpatient Medications on File Prior to Visit   Medication Sig    buprenorphine (SUBUTEX) 8 mg dissolve 1 tablet under the tongue three times a day DO NOT chew or swallow    celecoxib (CeleBREX) 200 mg capsule Take 1 capsule (200 mg total) by mouth 2 (two) times a day    cloNIDine (CATAPRES) 0.1 mg tablet Take 1 tablet (0.1 mg total) by mouth 3 (three) times a day (Patient not taking: Reported on 12/17/2024)    cyclobenzaprine (FLEXERIL) 10 mg tablet Take 1 tablet (10 mg total) by mouth 2 (two) times a day as needed for muscle spasms (Patient not taking: Reported on 12/17/2024)    methylPREDNISolone 4 MG tablet therapy pack Use as directed on package (Patient not taking: Reported on 12/17/2024)    Sofosbuvir-Velpatasvir (Epclusa) 400-100 MG tablet Take 1 tablet by mouth daily (Patient not taking: Reported on 12/30/2024)     No current facility-administered medications on file prior to visit.       Allergies   Allergen Reactions    Topamax [Topiramate] Angioedema    Naproxen GI Intolerance       Physical Exam:    Ht 5' 6\" (1.676 m)   Wt 60.3 kg (133 lb)   BMI 21.47 kg/m²     Constitutional:normal, well developed, well nourished, alert, in no distress and non-toxic and no overt pain behavior.  Eyes:anicteric  HEENT:grossly intact  Neck:supple, symmetric, trachea midline and no masses   Pulmonary:even and unlabored  Cardiovascular:No edema or pitting edema present  Skin:Normal without rashes or lesions and well hydrated  Psychiatric:Mood and affect appropriate  Neurologic:Cranial Nerves II-XII grossly intact  Musculoskeletal:normal gait      Imaging  No new relevant imaging available for review.  "

## 2025-04-17 NOTE — RESULT ENCOUNTER NOTE
Notify patient sorry for the delay.  Blood work was good.  No evidence of any rheumatological condition and white count was borderline as it has been before I would just recheck that in 3 months.  I can still refer him on if he is having difficulty he can talk to me and at recheck or let me know sooner if he needs something done now

## 2025-04-18 ENCOUNTER — TELEPHONE (OUTPATIENT)
Age: 30
End: 2025-04-18

## 2025-05-21 ENCOUNTER — PROCEDURE VISIT (OUTPATIENT)
Dept: PAIN MEDICINE | Facility: CLINIC | Age: 30
End: 2025-05-21
Payer: COMMERCIAL

## 2025-05-21 VITALS
HEIGHT: 66 IN | HEART RATE: 66 BPM | SYSTOLIC BLOOD PRESSURE: 120 MMHG | DIASTOLIC BLOOD PRESSURE: 75 MMHG | WEIGHT: 133 LBS | BODY MASS INDEX: 21.38 KG/M2

## 2025-05-21 DIAGNOSIS — M79.18 MYOFASCIAL PAIN SYNDROME: Primary | ICD-10-CM

## 2025-05-21 PROCEDURE — 20552 NJX 1/MLT TRIGGER POINT 1/2: CPT | Performed by: STUDENT IN AN ORGANIZED HEALTH CARE EDUCATION/TRAINING PROGRAM

## 2025-05-21 PROCEDURE — 76942 ECHO GUIDE FOR BIOPSY: CPT | Performed by: STUDENT IN AN ORGANIZED HEALTH CARE EDUCATION/TRAINING PROGRAM

## 2025-05-21 RX ORDER — BUPIVACAINE HYDROCHLORIDE 2.5 MG/ML
4 INJECTION, SOLUTION EPIDURAL; INFILTRATION; INTRACAUDAL; PERINEURAL
Status: COMPLETED | OUTPATIENT
Start: 2025-05-21 | End: 2025-05-21

## 2025-05-21 RX ORDER — METHYLPREDNISOLONE ACETATE 40 MG/ML
20 INJECTION, SUSPENSION INTRA-ARTICULAR; INTRALESIONAL; INTRAMUSCULAR; SOFT TISSUE
Status: COMPLETED | OUTPATIENT
Start: 2025-05-21 | End: 2025-05-21

## 2025-05-21 RX ADMIN — METHYLPREDNISOLONE ACETATE 20 MG: 40 INJECTION, SUSPENSION INTRA-ARTICULAR; INTRALESIONAL; INTRAMUSCULAR; SOFT TISSUE at 10:00

## 2025-05-21 RX ADMIN — BUPIVACAINE HYDROCHLORIDE 4 ML: 2.5 INJECTION, SOLUTION EPIDURAL; INFILTRATION; INTRACAUDAL; PERINEURAL at 10:00

## 2025-05-21 NOTE — PATIENT INSTRUCTIONS
Do not apply heat to any area that is numb. If you have discomfort or soreness at the injection site, you may apply ice today, 20 minutes on and 20 minutes off. Tomorrow you may use ice or warm, moist heat. Do not apply ice or heat directly to the skin.  If you experience severe shortness of breath, go to the Emergency Room.  You may have numbness for several hours from the local anesthetic. Please use caution and common sense, especially with weight-bearing activities.  You may have an increase or change in the discomfort for 36-48 hours after your treatment. Apply ice and continue with any pain medicine you have been prescribed.  Do not do anything strenuous today. You may shower, but no tub baths or hot tubs today. You may resume your normal activities tomorrow, but do not “overdo it”. Resume normal activities slowly when you are feeling better.  If you experience redness, drainage or swelling at the injection site, or if you develop a fever above 100 degrees, please call [facility] at [phone] or go to the Emergency Room.  Continue to take all routine medicines prescribed by your primary care physician unless otherwise instructed by our staff. Most blood thinners should be started again according to your regularly scheduled dosing. If you have any questions, please give our office a call.    As no general anesthesia was used in today's procedure, you should not experience any side effects related to anesthesia.       If you have a problem specifically related to your procedure, please call our office at [phone].  Problems not related to your procedure should be directed to your primary care physician.

## 2025-05-21 NOTE — PROGRESS NOTES
"   Universal Protocol:  Consent: Written consent obtained  Risks and benefits: risks, benefits and alternatives were discussed  Consent given by: patient  Time out: Immediately prior to procedure a \"time out\" was called to verify the correct patient, procedure, equipment, support staff and site/side marked as required.  Patient understanding: patient states understanding of the procedure being performed  Patient consent: the patient's understanding of the procedure matches consent given  Site marked: the operative site was marked  Patient identity confirmed: verbally with patient  Supporting Documentation  Indications: pain   Trigger Point Injections: single/multiple trigger point(s): 1-2 muscle groups    Injection site identified by: ultrasound  Procedure Details  Location(s):    Lower Back: R lumbar paraspinals and R iliocostalis lumborum     Prep: patient was prepped and draped in usual sterile fashion  Needle size: 25 G  Medications: 4 mL bupivacaine (PF) 0.25 %; 20 mg methylPREDNISolone acetate 40 mg/mL  Patient tolerance: patient tolerated the procedure well with no immediate complications          "

## 2025-06-04 ENCOUNTER — TELEPHONE (OUTPATIENT)
Dept: PAIN MEDICINE | Facility: CLINIC | Age: 30
End: 2025-06-04